# Patient Record
Sex: FEMALE | Race: WHITE | Employment: UNEMPLOYED | URBAN - METROPOLITAN AREA
[De-identification: names, ages, dates, MRNs, and addresses within clinical notes are randomized per-mention and may not be internally consistent; named-entity substitution may affect disease eponyms.]

---

## 2017-01-03 ENCOUNTER — TELEPHONE (OUTPATIENT)
Dept: PAIN MANAGEMENT | Age: 57
End: 2017-01-03

## 2017-01-04 DIAGNOSIS — M96.1 POSTLAMINECTOMY SYNDROME, LUMBAR REGION: Primary | ICD-10-CM

## 2017-01-04 DIAGNOSIS — M54.17 LUMBOSACRAL RADICULOPATHY: ICD-10-CM

## 2017-01-09 ENCOUNTER — TELEPHONE (OUTPATIENT)
Dept: PAIN MANAGEMENT | Age: 57
End: 2017-01-09

## 2017-01-09 NOTE — TELEPHONE ENCOUNTER
Pt called Friday asking for status on MRI of lumbar spine. Have LM with Henry Lockwood to check status as it will be scheduled with Nica Daniels. Have called pt to relay and advised that with the weather, it will be a few more days for call to schedule. She expressed understanding.

## 2017-01-24 ENCOUNTER — HOSPITAL ENCOUNTER (OUTPATIENT)
Dept: MRI IMAGING | Age: 57
Discharge: HOME | End: 2017-01-24
Attending: PSYCHIATRY & NEUROLOGY
Payer: MEDICAID

## 2017-01-24 DIAGNOSIS — M96.1 POSTLAMINECTOMY SYNDROME, LUMBAR REGION: ICD-10-CM

## 2017-01-24 DIAGNOSIS — M54.17 LUMBOSACRAL RADICULOPATHY: ICD-10-CM

## 2017-01-24 PROCEDURE — A9585 GADOBUTROL INJECTION: HCPCS | Performed by: PSYCHIATRY & NEUROLOGY

## 2017-01-24 PROCEDURE — 72158 MRI LUMBAR SPINE W/O & W/DYE: CPT

## 2017-01-24 PROCEDURE — 74011250636 HC RX REV CODE- 250/636: Performed by: PSYCHIATRY & NEUROLOGY

## 2017-01-24 RX ADMIN — GADOBUTROL 5 ML: 604.72 INJECTION INTRAVENOUS at 16:55

## 2017-01-25 ENCOUNTER — OFFICE VISIT (OUTPATIENT)
Dept: PAIN MANAGEMENT | Age: 57
End: 2017-01-25

## 2017-01-25 VITALS
HEART RATE: 83 BPM | DIASTOLIC BLOOD PRESSURE: 60 MMHG | BODY MASS INDEX: 23.78 KG/M2 | SYSTOLIC BLOOD PRESSURE: 114 MMHG | WEIGHT: 130 LBS

## 2017-01-25 DIAGNOSIS — G89.4 CHRONIC PAIN SYNDROME: ICD-10-CM

## 2017-01-25 DIAGNOSIS — M96.1 POSTLAMINECTOMY SYNDROME, LUMBAR REGION: ICD-10-CM

## 2017-01-25 DIAGNOSIS — M79.7 FIBROMYALGIA: ICD-10-CM

## 2017-01-25 DIAGNOSIS — M62.838 SPASM OF MUSCLE: ICD-10-CM

## 2017-01-25 DIAGNOSIS — M54.2 CERVICALGIA: ICD-10-CM

## 2017-01-25 DIAGNOSIS — Z79.899 ENCOUNTER FOR LONG-TERM (CURRENT) USE OF HIGH-RISK MEDICATION: ICD-10-CM

## 2017-01-25 DIAGNOSIS — M47.817 LUMBOSACRAL SPONDYLOSIS WITHOUT MYELOPATHY: ICD-10-CM

## 2017-01-25 DIAGNOSIS — M54.12 BRACHIAL NEURITIS OR RADICULITIS: ICD-10-CM

## 2017-01-25 DIAGNOSIS — I01.1 RHEUMATOID AORTITIS: Primary | ICD-10-CM

## 2017-01-25 DIAGNOSIS — M54.6 PAIN IN THORACIC SPINE: ICD-10-CM

## 2017-01-25 DIAGNOSIS — G47.00 PERSISTENT DISORDER OF INITIATING OR MAINTAINING SLEEP: ICD-10-CM

## 2017-01-25 DIAGNOSIS — M25.50 PAIN IN JOINT, MULTIPLE SITES: ICD-10-CM

## 2017-01-25 RX ORDER — OXYCODONE HYDROCHLORIDE 30 MG/1
30 TABLET ORAL
Qty: 150 TAB | Refills: 0 | Status: SHIPPED | OUTPATIENT
Start: 2017-03-24 | End: 2017-07-03 | Stop reason: SDUPTHER

## 2017-01-25 RX ORDER — CARISOPRODOL 350 MG/1
350 TABLET ORAL 4 TIMES DAILY
COMMUNITY
End: 2019-07-29

## 2017-01-25 RX ORDER — OXYCODONE HYDROCHLORIDE 30 MG/1
30 TABLET ORAL
Qty: 150 TAB | Refills: 0 | Status: SHIPPED | OUTPATIENT
Start: 2017-02-23 | End: 2017-04-19 | Stop reason: SDUPTHER

## 2017-01-25 NOTE — MR AVS SNAPSHOT
Visit Information Date & Time Provider Department Dept. Phone Encounter #  
 1/25/2017  3:00 PM Sarina Acharya MD [facility] [phone] [id number] Follow-up Instructions Return in about 3 months (around 4/25/2017). Your Appointments 2/8/2017  3:20 PM  
Follow Up with Sarina Acharya MD  
[facility] [address] [facility]) Appt Note: 2  
 [facility] [place] [id number]  
[phone]  [id number] Upcoming Health Maintenance Date Due Hepatitis C Screening 1960 Pneumococcal 19-64 Medium Risk (1 of 1 - PPSV23) 7/11/1978 DTaP/Tdap/Td series (1 - Tdap) 7/11/1980 PAP AKA CERVICAL CYTOLOGY 7/11/1980 BREAST CANCER SCRN MAMMOGRAM 7/11/2009 FOBT Q 1 YEAR AGE 50-75 7/11/2009 INFLUENZA AGE 9 TO ADULT 8/1/2016 Allergies as of 1/25/2017  Review Complete On: 1/25/2017 By: Simon Carrera RN Severity Noted Reaction Type Reactions Aspirin    Other (comments)  
 tinnitus Codeine    Other (comments) GI  
  
Current Immunizations  Never Reviewed No immunizations on file. Not reviewed this visit You Were Diagnosed With   
  
 Codes Comments Chronic pain syndrome     ICD-10-CM: G89.4 ICD-9-CM: 338. 4 Vitals BP Pulse Weight(growth percentile) BMI OB Status Smoking Status 114/60 83 130 lb (59 kg) 23.78 kg/m2 Hysterectomy Current Some Day Smoker BMI and BSA Data Body Mass Index Body Surface Area 23.78 kg/m 2 1.61 m 2 Preferred Pharmacy Pharmacy Name Phone [address] [place] [address] Court Your Updated Medication List  
  
   
This list is accurate as of: 1/25/17  4:29 PM.  Always use your most recent med list.  
  
  
  
  
 benzonatate 200 mg capsule Commonly known as:  TESSALON  
 Take 200 mg by mouth three (3) times daily as needed for Cough. CRANBERRY PO Take 3,600 mg by mouth daily. dexamethasone 4 mg tablet Commonly known as:  DECADRON Taper daily as follows: 5-4-3-2-1  
  
 dicyclomine 20 mg tablet Commonly known as:  BENTYL Take 20 mg by mouth every six (6) hours. DOCUSOFT S 100 mg capsule Generic drug:  docusate sodium Take 100 mg by mouth two (2) times a day.  
  
 gabapentin 600 mg tablet Commonly known as:  NEURONTIN  
1 po bid x 14 days, then 1 po tid  Indications: NEUROPATHIC PAIN  
  
 haloperidol 0.5 mg tablet Commonly known as:  HALDOL Take 0.5 mg by mouth. hydroCHLOROthiazide 12.5 mg tablet Commonly known as:  HYDRODIURIL Take 1 Tab by mouth daily. KlonoPIN 1 mg tablet Generic drug:  clonazePAM  
Take 1 mg by mouth three (3) times daily. linaclotide 145 mcg Cap capsule Commonly known as:  Davina Genta Take  by mouth Daily (before breakfast). mirtazapine 45 mg tablet Commonly known as:  Ardath Elizabeth Take 45 mg by mouth nightly. OTHER Take 1 Tab by mouth daily. I cool tabs * oxyCODONE IR 30 mg immediate release tablet Commonly known as:  OXY-IR Take 1 Tab by mouth five (5) times daily for 30 days. for chronic, severe, refractory pain  Indications: NEUROPATHIC PAIN, PAIN Start taking on:  2/23/2017 * oxyCODONE IR 30 mg immediate release tablet Commonly known as:  OXY-IR Take 1 Tab by mouth five (5) times daily for 30 days. for chronic, severe, refractory pain  . Indications: NEUROPATHIC PAIN, PAIN Start taking on:  3/24/2017 SOMA 350 mg tablet Generic drug:  carisoprodol Take 350 mg by mouth four (4) times daily. STRESSTABS PO Take  by mouth daily. TOPAMAX 50 mg tablet Generic drug:  topiramate Take  by mouth two (2) times a day. TOPROL XL 50 mg XL tablet Generic drug:  metoprolol succinate Take  by mouth daily. ZOLOFT 25 mg tablet Generic drug:  sertraline Take 100 mg by mouth two (2) times a day. * Notice: This list has 2 medication(s) that are the same as other medications prescribed for you. Read the directions carefully, and ask your doctor or other care provider to review them with you. Prescriptions Printed Refills  
 oxyCODONE IR (OXY-IR) 30 mg immediate release tablet 0 Starting on: 3/24/2017 Sig: Take 1 Tab by mouth five (5) times daily for 30 days. for chronic, severe, refractory pain Brigitte Po Indications: NEUROPATHIC PAIN, PAIN Class: Print Route: Oral  
 oxyCODONE IR (OXY-IR) 30 mg immediate release tablet 0 Starting on: 2/23/2017 Sig: Take 1 Tab by mouth five (5) times daily for 30 days. for chronic, severe, refractory pain  Indications: NEUROPATHIC PAIN, PAIN Class: Print Route: Oral  
  
Follow-up Instructions Return in about 3 months (around 4/25/2017). Introducing [facility] HE[facility]! Dear Jean-Pierre Loya: 
Thank you for requesting a [employer] account. Our records indicate that you already have an active [employer] account. You can access your account anytime at [email or web address] Did you know that you can access your hospital and ER discharge instructions at any time in [employer]? You can also review all of your test results from your hospital stay or ER visit. Additional Information If you have questions, please visit the Frequently Asked Questions section of the [employer] website at [email or web address] Remember, [employer] is NOT to be used for urgent needs. For medical emergencies, dial 911. Now available from your iPhone and Android! Please provide this summary of care documentation to your next provider. Your primary care clinician is listed as Allyne Oppenheim. If you have any questions after today's visit, please call [phone].

## 2017-01-25 NOTE — PROGRESS NOTES
HISTORY OF PRESENT ILLNESS  Kathie Roberto is a 61 y.o. female. HPI she returns for follow-up of chronic, severe pain involving the lumbar spine with radiation down the posterior left greater than right lower extremities to the mid calf associated with numbness and tingling. She suffers from possible rheumatoid arthritis as well as a post lumbar laminectomy pain syndrome and cervical and lumbar degenerative disc disease. Since last seen, she underwent an MRI of the lumbar spine which was reviewed with results as follows:    Findings:     Images compromised by poor signal-to-noise and motion.     MRI images suggest broad and pedicle screw fixation L3 through to S1 with  associated decompression. Disc spacer at L3-L4. Severe loss of disc height at  L5-S1. The overall appearance is stable.     Sagittal images reveal otherwise normal vertebral body morphology. No fractures  noted. No osseous lesions or abnormal signal intensity present. Alignments are anatomic, no evidence for subluxation.      Conus medullaris ends at the L1-L2 vertebral body level.      No abnormal vertebral body, epidural, or intradural enhancement.        Correlation of axial and sagittal images reveals the following:     At L1-L2: No significant disc pathology or proliferative changes. No central  canal or foraminal stenosis.      At L2-L3: No significant disc pathology or proliferative changes. No central  canal or foraminal stenosis.     At L3-L4: First level of fusion with disc spacer. Central canal is widely  patent. Only mild foraminal stenosis.     At L4-L5: No significant disc pathology or proliferative changes. No central  canal or foraminal stenosis.     At L5-S1: Severe loss of disc height. Central canal is widely patent. Moderate  bilateral foraminal stenosis.     Visualized portions of the sacroiliac joints are unremarkable.  Incidentally  imaged retroperitoneal structures are unremarkable as well.     The significance of the MRI findings was discussed with the patient. Interventional procedures were reviewed and it was elected to try a transforaminal epidural steroid injection at L5-S1 bilaterally. Based upon the results of the epidural injection consideration could be given towards surgical foraminotomy. Pain level today 8 out of 10, outcome 15/28,(The lower the upper number, the better the outcome)  Physical activity and mobility as well as sleep are fair, mood is poor. No reported side effects. Review of Systems   Constitutional: Negative. Gastrointestinal: Positive for constipation (at times). Has started taking Fiber Choice and eating dried prunes   Musculoskeletal: Positive for back pain, joint pain, myalgias and neck pain. Neurological: Positive for tingling. Psychiatric/Behavioral: Positive for depression. Negative for suicidal ideas. The patient is nervous/anxious and has insomnia. All other systems reviewed and are negative. Physical Exam   Constitutional: She is oriented to person, place, and time. She appears well-developed and well-nourished. She appears distressed. thin   HENT:   Head: Normocephalic. Eyes: Conjunctivae and EOM are normal. Pupils are equal, round, and reactive to light.   glasses   Neck: Normal range of motion. No thyromegaly present. Painful ROM   Pulmonary/Chest: Effort normal. No respiratory distress. Musculoskeletal: She exhibits tenderness (neck/lumbar). She exhibits no edema. Right knee: She exhibits decreased range of motion. Tenderness found. Left knee: Tenderness found. Cervical back: She exhibits decreased range of motion, tenderness, pain and spasm. Thoracic back: She exhibits tenderness, pain and spasm. Lumbar back: She exhibits decreased range of motion, tenderness, pain and spasm. Back:    Neurological: She is alert and oriented to person, place, and time. No cranial nerve deficit (grossly intact).  Gait (antalgic) abnormal.   Psychiatric: She has a normal mood and affect. Her behavior is normal. Judgment and thought content normal.   Nursing note and vitals reviewed. ASSESSMENT and PLAN  Encounter Diagnoses   Name Primary?  Rheumatoid aortitis (HCC) Yes    Chronic pain syndrome     Brachial neuritis or radiculitis     Persistent disorder of initiating or maintaining sleep     Lumbosacral spondylosis without myelopathy     Pain in thoracic spine     Encounter for long-term (current) use of high-risk medication     Postlaminectomy syndrome, lumbar region     Cervicalgia     Fibromyalgia     Spasm of muscle     Pain in joint, multiple sites        She will continue on her current analgesic regimen as this is providing excellent pain control with improve functionality and minimal side effects. Further recommendations will be based upon her response to the epidural injection. No concerns are raised for misuse, abuse, or diversion. 1. Pain medications are prescribed with the objective of pain relief and improved physical and psychosocial function in this patient. 2. Counseled patient on proper use of prescribed medications and reviewed opioid contract. 3. Counseled patient about chronic medical conditions and their relationship to anxiety and depression and recommended mental health support as needed. 4. Reviewed with patient self-help tools, home exercise, and lifestyle changes to assist the patient in self-management of symptoms. 5. Advised patient to have a primary care provider to continue care for health maintenance and general medical conditions and support for referral to specialty care as needed. 6. Reviewed with patient the treatment plan, goals of treatment plan, and limitations of treatment plan, to include the potential for side effects from medications and procedures.  If side effects occur, it is the responsibility of the patient to inform the clinic so that a change in the treatment plan can be made in a safe manner. The patient is advised that stopping prescribed medication may cause an increase in symptoms and possible medication withdrawal symptoms. The patient is informed an emergency room evaluation may be necessary if this occurs. DISPOSITION: The patients condition and plan were discussed at length and all questions were answered. The patient agrees with the plan.     Counseling occupied > 50% of visit:  Total time: 40 minutes

## 2017-01-25 NOTE — PROGRESS NOTES
Nursing Notes    Patient presents to the office today in follow-up. Patient rates her pain at 8/10 on the numerical pain scale. Reviewed medications with counts as follows:    Rx Date filled Qty Dispensed Pill Count Last Dose Short   domonique  30 12/23/16 150 5 1/25/17 no         Comments:     POC UDS was not performed in office today    Any new labs or imaging since last appointment? YES,MRI we ordered    Have you been to an emergency room (ER) or urgent care clinic since your last visit? NO            Have you been hospitalized since your last visit? NO     If yes, where, when, and reason for visit? Have you seen or consulted any other health care providers outside of the [address] [place] [address]  since your last visit? NO     If yes, where, when, and reason for visit? Ms. Guzman Hence has a reminder for a \"due or due soon\" health maintenance. I have asked that she contact her primary care provider for follow-up on this health maintenance.

## 2017-01-30 RX ORDER — MIDAZOLAM HYDROCHLORIDE 1 MG/ML
.5-6 INJECTION, SOLUTION INTRAMUSCULAR; INTRAVENOUS
Status: CANCELLED | OUTPATIENT
Start: 2017-01-31

## 2017-01-30 RX ORDER — SODIUM CHLORIDE 0.9 % (FLUSH) 0.9 %
5-10 SYRINGE (ML) INJECTION AS NEEDED
Status: CANCELLED | OUTPATIENT
Start: 2017-01-31

## 2017-01-31 ENCOUNTER — HOSPITAL ENCOUNTER (OUTPATIENT)
Age: 57
Setting detail: OUTPATIENT SURGERY
Discharge: HOME | End: 2017-01-31
Attending: PHYSICAL MEDICINE & REHABILITATION | Admitting: PHYSICAL MEDICINE & REHABILITATION
Payer: MEDICAID

## 2017-01-31 ENCOUNTER — APPOINTMENT (OUTPATIENT)
Dept: GENERAL RADIOLOGY | Age: 57
End: 2017-01-31
Attending: PHYSICAL MEDICINE & REHABILITATION
Payer: MEDICAID

## 2017-01-31 VITALS
TEMPERATURE: 98.6 F | HEART RATE: 76 BPM | BODY MASS INDEX: 23.92 KG/M2 | HEIGHT: 62 IN | WEIGHT: 130 LBS | DIASTOLIC BLOOD PRESSURE: 70 MMHG | SYSTOLIC BLOOD PRESSURE: 115 MMHG | OXYGEN SATURATION: 96 % | RESPIRATION RATE: 15 BRPM

## 2017-01-31 PROCEDURE — 77030003666 HC NDL SPINAL BD -A: Performed by: PHYSICAL MEDICINE & REHABILITATION

## 2017-01-31 PROCEDURE — 99144 HC MOD CS >5 YRS 1ST 30 MINS: CPT | Performed by: PHYSICAL MEDICINE & REHABILITATION

## 2017-01-31 PROCEDURE — 74011250636 HC RX REV CODE- 250/636

## 2017-01-31 PROCEDURE — 99152 MOD SED SAME PHYS/QHP 5/>YRS: CPT | Performed by: PHYSICAL MEDICINE & REHABILITATION

## 2017-01-31 PROCEDURE — 76010000009 HC PAIN MGT 0 TO 30 MIN PROC: Performed by: PHYSICAL MEDICINE & REHABILITATION

## 2017-01-31 RX ORDER — SODIUM CHLORIDE 0.9 % (FLUSH) 0.9 %
5-10 SYRINGE (ML) INJECTION AS NEEDED
Status: DISCONTINUED | OUTPATIENT
Start: 2017-01-31 | End: 2017-01-31 | Stop reason: HOSPADM

## 2017-01-31 RX ORDER — LIDOCAINE HYDROCHLORIDE 10 MG/ML
INJECTION, SOLUTION EPIDURAL; INFILTRATION; INTRACAUDAL; PERINEURAL AS NEEDED
Status: DISCONTINUED | OUTPATIENT
Start: 2017-01-31 | End: 2017-01-31 | Stop reason: HOSPADM

## 2017-01-31 RX ORDER — FENTANYL CITRATE 50 UG/ML
INJECTION, SOLUTION INTRAMUSCULAR; INTRAVENOUS AS NEEDED
Status: DISCONTINUED | OUTPATIENT
Start: 2017-01-31 | End: 2017-01-31 | Stop reason: HOSPADM

## 2017-01-31 RX ORDER — BETAMETHASONE SODIUM PHOSPHATE AND BETAMETHASONE ACETATE 3; 3 MG/ML; MG/ML
INJECTION, SUSPENSION INTRA-ARTICULAR; INTRALESIONAL; INTRAMUSCULAR; SOFT TISSUE AS NEEDED
Status: DISCONTINUED | OUTPATIENT
Start: 2017-01-31 | End: 2017-01-31 | Stop reason: HOSPADM

## 2017-01-31 RX ORDER — MIDAZOLAM HYDROCHLORIDE 1 MG/ML
.5-6 INJECTION, SOLUTION INTRAMUSCULAR; INTRAVENOUS
Status: DISCONTINUED | OUTPATIENT
Start: 2017-01-31 | End: 2017-01-31 | Stop reason: HOSPADM

## 2017-01-31 NOTE — DISCHARGE INSTRUCTIONS
[facility]      Post Procedures Instructions    *Resume Diet and Activity as tolerated. Rest for the remainder of the day. *You may fell worse before you feel better as the numbing medications wear off before the steroids take effect if used for your procedures. *Do not use affected extremity until numbness or loss of sensation has completely resolved without assistance. *DO NOT DRIVE, operate machinery/heavey equipment for 24 hours. *DO NOT DRINK ALCOHOL for 24 hours as it may interact with the sedation if you received it and also thins your blood and may cause you to bleed. *WAIT 24 hours before starting back ANY Blood thinning medications:   (Heparin, Coumadin, Warfarin, Lovenox, Plavix, Aggrenox)    *Resume Pre-Procedure Medications as prescribed except Blood Thinners unless directed by your Physician or Cardiologist.     *Avoid Hot tubs and Heating pad for 24 hours to prevent dissipation of medications, you may shower to remove bandages and remaining prep residue on the skin. * If you develop a Headache, drink plenty of fluids including beverages with caffeine (Coffee, Mt. Dew etc.) and rest.  If the headache persists longer than 24 hoursor intensifies - Please call [facility] ([facility]) [phone]      * If you are DIABETIC, check your blood sugar three times a day for the next three days, the steroids will increase your blood sugar. If your blood sugar is greater than 400 have someone drive you to the nearest [address]. * If you experience any of the following problems, call the [facility] [phone] between 8:00 am - 4:30pm or After Hours [phone].     Shortness of breath    Fever of 101 F or higher    Nausea / Vomiting (not normal to you)    Increasing stiffness in the neck    Weakness or numbness in the arms or legs that is not resolving    Prolonged and increasing pain > than 4 days    ANYTHING OUT of the ORDINARY TO YOU    If YOU are experiencing a severe reaction / complication that you have never had before post procedure, call 911 or go to the nearest emergency room! All patients must have a  for transportation [place] regardless if you do or do not receive sedation. DISCHARGE SUMMARY from Nurse      PATIENT INSTRUCTIONS:    After Oral  or intravenous sedation, for 24 hours or while taking prescription Narcotics:  · Limit your activities  · Do not drive and operate hazardous machinery  · Do not make important personal or business decisions  · Do  not drink alcoholic beverages  · If you have not urinated within 8 hours after discharge, please contact your surgeon on call. Report the following to your surgeon:  · Excessive pain, swelling, redness or odor of or around the surgical area  · Temperature over 101  · Nausea and vomiting lasting longer than 4 hours or if unable to take medications  · Any signs of decreased circulation or nerve impairment to extremity: change in color, persistent  numbness, tingling, coldness or increase pain  · Any questions        What to do at Home:  Recommended activity: Activity as tolerated, NO DRIVING FOR 24 Hours post injection          *  Please give a list of your current medications to your Primary Care Provider. *  Please update this list whenever your medications are discontinued, doses are      changed, or new medications (including over-the-counter products) are added. *  Please carry medication information at all times in case of emergency situations. These are general instructions for a healthy lifestyle:    No smoking/ No tobacco products/ Avoid exposure to second hand smoke    Surgeon General's Warning:  Quitting smoking now greatly reduces serious risk to your health.     Obesity, smoking, and sedentary lifestyle greatly increases your risk for illness    A healthy diet, regular physical exercise & weight monitoring are important for maintaining a healthy lifestyle    You may be retaining fluid if you have a history of heart failure or if you experience any of the following symptoms:  Weight gain of 3 pounds or more overnight or 5 pounds in a week, increased swelling in our hands or feet or shortness of breath while lying flat in bed. Please call your doctor as soon as you notice any of these symptoms; do not wait until your next office visit. Recognize signs and symptoms of STROKE:    F-face looks uneven    A-arms unable to move or move unevenly    S-speech slurred or non-existent    T-time-call 911 as soon as signs and symptoms begin-DO NOT go       Back to bed or wait to see if you get better-TIME IS BRAIN.

## 2017-01-31 NOTE — PROCEDURES
[facility]    LUMBAR TRANSFORAMINAL EPIDURAL STEROID INJECTION  PROCEDURE REPORT      PATIENT:  Kathie Helton  YOB: 1960  DATE OF SERVICE:  1/31/2017  SITE:  [facility]    PRE-PROCEDURE DIAGNOSIS:  See Above    POST-PROCEDURE DIAGNOSIS:  See Above                PROCEDURE:    1. Bilateral lumbar transforaminal epidural steroid injection, L5-S1, (32358, 50,   )  2. Fluoroscopic needle guidance (spinal) (51190)  3. Supervision of moderate sedation (28013)    ANESTHESIA:  Local with moderate IV sedation. See Medication Administration Record for specific medications and dosage. COMPLICATIONS: None. PHYSICIAN:  Marce Liu MD    PRE-PROCEDURE NOTE:  Pre-procedural assessment of the patient was performed including a limited history and physical examination. The details of the procedure were discussed with the patient, including the risks, benefits and alternative options and an informed consent was obtained. The patients NPO status, if necessary for the specific procedure and/or administration of moderate intravenous sedation, if utilized, and availability of a responsible adult to escort the patient following the procedure were confirmed. A peripheral intravenous cannula was placed without difficulty and lactated Ringers solution administered. See nursing notes for details. PROCEDURE NOTE:  The patient was brought to the procedure suite and positioned on the fluoroscopy table in the prone position. Physiologic monitors were applied and supplemental oxygen was administered via nasal cannula. The skin was prepped in the standard surgical fashion and sterile drapes were applied over the procedure site.  Please refer to the Flowsheet for documentation of the patients vital signs and the Medication Administration Record for any oral and/or intravenous sedation administered prior to or during the procedure. 1% Lidocaine was utilized for local anesthesia. Under direct, ipsilateral, oblique fluoroscopic guidance, a 22 gauge, 5 inch short bevel spinal needle was advanced to the superior aspect of each above mentioned neural foramen from the posterolateral approach. Correct needle placement was confirmed in AP, lateral and oblique fluoroscopic views. In AP fluoroscopic view, after negative aspiration of blood or CSF, 0.5 mL of nonionic, water-soluble, radiographic contrast medium (Isovue-M 200) was injected at each level revealing an epineurogram and partial epidurogram without demonstration of vascular uptake. Finally, after negative aspiration of blood or CSF, a 2 mL aliquot of a solution comprised of 1cc lidocaine 1% and Celestone 6mg/cc 1ml  was injected into each neural foramen. After restiletting, each needle was removed intact. The area was thoroughly cleaned and sterile bandages applied as necessary. The patient tolerated the procedure well without complication and the vital signs remained stable throughout the procedure. POST-PROCEDURE COURSE:   The patient was escorted from the procedure suite in satisfactory condition and recovered per facility protocol based on the type of procedure performed and/or the sedation utilized. The patient did not experience any adverse events and remained hemodynamically stable during the post-procedure period. DISCHARGE NOTE:  Upon discharge, the patient was able to tolerate fluids and was in no acute distress. The patient was oriented to person, place and time and vital signs were stable. Appropriate post-procedure instructions were provided and explained to the patient in detail and all questions were answered.         Josiah Claire MD 1/31/2017 12:45 PM

## 2017-01-31 NOTE — INTERVAL H&P NOTE
H&P Update:  Kathie Helton was seen and examined. History and physical has been reviewed. The patient has been examined.  There have been no significant clinical changes since the completion of the originally dated History and Physical.    Signed By: Elenita Feliz MD     January 31, 2017 10:11 AM

## 2017-01-31 NOTE — H&P (VIEW-ONLY)
Nursing Notes    Patient presents to the office today in follow-up. Patient rates her pain at 8/10 on the numerical pain scale. Reviewed medications with counts as follows:    Rx Date filled Qty Dispensed Pill Count Last Dose Short   domonique  30 12/23/16 150 5 1/25/17 no         Comments:     POC UDS was not performed in office today    Any new labs or imaging since last appointment? YES,MRI we ordered    Have you been to an emergency room (ER) or urgent care clinic since your last visit? NO            Have you been hospitalized since your last visit? NO     If yes, where, when, and reason for visit? Have you seen or consulted any other health care providers outside of the [address] [place] [address]  since your last visit? NO     If yes, where, when, and reason for visit? Ms. Bruno Coreas has a reminder for a \"due or due soon\" health maintenance. I have asked that she contact her primary care provider for follow-up on this health maintenance.

## 2017-02-08 ENCOUNTER — OFFICE VISIT (OUTPATIENT)
Dept: PAIN MANAGEMENT | Age: 57
End: 2017-02-08

## 2017-02-08 VITALS
WEIGHT: 130 LBS | SYSTOLIC BLOOD PRESSURE: 117 MMHG | DIASTOLIC BLOOD PRESSURE: 70 MMHG | HEART RATE: 81 BPM | BODY MASS INDEX: 23.78 KG/M2

## 2017-02-08 DIAGNOSIS — G89.4 CHRONIC PAIN SYNDROME: Primary | ICD-10-CM

## 2017-02-08 DIAGNOSIS — M96.1 POSTLAMINECTOMY SYNDROME, LUMBAR REGION: ICD-10-CM

## 2017-02-08 DIAGNOSIS — M47.817 LUMBOSACRAL SPONDYLOSIS WITHOUT MYELOPATHY: ICD-10-CM

## 2017-02-08 DIAGNOSIS — I01.1 RHEUMATOID AORTITIS: ICD-10-CM

## 2017-02-09 NOTE — PROGRESS NOTES
[address]  Interventional Pain Management Consultation History & Physical    PATIENT NAME:  Kathie Helton     YOB: 1960    DATE OF SERVICE:   2/8/2017      CHIEF COMPLAINT:  No chief complaint on file. HISTORY OF PRESENT ILLNESS:   Kathie Helton presents to the pain clinic today for follow on evaluation and to consider interventional pain management options as indicated for the type and location of the pain the patient is presenting with. Kathie Helton patient presents for follow-up evaluation after her bilateral lumbar epidural steroid injections, transforaminal bilateral L5-S1 injections were done January 31, 2016. She states she obtained and no benefit from these procedures. She wishes to be evaluated for other procedures as indicated. She currently endorses ongoing severe low back pain      Which she endorses as aching throbbing and burning pain. Pain occasionally radiates down both legs. Pain is increased with standing and sitting as well as walking. She has had this pain for very long time. She further has neuropathic pain of her feet. She has had 2 previous back surgeries. We discussed options. I will be glad to discuss other procedures that may help the patient. However I have actually never even seen the patient in clinic before. I discussed with the patient that I will need to see her as an initial clinic evaluation in order to best determine possible interventional course of action. I need to review patient's records, performed physical exam, review any previous imaging and complete my data gathering. I will then be able to help the patient with any interventional pain procedures I may be able to offer her. She understands and will proceed with an initial clinic evaluation in the near future.       MRI: Reviewed imaging briefly    PROCEDURES: To be determined at next visit    MRI Results (most recent):    Results from Hospital Encounter encounter on 01/24/17   MRI LUMB SPINE W WO CONT   Narrative Procedure: MRI of the lumbar spine with and without contrast.    CPT code: 28569    Indications: Back pain radiating down both legs. 2 prior back surgeries, most  recent in 2009    Comparisons: None. Technique: T1 weighted, T2 FSE with fat saturation, FSE inversion recovery, T1  weighted post contrast with fat saturation sagittal images are supplemented by  T2 weighted with fat saturation and T1 weighted pre- and post contrast axial  images. 5 cc gadavist administered via the intravenous route. Findings:    Images compromised by poor signal-to-noise and motion. MRI images suggest broad and pedicle screw fixation L3 through to S1 with  associated decompression. Disc spacer at L3-L4. Severe loss of disc height at  L5-S1. The overall appearance is stable. Sagittal images reveal otherwise normal vertebral body morphology. No fractures  noted. No osseous lesions or abnormal signal intensity present. Alignments are anatomic, no evidence for subluxation. Conus medullaris ends at the L1-L2 vertebral body level. No abnormal vertebral body, epidural, or intradural enhancement. Correlation of axial and sagittal images reveals the following:    At L1-L2: No significant disc pathology or proliferative changes. No central  canal or foraminal stenosis. At L2-L3: No significant disc pathology or proliferative changes. No central  canal or foraminal stenosis. At L3-L4: First level of fusion with disc spacer. Central canal is widely  patent. Only mild foraminal stenosis. At L4-L5: No significant disc pathology or proliferative changes. No central  canal or foraminal stenosis. At L5-S1: Severe loss of disc height. Central canal is widely patent. Moderate  bilateral foraminal stenosis. Visualized portions of the sacroiliac joints are unremarkable.   Incidentally  imaged retroperitoneal structures are unremarkable as well. Impression Impression:    Postoperative changes as above. No critical stenosis noted. PAST MEDICAL HISTORY:   The patient  has a past medical history of Arrhythmia; Depression; Headache(784.0); and UTI (urinary tract infection). PAST SURGICAL HISTORY:   The patient  has a past surgical history that includes appendectomy; tonsillectomy; orthopaedic (2007); and gyn (1989). CURRENT MEDICATIONS:   The patient has a current medication list which includes the following prescription(s): carisoprodol, oxycodone ir, oxycodone ir, dexamethasone, hydrochlorothiazide, linaclotide, benzonatate, topiramate, haloperidol, dicyclomine, mirtazapine, docusate sodium, sertraline, gabapentin, cranberry fruit extract, clonazepam, OTHER, metoprolol succinate, and multivitamin,stress formula. ALLERGIES:     Allergies   Allergen Reactions    Dilaudid [Hydromorphone (Bulk)] Palpitations     tachycardia    Augmentin [Amoxicillin-Pot Clavulanate] Nausea and Vomiting    Aspirin Other (comments)     tinnitus    Ceclor [Cefaclor] Other (comments)     Patient not sure of reaction    Codeine Other (comments)     GI       FAMILY HISTORY:   The patient family history includes Cancer in an other family member; Coronary Artery Disease in an other family member; Diabetes in her father and mother; Hypertension in her father and mother. SOCIAL HISTORY:   The patient  reports that she has been smoking Cigarettes. She has been smoking about 0.50 packs per day. She has never used smokeless tobacco. The patient  reports that she does not drink alcohol. She also  reports that she does not use illicit drugs.     REVIEW OF SYSTEMS:   The patient denies fever, chills, weight loss (Constitutional), rash, itching (Skin), tinnitus, congestion (HENT), blurred vision, photophobia (Eyes), palpitations, orthopnea (Cardiovascular), hemoptysis, wheezing (Respiratory), nausea, vomiting, diarrhea (Gastrointestinal), dysuria, hematuria, urgency (Genitourinary), easy bruising, bleeding abnormalities (Hematologic), bowel or bladder incontinence, loss of consciousness (Neurologic), suicidal or homicidal ideation or hallucinations (Psychiatric). PHYSICAL EXAM:  VS: There were no vitals taken for this visit. General: Well-developed and well-nourished. Body habitus consistent with recorded height and weight and the calculated BMI. Apparent distress due to ongoing low back pain. Head: Normocephalic, atraumatic. Skin: Inspection of the skin reveals no rashes, lesions or infection. CV: Regular rate. No murmurs or rubs noted. No peripheral edema noted. Pulm: Respirations are even and unlabored. Extr: No clubbing, cyanosis, or edema noted. Musculoskeletal:  1. Cervical spine decreased ROM. No paraspinous tenderness at any level. There is no scoliosis, asymmetry, or musculoskeletal defect. 2. Thoracic spine  Full ROM. No paraspinous tenderness at any level. There is no scoliosis, asymmetry, or musculoskeletal defect. 3. Lumbar spine markedly decreased range of motion all axes . Paraspinous tenderness at bilateral lumbar levels . SI joints are nontender bilaterally. There is no scoliosis, asymmetry, or musculoskeletal defect. 4. Right upper extremity  Full ROM. 5/5 muscle strength in all muscle groups. No pain or tenderness in shoulder, elbow, wrist, or hand. 5. Left upper extremity  Full ROM. 5/5 muscle strength in all muscle groups. No pain or tenderness in shoulder, elbow, wrist, or hand. 6. Right lower extremity  Full ROM. 5/5 muscle strength in all muscle groups. No pain, tenderness, or swelling in the hip, knee, ankle or foot. 7. Left lower extremity  Full ROM. 5/5 muscle strength in all muscle groups. No pain, tenderness, or swelling in the hip, knee, ankle or foot. Neurological:  1. Mental Status - Alert, awake and oriented. Speech is clear and appropriate.   2. Cranial Nerves - Extraocular muscles intact bilaterally. Cranial nerves II-XII grossly intact bilaterally. 3. Gait - antalgic   4. Reflexes - 2+ and symmetric throughout. 5. Sensation - Intact to light touch and pin prick. 6. Provocative Tests - Spurlings negative bilaterally. Straight leg raise negative bilaterally. Psychological:  1. Mood and affect  Appropriate. 2. Speech  Appropriate. 3. Though content  Appropriate. 4. Judgment  Appropriate. ASSESSMENT:      ICD-10-CM ICD-9-CM    1. Chronic pain syndrome G89.4 338.4    2. Postlaminectomy syndrome, lumbar region M96.1 722.83    3. Lumbosacral spondylosis without myelopathy M47.817 721.3    4. 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 Diagnoses/Plan: Chronic pain syndrome, lumbar postlaminectomy syndrome, lumbosacral spondylosis. 2.    I have thoroughly discussed the risks and benefits, side effects and complications, of any and all procedures that were mentioned at today's patient visit. I have used a skeleton model for added emphasis and patient education. I have answered all questions, and I have obtained verbal confirmation for all procedures planned with the patient. 3.    I have reviewed in great detail today the patient's MRI and other imaging studies with the patient. I have explained to the patient their condition using both actual recent and relevant images. I have used a skeleton model for added emphasis as well as patient education. 4.    I have advised patient to have a primary care provider to continue care for health maintenance and general medical conditions and support for referral to specialty care as needed. 5.    I have reviewed with patient the treatment plan, goals of treatment plan, and limitations of treatment plan, to include the potential for side effects from medications and procedures.   If side effects occur, it is the responsibility of the patient to inform the clinic so that a change in the treatment plan can be made in a safe manner. The patient is advised that stopping prescribed medication may cause an increase in symptoms and possible medication withdrawal symptoms. The patient is informed an emergency room evaluation may be necessary if this occurs. DISPOSITION:   The patients condition and plan were discussed at length and all questions were answered. The patient agrees with the plan. A total of 15 minutes was spent with the patient of which over half of the time was spent counseling the patient. Rosa Elena Argueta MD 2/8/2017 7:00 PM    Note: Although these clinic notes were documented by the provider at the time of the exam, they have not been proofed and are subject to transcription variance.

## 2017-03-01 ENCOUNTER — OFFICE VISIT (OUTPATIENT)
Dept: PAIN MANAGEMENT | Age: 57
End: 2017-03-01

## 2017-03-01 VITALS — SYSTOLIC BLOOD PRESSURE: 118 MMHG | HEART RATE: 77 BPM | DIASTOLIC BLOOD PRESSURE: 73 MMHG

## 2017-03-01 DIAGNOSIS — M96.1 POSTLAMINECTOMY SYNDROME, LUMBAR REGION: ICD-10-CM

## 2017-03-01 DIAGNOSIS — M54.16 LUMBAR RADICULOPATHY: ICD-10-CM

## 2017-03-01 DIAGNOSIS — M51.36 LUMBAR DEGENERATIVE DISC DISEASE: ICD-10-CM

## 2017-03-01 DIAGNOSIS — M47.816 SPONDYLOSIS OF LUMBAR REGION WITHOUT MYELOPATHY OR RADICULOPATHY: ICD-10-CM

## 2017-03-01 DIAGNOSIS — M47.817 LUMBOSACRAL SPONDYLOSIS WITHOUT MYELOPATHY: ICD-10-CM

## 2017-03-01 DIAGNOSIS — G89.4 CHRONIC PAIN SYNDROME: Primary | ICD-10-CM

## 2017-03-02 NOTE — PROGRESS NOTES
[address] [facility]  Interventional Pain Management Consultation History & Physical    PATIENT NAME:  Kathie Helton     YOB: 1960    DATE OF SERVICE:   3/1/2017      CHIEF COMPLAINT:  Back Pain; Leg Pain; and Knee Pain      HISTORY OF PRESENT ILLNESS:   Kathie Helton presents to the pain clinic today for initial evaluation and to consider interventional pain management options as indicated for the type and location of the pain the patient is presenting with. Kathie Helton patient presents for initial evaluation and consideration for interventional procedures as indicated. This is actually the first time I have seen this patient in clinic. I will evaluate her and consider interventional procedures as I see fit. At today's presentation, patient endorses chronic and severe low back pain. She has had this pain of her low back for many years. She is also begun to develop radiating leg pain. She currently endorses both low back pain as well as radiating leg pain, left more than right sided. She endorses aching sharp radiating pain from her low back down the left greater than right leg. She walks with a 4 point cane, she says this keeps her from falling. She is developed weakness of her legs due to severe low back pain. When she is shopping at [employer] she says she has to hold onto a grocery cart to keep from falling over. By the time she arrives home, she is in terrible pain. She has to lay down for several hours. Pain of her left leg travels down the left leg from the buttocks down into her foot. She is noted to have her first lumbar surgery 1993, she had her second lumbar surgery 2007. More recently we did lumbar transforaminal epidural steroid injections at L5-S1     . These did not help and in fact they actually exacerbated her pain. She is tried physical therapy as well as medications that only temporized her pain.      I have attached the notes from my previous evaluation of this patient February 8, 2017. This was actually a brief introduction to the patient, she presented for reevaluation after transforaminal epidural steroid injections. I had not met the patient before, and as stated this is my first full and thorough clinic evaluation of the patient. Kathie Helton patient presents for follow-up evaluation after her bilateral lumbar epidural steroid injections, transforaminal bilateral L5-S1 injections were done January 31, 2016. She states she obtained and no benefit from these procedures. She wishes to be evaluated for other procedures as indicated. She currently endorses ongoing severe low back pain  Which she endorses as aching throbbing and burning pain. Pain occasionally radiates down both legs. Pain is increased with standing and sitting as well as walking. She has had this pain for very long time. She further has neuropathic pain of her feet. She has had 2 previous back surgeries. We discussed options. I will be glad to discuss other procedures that may help the patient. However I have actually never even seen the patient in clinic before. I discussed with the patient that I will need to see her as an initial clinic evaluation in order to best determine possible interventional course of action. I need to review patient's records, performed physical exam, review any previous imaging and complete my data gathering. I will then be able to help the patient with any interventional pain procedures I may be able to offer her. She understands and will proceed with an initial clinic evaluation in the near future. We discussed options. Patient presents for full evaluation today, complaining of very long-standing and severe low back pain with radiating leg pain, left greater than right. She is tried a number of noninterventional approaches including physical therapy, stretching and exercises, and medications. These have not helped.   She is undergone L3-S1 decompression and fusion procedure. She is noted to have severe L5-S1 disc degeneration. Hardware appears stable. She more recently underwent transforaminal epidural steroid injections at L5-S1 with no benefit. I am precluded from doing lumbar radiofrequency neurotomy procedures where she is having most of her pain due to previous lumbar spinal fusion at these levels. As I discussed with patient, her best option would be consideration for spinal cord stimulator trial and placement. I given the patient the DVD on spinal cord stimulators. I have offered the patient to come to our informational meeting March 15 at [facility]. The pain clinic. Patient is applying for a care card. I have instructed the patient that after she receives her care card she can return to the clinic and we can discuss proceeding with spinal cord stimulator trial and placement. I will need to place a pain psychology consult with Dr. Janet Carey. I discussed risk and benefits, indications contraindications and side effects of spinal cord stimulator trial and placement with the patient. I used skeleton spine model as well as  spinal cord stimulator device to explain the procedure. She understands and has no further questions. She is amenable to proceeding with spinal cord stimulator placement. I will refer the patient to obtain her care cart and return for reevaluation. I will then place a pain psychology consult. Patient has no further questions. MRI: Imaging reviewed    PROCEDURES: Discussed spinal cord stimulator trial placement. MRI Results (most recent):    Results from [place] encounter on 17   MRI LUMB SPINE W WO CONT   Narrative Procedure: MRI of the lumbar spine with and without contrast.    CPT code: 57820    Indications: Back pain radiating down both legs. 2 prior back surgeries, most  recent in     Comparisons: None.     Technique: T1 weighted, T2 FSE with fat saturation, FSE inversion recovery, T1  weighted post contrast with fat saturation sagittal images are supplemented by  T2 weighted with fat saturation and T1 weighted pre- and post contrast axial  images. 5 cc gadavist administered via the intravenous route. Findings:    Images compromised by poor signal-to-noise and motion. MRI images suggest broad and pedicle screw fixation L3 through to S1 with  associated decompression. Disc spacer at L3-L4. Severe loss of disc height at  L5-S1. The overall appearance is stable. Sagittal images reveal otherwise normal vertebral body morphology. No fractures  noted. No osseous lesions or abnormal signal intensity present. Alignments are anatomic, no evidence for subluxation. Conus medullaris ends at the L1-L2 vertebral body level. No abnormal vertebral body, epidural, or intradural enhancement. Correlation of axial and sagittal images reveals the following:    At L1-L2: No significant disc pathology or proliferative changes. No central  canal or foraminal stenosis. At L2-L3: No significant disc pathology or proliferative changes. No central  canal or foraminal stenosis. At L3-L4: First level of fusion with disc spacer. Central canal is widely  patent. Only mild foraminal stenosis. At L4-L5: No significant disc pathology or proliferative changes. No central  canal or foraminal stenosis. At L5-S1: Severe loss of disc height. Central canal is widely patent. Moderate  bilateral foraminal stenosis. Visualized portions of the sacroiliac joints are unremarkable. Incidentally  imaged retroperitoneal structures are unremarkable as well. Impression Impression:    Postoperative changes as above. No critical stenosis noted. PAST MEDICAL HISTORY:   The patient  has a past medical history of Arrhythmia; Depression; Headache(784.0); and UTI (urinary tract infection).     PAST SURGICAL HISTORY:   The patient  has a past surgical history that includes appendectomy; tonsillectomy; orthopaedic (2007); and gyn (1989). CURRENT MEDICATIONS:   The patient has a current medication list which includes the following prescription(s): carisoprodol, oxycodone ir, oxycodone ir, dexamethasone, hydrochlorothiazide, linaclotide, benzonatate, topiramate, haloperidol, dicyclomine, mirtazapine, docusate sodium, sertraline, gabapentin, cranberry fruit extract, clonazepam, OTHER, metoprolol succinate, and multivitamin,stress formula. ALLERGIES:     Allergies   Allergen Reactions    Dilaudid [Hydromorphone (Bulk)] Palpitations     tachycardia    Augmentin [Amoxicillin-Pot Clavulanate] Nausea and Vomiting    Aspirin Other (comments)     tinnitus    Ceclor [Cefaclor] Other (comments)     Patient not sure of reaction    Codeine Other (comments)     GI       FAMILY HISTORY:   The patient family history includes Cancer in an other family member; Coronary Artery Disease in an other family member; Diabetes in her father and mother; Hypertension in her father and mother. SOCIAL HISTORY:   The patient  reports that she has been smoking Cigarettes. She has been smoking about 0.50 packs per day. She has never used smokeless tobacco. The patient  reports that she does not drink alcohol. She also  reports that she does not use illicit drugs. REVIEW OF SYSTEMS:   The patient denies fever, chills, weight loss (Constitutional), rash, itching (Skin), tinnitus, congestion (HENT), blurred vision, photophobia (Eyes), palpitations, orthopnea (Cardiovascular), hemoptysis, wheezing (Respiratory), nausea, vomiting, diarrhea (Gastrointestinal), dysuria, hematuria, urgency (Genitourinary), easy bruising, bleeding abnormalities (Hematologic), bowel or bladder incontinence, loss of consciousness (Neurologic), suicidal or homicidal ideation or hallucinations (Psychiatric).          PHYSICAL EXAM:  VS:   Visit Vitals    /73 (BP 1 Location: Right arm, BP Patient Position: Sitting)    Pulse 77     General: Well-developed and well-nourished. Body habitus consistent with recorded height and weight and the calculated BMI. Apparent distress due to low back and radiating leg pain. Head: Normocephalic, atraumatic. Skin: Inspection of the skin reveals no rashes, lesions or infection. CV: Regular rate. No murmurs or rubs noted. No peripheral edema noted. Pulm: Respirations are even and unlabored. Extr: No clubbing, cyanosis, or edema noted. Musculoskeletal:  1. Cervical spine - Full ROM. No paraspinous tenderness at any level. There is no scoliosis, asymmetry, or musculoskeletal defect. 2. Thoracic spine - Full ROM. No paraspinous tenderness at any level. There is no scoliosis, asymmetry, or musculoskeletal defect. 3. Lumbar spine -decreased range of motion all axes . Paraspinous tenderness throughout the lumbar spine. SI joints are nontender bilaterally. There is no scoliosis, asymmetry, or musculoskeletal defect. 4. Right upper extremity - Full ROM. 5/5 muscle strength in all muscle groups. No pain or tenderness in shoulder, elbow, wrist, or hand. 5. Left upper extremity - Full ROM. 5/5 muscle strength in all muscle groups. No pain or tenderness in shoulder, elbow, wrist, or hand. 6. Right lower extremity - Full ROM. 5/5 muscle strength in all muscle groups. No pain, tenderness, or swelling in the hip, knee, ankle or foot. 7. Left lower extremity - Full ROM. 5/5 muscle strength in all muscle groups. No pain, tenderness, or swelling in the hip, knee, ankle or foot. Neurological:  1. Mental Status - Alert, awake and oriented. Speech is clear and appropriate. 2. Cranial Nerves - Extraocular muscles intact bilaterally. Cranial nerves II-XII grossly intact bilaterally. 3. Gait - antalgic   4. Reflexes - 2+ and symmetric throughout. 5. Sensation - Intact to light touch and pin prick. 6. Provocative Tests - Spurlings negative bilaterally.  Straight leg raise negative bilaterally. Psychological:  1. Mood and affect - Appropriate. 2. Speech - Appropriate. 3. Though content - Appropriate. 4. Judgment - Appropriate. ASSESSMENT:      ICD-10-CM ICD-9-CM    1. Chronic pain syndrome G89.4 338.4    2. Postlaminectomy syndrome, lumbar region M96.1 722.83    3. Lumbosacral spondylosis without myelopathy M47.817 721.3    4. Spondylosis of lumbar region without myelopathy or radiculopathy M47.816 721.3    5. Lumbar degenerative disc disease M51.36 722.52    6. Lumbar radiculopathy M54.16 724.4            PLAN:    1. Diagnoses/Plan: Chronic pain syndrome, lumbar postlaminectomy syndrome, lumbosacral spondylosis, lumbar degenerative disc disease, lumbar radiculopathy. We discussed options. Patient presents for full evaluation today, complaining of very long-standing and severe low back pain with radiating leg pain, left greater than right. She is tried a number of noninterventional approaches including physical therapy, stretching and exercises, and medications. These have not helped. She is undergone L3-S1 decompression and fusion procedure. She is noted to have severe L5-S1 disc degeneration. Hardware appears stable. She more recently underwent transforaminal epidural steroid injections at L5-S1 with no benefit. I am precluded from doing lumbar radiofrequency neurotomy procedures where she is having most of her pain due to previous lumbar spinal fusion at these levels. As I discussed with patient, her best option would be consideration for spinal cord stimulator trial and placement. I given the patient the DVD on spinal cord stimulators. I have offered the patient to come to our informational meeting March 15 at [facility]. The pain clinic. Patient is applying for a care card. I have instructed the patient that after she receives her care card she can return to the clinic and we can discuss proceeding with spinal cord stimulator trial and placement.   I will need to place a pain psychology consult with Dr. Damaso Smith. I discussed risk and benefits, indications contraindications and side effects of spinal cord stimulator trial and placement with the patient. I used skeleton spine model as well as  spinal cord stimulator device to explain the procedure. She understands and has no further questions. She is amenable to proceeding with spinal cord stimulator placement. I will refer the patient to obtain her care cart and return for reevaluation. I will then place a pain psychology consult. Patient has no further questions. 2.    I have thoroughly discussed the risks and benefits, side effects and complications, of any and all procedures that were mentioned at today's patient visit. I have used a skeleton model for added emphasis and patient education. I have answered all questions, and I have obtained verbal confirmation for all procedures planned with the patient. 3.    I have reviewed in great detail today the patient's MRI and other imaging studies with the patient. I have explained to the patient their condition using both actual recent and relevant images. I have used a skeleton model for added emphasis as well as patient education. 4.    I have advised patient to have a primary care provider to continue care for health maintenance and general medical conditions and support for referral to specialty care as needed. 5.    I have reviewed with patient the treatment plan, goals of treatment plan, and limitations of treatment plan, to include the potential for side effects from medications and procedures. If side effects occur, it is the responsibility of the patient to inform the clinic so that a change in the treatment plan can be made in a safe manner. The patient is advised that stopping prescribed medication may cause an increase in symptoms and possible medication withdrawal symptoms.  The patient is informed an emergency room evaluation may be necessary if this occurs. DISPOSITION:   The patients condition and plan were discussed at length and all questions were answered. The patient agrees with the plan. A total of 40 minutes was spent with the patient of which over half of the time was spent counseling the patient. Estefanía Lacy MD 3/1/2017 7:01 PM    Note: Although these clinic notes were documented by the provider at the time of the exam, they have not been proofed and are subject to transcription variance.

## 2017-04-19 ENCOUNTER — OFFICE VISIT (OUTPATIENT)
Dept: PAIN MANAGEMENT | Age: 57
End: 2017-04-19

## 2017-04-19 VITALS — BODY MASS INDEX: 23.78 KG/M2 | WEIGHT: 130 LBS

## 2017-04-19 DIAGNOSIS — M47.817 LUMBOSACRAL SPONDYLOSIS WITHOUT MYELOPATHY: ICD-10-CM

## 2017-04-19 DIAGNOSIS — G47.00 PERSISTENT DISORDER OF INITIATING OR MAINTAINING SLEEP: ICD-10-CM

## 2017-04-19 DIAGNOSIS — G89.4 CHRONIC PAIN SYNDROME: ICD-10-CM

## 2017-04-19 DIAGNOSIS — G89.29 BILATERAL CHRONIC KNEE PAIN: ICD-10-CM

## 2017-04-19 DIAGNOSIS — M25.562 BILATERAL CHRONIC KNEE PAIN: ICD-10-CM

## 2017-04-19 DIAGNOSIS — M79.7 FIBROMYALGIA: Primary | ICD-10-CM

## 2017-04-19 DIAGNOSIS — I01.1 RHEUMATOID AORTITIS: ICD-10-CM

## 2017-04-19 DIAGNOSIS — M25.50 PAIN IN JOINT, MULTIPLE SITES: ICD-10-CM

## 2017-04-19 DIAGNOSIS — M54.12 BRACHIAL NEURITIS OR RADICULITIS: ICD-10-CM

## 2017-04-19 DIAGNOSIS — M96.1 POSTLAMINECTOMY SYNDROME, LUMBAR REGION: ICD-10-CM

## 2017-04-19 DIAGNOSIS — Z79.899 ENCOUNTER FOR LONG-TERM (CURRENT) USE OF OTHER MEDICATIONS: ICD-10-CM

## 2017-04-19 DIAGNOSIS — M25.561 BILATERAL CHRONIC KNEE PAIN: ICD-10-CM

## 2017-04-19 DIAGNOSIS — M54.16 LUMBAR RADICULOPATHY: ICD-10-CM

## 2017-04-19 RX ORDER — OXYCODONE HYDROCHLORIDE 30 MG/1
30 TABLET ORAL
Qty: 150 TAB | Refills: 0 | Status: SHIPPED | OUTPATIENT
Start: 2017-06-18 | End: 2018-04-02

## 2017-04-19 RX ORDER — MELOXICAM 15 MG/1
15 TABLET ORAL DAILY
Qty: 30 TAB | Refills: 5 | Status: SHIPPED | OUTPATIENT
Start: 2017-04-19 | End: 2017-05-19

## 2017-04-19 RX ORDER — OXYCODONE HYDROCHLORIDE 30 MG/1
30 TABLET ORAL
Qty: 150 TAB | Refills: 0 | Status: SHIPPED | OUTPATIENT
Start: 2017-05-20 | End: 2017-07-03 | Stop reason: SDUPTHER

## 2017-04-19 RX ORDER — OXYCODONE HYDROCHLORIDE 30 MG/1
30 TABLET ORAL
Qty: 150 TAB | Refills: 0 | Status: SHIPPED | OUTPATIENT
Start: 2017-04-22 | End: 2017-07-03 | Stop reason: SDUPTHER

## 2017-04-19 NOTE — PROGRESS NOTES
HISTORY OF PRESENT ILLNESS  Kathie Caicedo is a 61 y.o. female. HPI she returns for follow-up of chronic, severe pain involving the lumbar spine with radiation down the posterior left greater than right lower extremities to the mid calf associated with numbness and tingling. She suffers from possible rheumatoid arthritis as well as a post lumbar laminectomy pain syndrome and cervical and lumbar degenerative disc disease. She has been experiencing increasing mid and low back pain as well as polyarticular pain with particular involvement of both knees, feet, hands, and the cervical spine. A trial of Mobic, 15 mg, daily will be added to her regimen her arthritic symptoms. Pain level today 8 out of 10, outcome 17/28,(The lower the upper number, the better the outcome)  Physical activity and mobility, mood, and sleep continue to be poor. No reported side effects. A current review of the  does not identify any inconsistency. Review of Systems   Constitutional: Negative. Gastrointestinal: Positive for constipation (at times). Has started taking Fiber Choice and eating dried prunes   Musculoskeletal: Positive for back pain, joint pain, myalgias and neck pain. Neurological: Positive for tingling. Psychiatric/Behavioral: Positive for depression. Negative for suicidal ideas. The patient is nervous/anxious and has insomnia. All other systems reviewed and are negative. Physical Exam   Constitutional: She is oriented to person, place, and time. She appears well-developed and well-nourished. No distress. thin   HENT:   Head: Normocephalic. Eyes: Conjunctivae and EOM are normal. Pupils are equal, round, and reactive to light.   glasses   Neck: Normal range of motion. No thyromegaly present. Painful ROM   Pulmonary/Chest: Effort normal. No respiratory distress. Musculoskeletal: She exhibits tenderness (neck/lumbar). She exhibits no edema.         Right knee: She exhibits decreased range of motion. Tenderness found. Left knee: Tenderness found. Cervical back: She exhibits decreased range of motion, tenderness, pain and spasm. Thoracic back: She exhibits tenderness, pain and spasm. Lumbar back: She exhibits decreased range of motion, tenderness, pain and spasm. Back:    Neurological: She is alert and oriented to person, place, and time. No cranial nerve deficit (grossly intact). Gait (antalgic) abnormal.   Psychiatric: She has a normal mood and affect. Her behavior is normal. Judgment and thought content normal.   Nursing note and vitals reviewed. ASSESSMENT and PLAN  Encounter Diagnoses   Name Primary?  Fibromyalgia Yes    Chronic pain syndrome     Persistent disorder of initiating or maintaining sleep     Brachial neuritis or radiculitis     Lumbar radiculopathy     Rheumatoid aortitis (HCC)     Lumbosacral spondylosis without myelopathy     Encounter for long-term (current) use of other medications     Postlaminectomy syndrome, lumbar region     Pain in joint, multiple sites     Bilateral chronic knee pain      She will continue on her current analgesic regimen with adjustments to the treatment plan as noted above. Because the patient's current regimen places him/her at increased risk for possible overdose, a prescription for naloxone nasal spray is being provided. The patient understands that this medication is only to be used in the setting of a possible overdose and that inadvertent use of this medication could precipitate overt withdrawal.    3 month reassess her    No concerns are raised for misuse, abuse, or diversion. 1. Pain medications are prescribed with the objective of pain relief and improved physical and psychosocial function in this patient. 2. Counseled patient on proper use of prescribed medications and reviewed opioid contract.   3. Counseled patient about chronic medical conditions and their relationship to anxiety and depression and recommended mental health support as needed. 4. Reviewed with patient self-help tools, home exercise, and lifestyle changes to assist the patient in self-management of symptoms. 5. Advised patient to have a primary care provider to continue care for health maintenance and general medical conditions and support for referral to specialty care as needed. 6. Reviewed with patient the treatment plan, goals of treatment plan, and limitations of treatment plan, to include the potential for side effects from medications and procedures. If side effects occur, it is the responsibility of the patient to inform the clinic so that a change in the treatment plan can be made in a safe manner. The patient is advised that stopping prescribed medication may cause an increase in symptoms and possible medication withdrawal symptoms. The patient is informed an emergency room evaluation may be necessary if this occurs. DISPOSITION: The patients condition and plan were discussed at length and all questions were answered. The patient agrees with the plan.     Counseling occupied > 50% of visit:  Total time: 40 minutes

## 2017-04-19 NOTE — MR AVS SNAPSHOT
Visit Information Date & Time Provider Department Dept. Phone Encounter #  
 4/19/2017  3:00 PM Alicia Cruz MD [facility] [address] for Pain Management [phone] Follow-up Instructions Return in about 3 months (around 7/19/2017). Your Appointments 7/3/2017  2:00 PM  
Follow Up with Alicia Cruz MD  
[address] for Pain Management [facility]) [address] [place] [address]  
[phone] [facility] [phone] Upcoming Health Maintenance Date Due Hepatitis C Screening 1960 Pneumococcal 19-64 Medium Risk (1 of 1 - PPSV23) 7/11/1978 DTaP/Tdap/Td series (1 - Tdap) 7/11/1980 PAP AKA CERVICAL CYTOLOGY 7/11/1980 BREAST CANCER SCRN MAMMOGRAM 7/11/2009 FOBT Q 1 YEAR AGE 50-75 7/11/2009 INFLUENZA AGE 9 TO ADULT 8/1/2016 Allergies as of 4/19/2017  Review Complete On: 4/19/2017 By: Johnny Velásquez RN Severity Noted Reaction Type Reactions Dilaudid [Hydromorphone (Bulk)] High 01/31/2017    Palpitations  
 tachycardia Augmentin [Amoxicillin-pot Clavulanate] Medium 01/31/2017    Nausea and Vomiting Aspirin    Other (comments)  
 tinnitus Ceclor [Cefaclor]  01/31/2017    Other (comments) Patient not sure of reaction Codeine    Other (comments) GI  
  
Current Immunizations  Never Reviewed No immunizations on file. Not reviewed this visit You Were Diagnosed With   
  
 Codes Comments Chronic pain syndrome     ICD-10-CM: G89.4 ICD-9-CM: 338. 4 Vitals Weight(growth percentile) BMI OB Status Smoking Status 130 lb (59 kg) 23.78 kg/m2 Hysterectomy Current Every Day Smoker BMI and BSA Data Body Mass Index Body Surface Area 23.78 kg/m 2 1.61 m 2 Preferred Pharmacy Pharmacy Name Phone  [address], [address] [place] [address],[address] Your Updated Medication List  
  
   
This list is accurate as of: 4/19/17  3:46 PM.  Always use your most recent med list.  
  
  
  
  
 benzonatate 200 mg capsule Commonly known as:  TESSALON Take 200 mg by mouth three (3) times daily as needed for Cough. CRANBERRY PO Take 3,600 mg by mouth daily. dexamethasone 4 mg tablet Commonly known as:  DECADRON Taper daily as follows: 5-4-3-2-1  
  
 dicyclomine 20 mg tablet Commonly known as:  BENTYL Take 20 mg by mouth every six (6) hours. DOCUSOFT S 100 mg capsule Generic drug:  docusate sodium Take 100 mg by mouth two (2) times a day.  
  
 gabapentin 600 mg tablet Commonly known as:  NEURONTIN  
1 po bid x 14 days, then 1 po tid  Indications: NEUROPATHIC PAIN  
  
 haloperidol 0.5 mg tablet Commonly known as:  HALDOL Take 0.5 mg by mouth. hydroCHLOROthiazide 12.5 mg tablet Commonly known as:  HYDRODIURIL Take 1 Tab by mouth daily. KlonoPIN 1 mg tablet Generic drug:  clonazePAM  
Take 1 mg by mouth three (3) times daily. linaclotide 145 mcg Cap capsule Commonly known as:  Charmayne Guile Take  by mouth Daily (before breakfast). meloxicam 15 mg tablet Commonly known as:  MOBIC Take 1 Tab by mouth daily for 30 days. mirtazapine 45 mg tablet Commonly known as:  Pearletha Pain Take 45 mg by mouth nightly. OTHER Take 1 Tab by mouth daily. I cool tabs * oxyCODONE IR 30 mg immediate release tablet Commonly known as:  OXY-IR Take 1 Tab by mouth five (5) times daily for 30 days. for chronic, severe, refractory pain  . Indications: NEUROPATHIC PAIN, PAIN  
  
 * oxyCODONE IR 30 mg immediate release tablet Commonly known as:  OXY-IR Take 1 Tab by mouth five (5) times daily for 30 days. for chronic, severe, refractory pain  Indications: NEUROPATHIC PAIN, Pain Start taking on:  4/22/2017 * oxyCODONE IR 30 mg immediate release tablet Commonly known as:  OXY-IR Take 1 Tab by mouth five (5) times daily for 30 days. for chronic, severe, refractory pain  . Indications: NEUROPATHIC PAIN, Pain Start taking on:  5/20/2017 * oxyCODONE IR 30 mg immediate release tablet Commonly known as:  OXY-IR Take 1 Tab by mouth five (5) times daily for 30 days. for chronic, severe, refractory pain  Indications: NEUROPATHIC PAIN, Pain Start taking on:  6/18/2017 SOMA 350 mg tablet Generic drug:  carisoprodol Take 350 mg by mouth four (4) times daily. STRESSTABS PO Take  by mouth daily. TOPAMAX 50 mg tablet Generic drug:  topiramate Take 50 mg by mouth Every Mon, Wed & Sun.  
  
 TOPROL XL 50 mg XL tablet Generic drug:  metoprolol succinate Take  by mouth daily. ZOLOFT 25 mg tablet Generic drug:  sertraline Take 100 mg by mouth two (2) times a day. * Notice: This list has 4 medication(s) that are the same as other medications prescribed for you. Read the directions carefully, and ask your doctor or other care provider to review them with you. Prescriptions Printed Refills  
 oxyCODONE IR (OXY-IR) 30 mg immediate release tablet 0 Starting on: 6/18/2017 Sig: Take 1 Tab by mouth five (5) times daily for 30 days. for chronic, severe, refractory pain  Indications: NEUROPATHIC PAIN, Pain Class: Print Route: Oral  
 oxyCODONE IR (OXY-IR) 30 mg immediate release tablet 0 Starting on: 5/20/2017 Sig: Take 1 Tab by mouth five (5) times daily for 30 days. for chronic, severe, refractory pain Rena Becker Indications: NEUROPATHIC PAIN, Pain Class: Print Route: Oral  
 oxyCODONE IR (OXY-IR) 30 mg immediate release tablet 0 Starting on: 4/22/2017 Sig: Take 1 Tab by mouth five (5) times daily for 30 days. for chronic, severe, refractory pain  Indications: NEUROPATHIC PAIN, Pain Class: Print Route: Oral  
  
Prescriptions Sent to Pharmacy Refills meloxicam (MOBIC) 15 mg tablet 5 Sig: Take 1 Tab by mouth daily for 30 days. Class: Normal  
 Pharmacy: [facility] [address] [place] [address] Ester Chan [address] [place] [address] #: [phone] Route: Oral  
  
Follow-up Instructions Return in about 3 months (around 7/19/2017). Patient Instructions Current health maintenance issues were reviewed and the patient was advised to followup with his/her PCP for completion of these items. Introducing [facility] & [facility] SERVICES! Dear Soni Mckeon: 
Thank you for requesting a [employer] account. Our records indicate that you already have an active [employer] account. You can access your account anytime at [email or web address] Did you know that you can access your hospital and ER discharge instructions at any time in [employer]? You can also review all of your test results from your hospital stay or ER visit. Additional Information If you have questions, please visit the Frequently Asked Questions section of the [employer] website at [email or web address] Remember, [employer] is NOT to be used for urgent needs. For medical emergencies, dial 911. Now available from your iPhone and Android! Please provide this summary of care documentation to your next provider. Your primary care clinician is listed as Mayer Sicard. If you have any questions after today's visit, please call [phone].

## 2017-04-19 NOTE — PROGRESS NOTES
Nursing Notes    Patient presents to the office today in follow-up. Patient rates her pain at 8/10 on the numerical pain scale. Reviewed medications with counts as follows:    Rx Date filled Qty Dispensed Pill Count Last Dose Short   domonique 30 3/24/17 150 20 4/19/17 no       Comments:     POC UDS was not performed in office today    Any new labs or imaging since last appointment? YES, MRI of knee at [address] [place] [address] Drive you been to an emergency room (ER) or urgent care clinic since your last visit? NO            Have you been hospitalized since your last visit? NO     If yes, where, when, and reason for visit? Have you seen or consulted any other health care providers outside of the [employer] [facility]  since your last visit? YES     If yes, where, when, and reason for visit? [facility] for MRI, /ortho    Ms. Helton has a reminder for a \"due or due soon\" health maintenance. I have asked that she contact her primary care provider for follow-up on this health maintenance.

## 2017-04-20 RX ORDER — NALOXONE HYDROCHLORIDE 4 MG/.1ML
1 SPRAY NASAL ONCE
Qty: 1 BOX | Refills: 1 | Status: SHIPPED | OUTPATIENT
Start: 2017-04-20 | End: 2017-04-20

## 2017-07-03 ENCOUNTER — OFFICE VISIT (OUTPATIENT)
Dept: PAIN MANAGEMENT | Age: 57
End: 2017-07-03

## 2017-07-03 VITALS
BODY MASS INDEX: 23.78 KG/M2 | SYSTOLIC BLOOD PRESSURE: 111 MMHG | HEART RATE: 73 BPM | DIASTOLIC BLOOD PRESSURE: 68 MMHG | WEIGHT: 130 LBS

## 2017-07-03 DIAGNOSIS — M54.12 BRACHIAL NEURITIS OR RADICULITIS: ICD-10-CM

## 2017-07-03 DIAGNOSIS — M47.817 LUMBOSACRAL SPONDYLOSIS WITHOUT MYELOPATHY: ICD-10-CM

## 2017-07-03 DIAGNOSIS — N39.41 URGENCY INCONTINENCE: ICD-10-CM

## 2017-07-03 DIAGNOSIS — M79.7 FIBROMYALGIA: ICD-10-CM

## 2017-07-03 DIAGNOSIS — M54.16 LUMBAR RADICULOPATHY: ICD-10-CM

## 2017-07-03 DIAGNOSIS — Z79.899 ENCOUNTER FOR LONG-TERM (CURRENT) USE OF OTHER MEDICATIONS: ICD-10-CM

## 2017-07-03 DIAGNOSIS — M96.1 POSTLAMINECTOMY SYNDROME, LUMBAR REGION: Primary | ICD-10-CM

## 2017-07-03 DIAGNOSIS — G47.00 PERSISTENT DISORDER OF INITIATING OR MAINTAINING SLEEP: ICD-10-CM

## 2017-07-03 DIAGNOSIS — Z79.899 HIGH RISK MEDICATION USE: ICD-10-CM

## 2017-07-03 DIAGNOSIS — M25.50 PAIN IN JOINT, MULTIPLE SITES: ICD-10-CM

## 2017-07-03 DIAGNOSIS — G89.4 CHRONIC PAIN SYNDROME: ICD-10-CM

## 2017-07-03 DIAGNOSIS — M62.838 SPASM OF MUSCLE: ICD-10-CM

## 2017-07-03 LAB
ALCOHOL UR POC: NORMAL
AMPHETAMINES UR POC: NEGATIVE
BARBITURATES UR POC: NEGATIVE
BENZODIAZEPINES UR POC: NEGATIVE
BUPRENORPHINE UR POC: NORMAL
CANNABINOIDS UR POC: NEGATIVE
CARISOPRODOL UR POC: NORMAL
COCAINE UR POC: NEGATIVE
FENTANYL UR POC: NORMAL
MDMA/ECSTASY UR POC: NEGATIVE
METHADONE UR POC: NEGATIVE
METHAMPHETAMINE UR POC: NEGATIVE
METHYLPHENIDATE UR POC: NEGATIVE
OPIATES UR POC: NORMAL
OXYCODONE UR POC: NORMAL
PHENCYCLIDINE UR POC: NEGATIVE
PROPOXYPHENE UR POC: NEGATIVE
TRAMADOL UR POC: NEGATIVE
TRICYCLICS UR POC: NEGATIVE

## 2017-07-03 RX ORDER — SOLIFENACIN SUCCINATE 10 MG/1
10 TABLET, FILM COATED ORAL DAILY
COMMUNITY
End: 2019-07-29

## 2017-07-03 RX ORDER — POTASSIUM CHLORIDE 750 MG/1
10 CAPSULE, EXTENDED RELEASE ORAL 2 TIMES DAILY
COMMUNITY
End: 2019-07-29

## 2017-07-03 RX ORDER — OXYCODONE HYDROCHLORIDE 30 MG/1
30 TABLET ORAL
Qty: 150 TAB | Refills: 0 | Status: SHIPPED | OUTPATIENT
Start: 2017-07-20 | End: 2018-04-02

## 2017-07-03 RX ORDER — OXYCODONE HYDROCHLORIDE 30 MG/1
30 TABLET ORAL
Qty: 150 TAB | Refills: 0 | Status: SHIPPED | OUTPATIENT
Start: 2017-09-18 | End: 2017-10-05

## 2017-07-03 RX ORDER — OXYCODONE HYDROCHLORIDE 30 MG/1
30 TABLET ORAL
Qty: 150 TAB | Refills: 0 | Status: SHIPPED | OUTPATIENT
Start: 2017-08-18 | End: 2018-04-02

## 2017-07-03 NOTE — PROGRESS NOTES
Nursing Notes    Patient presents to the office today in follow-up. Patient rates her pain at 7/10 on the numerical pain scale. Reviewed medications with counts as follows:    Rx Date filled Qty Dispensed Pill Count Last Dose Short   Oxycodone 30 mg 06/22/17 150 90 today Yes 15         Comments: Patient states she has 15  pills at home in her pill case  She has 4 pills with her now in a case   Patient has low potassium she is taking a potassium script     POC UDS was performed in office today    Any new labs or imaging since last appointment? YES PCP did labs wbc was high was put on abx by PCP   Have you been to an emergency room (ER) or urgent care clinic since your last visit? NO            Have you been hospitalized since your last visit? NO     If yes, where, when, and reason for visit? Have you seen or consulted any other health care providers outside of the [address] [place] [address]  since your last visit? NO     If yes, where, when, and reason for visit? Ms. Tay Galloway has a reminder for a \"due or due soon\" health maintenance.  I have asked that she contact her primary care provider for follow-up on this health maintenance

## 2017-07-03 NOTE — MR AVS SNAPSHOT
Visit Information Date & Time Provider Department Dept. Phone Encounter #  
 7/3/2017  2:00 PM Wyatt Walker MD [address] for Pain Management [id number] Follow-up Instructions Return in about 3 months (around 10/3/2017). Your Appointments 9/27/2017  2:20 PM  
Follow Up with Wyatt Walker MD  
[address] for Pain Management 3651 [facility]) Appt Note: Return in about 3 months (around 10/3/2017). 30 [facility] [address]  
[phone] [facility] [phone] Upcoming Health Maintenance Date Due Hepatitis C Screening 1960 Pneumococcal 19-64 Medium Risk (1 of 1 - PPSV23) 7/11/1978 DTaP/Tdap/Td series (1 - Tdap) 7/11/1980 PAP AKA CERVICAL CYTOLOGY 7/11/1980 BREAST CANCER SCRN MAMMOGRAM 7/11/2009 FOBT Q 1 YEAR AGE 50-75 7/11/2009 INFLUENZA AGE 9 TO ADULT 8/1/2017 Allergies as of 7/3/2017  Review Complete On: 7/3/2017 By: Wyatt Walker MD  
  
 Severity Noted Reaction Type Reactions Dilaudid [Hydromorphone (Bulk)] High 01/31/2017    Palpitations  
 tachycardia Augmentin [Amoxicillin-pot Clavulanate] Medium 01/31/2017    Nausea and Vomiting Aspirin    Other (comments)  
 tinnitus Ceclor [Cefaclor]  01/31/2017    Other (comments) Patient not sure of reaction Codeine    Other (comments) GI  
  
Current Immunizations  Never Reviewed No immunizations on file. Not reviewed this visit You Were Diagnosed With   
  
 Codes Comments Chronic pain syndrome     ICD-10-CM: G89.4 ICD-9-CM: 338. 4 Vitals BP Pulse Weight(growth percentile) BMI OB Status Smoking Status 111/68 73 130 lb (59 kg) 23.78 kg/m2 Hysterectomy Current Every Day Smoker BMI and BSA Data Body Mass Index Body Surface Area 23.78 kg/m 2 1.61 m 2 Preferred Pharmacy Pharmacy Name Phone [facility] Ester Chan 16 214 [facility] [phone] Your Updated Medication List  
  
   
This list is accurate as of: 7/3/17  3:08 PM.  Always use your most recent med list.  
  
  
  
  
 benzonatate 200 mg capsule Commonly known as:  TESSALON Take 200 mg by mouth three (3) times daily as needed for Cough. CRANBERRY PO Take 3,600 mg by mouth daily. dexamethasone 4 mg tablet Commonly known as:  DECADRON Taper daily as follows: 5-4-3-2-1  
  
 dicyclomine 20 mg tablet Commonly known as:  BENTYL Take 20 mg by mouth every six (6) hours. DOCUSOFT S 100 mg capsule Generic drug:  docusate sodium Take 100 mg by mouth two (2) times a day.  
  
 gabapentin 600 mg tablet Commonly known as:  NEURONTIN  
1 po bid x 14 days, then 1 po tid  Indications: NEUROPATHIC PAIN  
  
 haloperidol 0.5 mg tablet Commonly known as:  HALDOL Take 0.5 mg by mouth. hydroCHLOROthiazide 12.5 mg tablet Commonly known as:  HYDRODIURIL Take 1 Tab by mouth daily. KlonoPIN 1 mg tablet Generic drug:  clonazePAM  
Take 1 mg by mouth three (3) times daily. linaclotide 145 mcg Cap capsule Commonly known as:  Carter Joelleen Take  by mouth Daily (before breakfast). mirtazapine 45 mg tablet Commonly known as:  Benerony Harringtonand Take 45 mg by mouth nightly. OTHER Take 1 Tab by mouth daily. I cool tabs * oxyCODONE IR 30 mg immediate release tablet Commonly known as:  OXY-IR Take 1 Tab by mouth five (5) times daily for 30 days. for chronic, severe, refractory pain  Indications: NEUROPATHIC PAIN, Pain * oxyCODONE IR 30 mg immediate release tablet Commonly known as:  OXY-IR Take 1 Tab by mouth five (5) times daily for 30 days. for chronic, severe, refractory pain  . Indications: NEUROPATHIC PAIN, Pain Start taking on:  7/20/2017 * oxyCODONE IR 30 mg immediate release tablet Commonly known as:  OXY-IR  
 Take 1 Tab by mouth five (5) times daily for 30 days. for chronic, severe, refractory pain  Indications: NEUROPATHIC PAIN, Pain Start taking on:  8/18/2017 * oxyCODONE IR 30 mg immediate release tablet Commonly known as:  OXY-IR Take 1 Tab by mouth five (5) times daily for 30 days. for chronic, severe, refractory pain  . Indications: NEUROPATHIC PAIN, Pain Start taking on:  9/18/2017 potassium chloride SA 10 mEq capsule Commonly known as:  Salvadore Honey Take 10 mEq by mouth two (2) times a day. solifenacin 10 mg tablet Commonly known as:  Berta Beagle Take 10 mg by mouth daily. SOMA 350 mg tablet Generic drug:  carisoprodol Take 350 mg by mouth four (4) times daily. STRESSTABS PO Take  by mouth daily. TOPAMAX 50 mg tablet Generic drug:  topiramate Take 50 mg by mouth Every Mon, Wed & Sun.  
  
 TOPROL XL 50 mg XL tablet Generic drug:  metoprolol succinate Take  by mouth daily. ZOLOFT 25 mg tablet Generic drug:  sertraline Take 100 mg by mouth two (2) times a day. * Notice: This list has 4 medication(s) that are the same as other medications prescribed for you. Read the directions carefully, and ask your doctor or other care provider to review them with you. Prescriptions Printed Refills  
 oxyCODONE IR (OXY-IR) 30 mg immediate release tablet 0 Starting on: 9/18/2017 Sig: Take 1 Tab by mouth five (5) times daily for 30 days. for chronic, severe, refractory pain Audrey Dailey Indications: NEUROPATHIC PAIN, Pain Class: Print Route: Oral  
 oxyCODONE IR (OXY-IR) 30 mg immediate release tablet 0 Starting on: 8/18/2017 Sig: Take 1 Tab by mouth five (5) times daily for 30 days. for chronic, severe, refractory pain  Indications: NEUROPATHIC PAIN, Pain Class: Print Route: Oral  
 oxyCODONE IR (OXY-IR) 30 mg immediate release tablet 0 Starting on: 7/20/2017 Sig: Take 1 Tab by mouth five (5) times daily for 30 days. for chronic, severe, refractory pain Goldie Teixeira Indications: NEUROPATHIC PAIN, Pain Class: Print Route: Oral  
  
Follow-up Instructions Return in about 3 months (around 10/3/2017). Patient Instructions Current health maintenance issues were reviewed and the patient was advised to followup with his/her PCP for completion of these items. Introducing [facility] & HEALTH SERVICES! Dear Pamela Diallo: 
Thank you for requesting a [employer] account. Our records indicate that you already have an active [employer] account. You can access your account anytime at [email or web address] Did you know that you can access your hospital and ER discharge instructions at any time in [employer]? You can also review all of your test results from your hospital stay or ER visit. Additional Information If you have questions, please visit the Frequently Asked Questions section of the [employer] website at [email or web address] Remember, [employer] is NOT to be used for urgent needs. For medical emergencies, dial 911. Now available from your iPhone and Android! Please provide this summary of care documentation to your next provider. Your primary care clinician is listed as Cami Ruiz. If you have any questions after today's visit, please call [phone].

## 2017-07-03 NOTE — PROGRESS NOTES
HISTORY OF PRESENT ILLNESS  Kathie Aranda is a 61 y.o. female. HPI she returns for follow-up of chronic, severe pain involving the lumbar spine with radiation down the posterior left greater than right lower extremities to the mid calf associated with numbness and tingling. She suffers from possible rheumatoid arthritis as well as a post lumbar laminectomy pain syndrome and cervical and lumbar degenerative disc disease. She is also complaining today of several months of urgency incontinence of urine. This only happens at night. She will awaken with a sensation that she has to go urinate but loses control of her urine before she gets to the bathroom. She does report 1 or 2 episodes of actual incontinence while sleeping. She was recently given a prescription by her PCP for Vesicare but this seems to have only made things worse. I have discussed the diagnostic possibilities with patient and have suggested that a urologic evaluation with urodynamic studies should be considered. She has an appointment with her PCP for later in the week and will discuss this further. Pain remains under reasonable control, averaging 7 out of 10 with 40% overall relief. Pain level today 7 out of 10, outcome 14/28,(The lower the upper number, the better the outcome)  Physical activity and mobility as well as sleep are fair, mood is poor. No reported side effects. A current review of the  does not identify any inconsistency. UDS obtained and reviewed; formal confirmation from laboratory is pending. Review of Systems   Constitutional: Negative. Gastrointestinal: Positive for constipation (at times). Has started taking Fiber Choice and eating dried prunes   Musculoskeletal: Positive for back pain, joint pain, myalgias and neck pain. Neurological: Positive for tingling. Psychiatric/Behavioral: Positive for depression. Negative for suicidal ideas. The patient is nervous/anxious and has insomnia.     All other systems reviewed and are negative. Physical Exam   Constitutional: She is oriented to person, place, and time. She appears well-developed and well-nourished. She appears distressed. thin   HENT:   Head: Normocephalic. Eyes: Conjunctivae and EOM are normal. Pupils are equal, round, and reactive to light.   glasses   Neck: Normal range of motion. No thyromegaly present. Painful ROM   Pulmonary/Chest: Effort normal. No respiratory distress. Musculoskeletal: She exhibits tenderness (neck/lumbar). She exhibits no edema. Right knee: She exhibits decreased range of motion. Tenderness found. Left knee: Tenderness found. Cervical back: She exhibits decreased range of motion, tenderness, pain and spasm. Thoracic back: She exhibits tenderness, pain and spasm. Lumbar back: She exhibits decreased range of motion, tenderness, pain and spasm. Back:    Neurological: She is alert and oriented to person, place, and time. No cranial nerve deficit (grossly intact). Gait (antalgic) abnormal.   Psychiatric: She has a normal mood and affect. Her behavior is normal. Judgment and thought content normal.   Nursing note and vitals reviewed. ASSESSMENT and PLAN  Encounter Diagnoses   Name Primary?  Postlaminectomy syndrome, lumbar region Yes    Chronic pain syndrome     Persistent disorder of initiating or maintaining sleep     Brachial neuritis or radiculitis     Lumbar radiculopathy     Lumbosacral spondylosis without myelopathy     Encounter for long-term (current) use of other medications     Fibromyalgia     Spasm of muscle     Pain in joint, multiple sites      Treatment plan as noted above. She will continue on her current analgesic regimen as this is providing adequate pain control with improved functionality and minimal side effects. 3 month reassess her    No concerns are raised for misuse, abuse, or diversion.     1. Pain medications are prescribed with the objective of pain relief and improved physical and psychosocial function in this patient. 2. Counseled patient on proper use of prescribed medications and reviewed opioid contract. 3. Counseled patient about chronic medical conditions and their relationship to anxiety and depression and recommended mental health support as needed. 4. Reviewed with patient self-help tools, home exercise, and lifestyle changes to assist the patient in self-management of symptoms. 5. Advised patient to have a primary care provider to continue care for health maintenance and general medical conditions and support for referral to specialty care as needed. 6. Reviewed with patient the treatment plan, goals of treatment plan, and limitations of treatment plan, to include the potential for side effects from medications and procedures. If side effects occur, it is the responsibility of the patient to inform the clinic so that a change in the treatment plan can be made in a safe manner. The patient is advised that stopping prescribed medication may cause an increase in symptoms and possible medication withdrawal symptoms. The patient is informed an emergency room evaluation may be necessary if this occurs. DISPOSITION: The patients condition and plan were discussed at length and all questions were answered. The patient agrees with the plan.     Counseling occupied > 50% of visit:  Total time: 40 minutes

## 2017-07-24 ENCOUNTER — TELEPHONE (OUTPATIENT)
Dept: PAIN MANAGEMENT | Age: 57
End: 2017-07-24

## 2017-07-24 NOTE — TELEPHONE ENCOUNTER
07/24/2017 at 02:24 pm I called back Ms. Helton. She was inquiring about the SCS Trial and how to do in order to proceed. The patient explained that recently she got her care card. I explained to her that before the SCS Trial she will need to have a Psych Eval that is not covered by the Care Card. I told her that I believe that it will cost a visit to Dr. Tatyana Blackburn of approx. $250.00. The patient stated that she will not be able to pay $200.00 and on her next visit w/Cain she will discuss it.

## 2017-09-26 ENCOUNTER — TELEPHONE (OUTPATIENT)
Dept: PAIN MANAGEMENT | Age: 57
End: 2017-09-26

## 2017-09-26 NOTE — TELEPHONE ENCOUNTER
The pt was called to be made aware that she will need to reschedule her appt for tomorrow since the provider will not be in the office. She was not able to be reached. A message was left for the pt and the number to the nurse triage line was provided. The pt was asked to call the office back at her earliest convenience. A  was not able to be obtained because there is no provider access at this time.

## 2017-10-05 ENCOUNTER — OFFICE VISIT (OUTPATIENT)
Dept: PAIN MANAGEMENT | Age: 57
End: 2017-10-05

## 2017-10-05 VITALS
BODY MASS INDEX: 23.78 KG/M2 | WEIGHT: 130 LBS | TEMPERATURE: 98.7 F | HEART RATE: 75 BPM | SYSTOLIC BLOOD PRESSURE: 109 MMHG | RESPIRATION RATE: 20 BRPM | DIASTOLIC BLOOD PRESSURE: 54 MMHG

## 2017-10-05 DIAGNOSIS — G89.29 BILATERAL CHRONIC KNEE PAIN: ICD-10-CM

## 2017-10-05 DIAGNOSIS — M54.2 CERVICALGIA: ICD-10-CM

## 2017-10-05 DIAGNOSIS — M54.16 LUMBAR RADICULOPATHY: ICD-10-CM

## 2017-10-05 DIAGNOSIS — M25.50 PAIN IN JOINT, MULTIPLE SITES: ICD-10-CM

## 2017-10-05 DIAGNOSIS — Z86.59 HISTORY OF DEPRESSION: ICD-10-CM

## 2017-10-05 DIAGNOSIS — M47.816 SPONDYLOSIS OF LUMBAR REGION WITHOUT MYELOPATHY OR RADICULOPATHY: ICD-10-CM

## 2017-10-05 DIAGNOSIS — M25.562 BILATERAL CHRONIC KNEE PAIN: ICD-10-CM

## 2017-10-05 DIAGNOSIS — M96.1 POSTLAMINECTOMY SYNDROME, LUMBAR REGION: ICD-10-CM

## 2017-10-05 DIAGNOSIS — M51.36 LUMBAR DEGENERATIVE DISC DISEASE: ICD-10-CM

## 2017-10-05 DIAGNOSIS — M47.817 LUMBOSACRAL SPONDYLOSIS WITHOUT MYELOPATHY: ICD-10-CM

## 2017-10-05 DIAGNOSIS — M54.6 PAIN IN THORACIC SPINE: ICD-10-CM

## 2017-10-05 DIAGNOSIS — M22.40 CHONDROMALACIA OF PATELLA, UNSPECIFIED LATERALITY: ICD-10-CM

## 2017-10-05 DIAGNOSIS — G89.29 CHRONIC MIDLINE LOW BACK PAIN WITH SCIATICA, SCIATICA LATERALITY UNSPECIFIED: Primary | ICD-10-CM

## 2017-10-05 DIAGNOSIS — Z86.59 HISTORY OF ANXIETY: ICD-10-CM

## 2017-10-05 DIAGNOSIS — M25.561 BILATERAL CHRONIC KNEE PAIN: ICD-10-CM

## 2017-10-05 DIAGNOSIS — G89.4 CHRONIC PAIN SYNDROME: ICD-10-CM

## 2017-10-05 DIAGNOSIS — M54.40 CHRONIC MIDLINE LOW BACK PAIN WITH SCIATICA, SCIATICA LATERALITY UNSPECIFIED: Primary | ICD-10-CM

## 2017-10-05 RX ORDER — OXYCODONE HYDROCHLORIDE 30 MG/1
30 TABLET ORAL
Qty: 120 TAB | Refills: 0 | Status: SHIPPED | OUTPATIENT
Start: 2017-10-05 | End: 2017-10-05 | Stop reason: SDUPTHER

## 2017-10-05 RX ORDER — OXYCODONE HYDROCHLORIDE 30 MG/1
30 TABLET ORAL
Qty: 120 TAB | Refills: 0 | Status: SHIPPED | OUTPATIENT
Start: 2017-12-03 | End: 2018-01-02 | Stop reason: SDUPTHER

## 2017-10-05 RX ORDER — OXYCODONE HYDROCHLORIDE 30 MG/1
30 TABLET ORAL
Qty: 120 TAB | Refills: 0 | Status: SHIPPED | OUTPATIENT
Start: 2017-11-04 | End: 2017-10-05 | Stop reason: SDUPTHER

## 2017-10-05 NOTE — PROGRESS NOTES
Nursing Notes    Patient presents to the office today in follow-up. Patient rates her pain at 7/10 on the numerical pain scale. Reviewed medications with counts as follows:    Rx Date filled Qty Dispensed Pill Count Last Dose Short     Oxycodone 30mg 09/19/17 150 80 This am  No                                             Comments: Patient given educational material on opioids and chronic pain. POC UDS was not performed in office today    Any new labs or imaging since last appointment? NO    Have you been to an emergency room (ER) or urgent care clinic since your last visit? NO            Have you been hospitalized since your last visit? NO     If yes, where, when, and reason for visit? Have you seen or consulted any other health care providers outside of the [address] [place] [address]  since your last visit? NO     If yes, where, when, and reason for visit? pcp , ob/gyn , gastroenterology , psychiatrist       HM deferred to pcp.

## 2017-10-05 NOTE — PROGRESS NOTES
HISTORY OF PRESENT ILLNESS  Kathie Nicole is a 61 y.o. female. HPI Comments: Visit survey reviewed  Chief complaint low back pain, mid back pain, neck pain, pain to different joints  This is my first visit with the patient. She spoke to nursing, and administration today. I also had a lengthy discussion with the patient today. She is aware that her usual physician and physician assistant are no longer with this practice. I did review progress notes and discussed her history, discussed specifics of her pain, diagnoses and medications. She has tried non-opioid treatments in the past.  This clinic has been prescribing Roxicodone 30 mg, up to 5 a day as needed. She reports that about 15 days out of each month, she will only use 4 tablets. I have encouraged the patient to consider using less opioid pain medicine and she agrees. I am recommending this for many different reasons. I also discussed with the patient today that there are changes ongoing with pain management across the country and certainly within our own clinic. She has received additional educational material today on opioids and chronic pain. Prescription today-oxycodone 30 mg, 4 times a day as needed, #120. She has indicated, hopefully at her next visit, we can decrease the 30 mg tablets down to 20 mg. She is using Klonopin for anxiety and depression from psychiatry. Soma from her primary care physician. Topamax from neurology for tremors. Haldol and Zoloft from her psychiatrist.  She should of course keep close follow-up with psychiatry clinic and should discuss with them, seeing a therapist or psychologist to assist with chronic pain management from the mental health standpoint. Review of Systems   Constitutional: Negative for chills and fever. HENT: Negative for ear discharge and ear pain. Eyes: Negative for pain and discharge. Respiratory: Negative for hemoptysis and wheezing.     Cardiovascular: Negative for claudication and PND.   Gastrointestinal: Positive for constipation (at times). Negative for blood in stool. Has started taking Fiber Choice and eating dried prunes   Genitourinary: Negative for dysuria and hematuria. Musculoskeletal: Positive for back pain, joint pain, myalgias and neck pain. Skin: Negative for itching and rash. Neurological: Positive for tingling. Negative for seizures and loss of consciousness. Psychiatric/Behavioral: Positive for depression. Negative for suicidal ideas. The patient is nervous/anxious and has insomnia. All other systems reviewed and are negative. Physical Exam   Constitutional: She appears well-developed and well-nourished. She is cooperative. She does not have a sickly appearance. HENT:   Head: Normocephalic and atraumatic. Right Ear: External ear normal. No drainage. Left Ear: External ear normal. No drainage. Nose: Nose normal.   Eyes: Lids are normal. Right eye exhibits no discharge. Left eye exhibits no discharge. Right conjunctiva has no hemorrhage. Left conjunctiva has no hemorrhage. Neck: Neck supple. No tracheal deviation present. No thyroid mass present. Pulmonary/Chest: Effort normal. No respiratory distress. Neurological: She is alert. No cranial nerve deficit. Extremely slow gait. No indication of acute pain while sitting standing or walking   Skin: Skin is intact. No rash noted. Psychiatric: Her speech is normal. Her affect is not angry. She does not express inappropriate judgment. Nursing note and vitals reviewed. ASSESSMENT and PLAN  Encounter Diagnoses   Name Primary?     Chronic midline low back pain with sciatica, sciatica laterality unspecified Yes    Lumbosacral spondylosis without myelopathy     Pain in thoracic spine     Spondylosis of lumbar region without myelopathy or radiculopathy     Lumbar degenerative disc disease     Lumbar radiculopathy     Bilateral chronic knee pain     Chondromalacia of patella, unspecified laterality     Chronic pain syndrome     Pain in joint, multiple sites     Postlaminectomy syndrome, lumbar region     Cervicalgia    The majority of today's visit was spent counseling and coordinating care, total visit time 43 minutes  The patient refers to continue with follow-ups every 3 months  Medications, plans as reviewed above  We attempted to review prescription monitoring program [place], [place]. Not available online today. Pain is a complex sensory and emotional experience intimately related to the concept of suffering and the life experiences and psychological makeup of the individual. There is a host of psychosocial issues,including depression,anxiety,fear,altered social roles,and loss of activities which have a strong scientific basis for contributing to the chronic pain state. The effective treatment of chronic pain involves understanding the individual with pain. Issues of fear avoidance,catastrophizing,belief systems about exercise and injury,sleep disturbance,family dynamics,and other factors may play a role in the patient's experience of pain.

## 2017-10-05 NOTE — MR AVS SNAPSHOT
Visit Information Date & Time Provider Department Dept. Phone Encounter #  
 10/5/2017  1:40 PM Indiana Yanez MD [facility] [phone] [id number] Follow-up Instructions Return in about 3 months (around 1/5/2018). Your Appointments 1/2/2018  3:00 PM  
Office Visit with MD [facility] Appt Note: return in 3 months [address] [address]  
[phone] Jane Ortiz [phone] Upcoming Health Maintenance Date Due Hepatitis C Screening 1960 Pneumococcal 19-64 Medium Risk (1 of 1 - PPSV23) 7/11/1978 DTaP/Tdap/Td series (1 - Tdap) 7/11/1980 PAP AKA CERVICAL CYTOLOGY 7/11/1980 BREAST CANCER SCRN MAMMOGRAM 7/11/2009 FOBT Q 1 YEAR AGE 50-75 7/11/2009 INFLUENZA AGE 9 TO ADULT 8/1/2017 Allergies as of 10/5/2017  Review Complete On: 10/5/2017 By: Indiana Yanez MD  
  
 Severity Noted Reaction Type Reactions Dilaudid [Hydromorphone (Bulk)] High 01/31/2017    Palpitations  
 tachycardia Augmentin [Amoxicillin-pot Clavulanate] Medium 01/31/2017    Nausea and Vomiting Aspirin    Other (comments)  
 tinnitus Ceclor [Cefaclor]  01/31/2017    Other (comments) Patient not sure of reaction Codeine    Other (comments) GI  
  
Current Immunizations  Never Reviewed No immunizations on file. Not reviewed this visit You Were Diagnosed With   
  
 Codes Comments Chronic midline low back pain with sciatica, sciatica laterality unspecified    -  Primary ICD-10-CM: M54.40, G89.29 ICD-9-CM: 724.2, 724.3, 338.29 Lumbosacral spondylosis without myelopathy     ICD-10-CM: K00.232 ICD-9-CM: 721.3 Pain in thoracic spine     ICD-10-CM: M54.6 ICD-9-CM: 724.1 Spondylosis of lumbar region without myelopathy or radiculopathy     ICD-10-CM: M47.816 ICD-9-CM: 721.3 Lumbar degenerative disc disease     ICD-10-CM: M51.36 
ICD-9-CM: 722.52 Lumbar radiculopathy     ICD-10-CM: M54.16 
ICD-9-CM: 724.4 Bilateral chronic knee pain     ICD-10-CM: M25.561, M25.562, G89.29 ICD-9-CM: 719.46, 338.29 Chondromalacia of patella, unspecified laterality     ICD-10-CM: M22.40 ICD-9-CM: 717.7 Chronic pain syndrome     ICD-10-CM: G89.4 ICD-9-CM: 338.4 Pain in joint, multiple sites     ICD-10-CM: M25.50 ICD-9-CM: 719.49 Postlaminectomy syndrome, lumbar region     ICD-10-CM: M96.1 ICD-9-CM: 722.83 Cervicalgia     ICD-10-CM: M54.2 ICD-9-CM: 723.1 Vitals BP Pulse Temp Resp Weight(growth percentile) BMI  
 109/54 75 98.7 °F (37.1 °C) 20 130 lb (59 kg) 23.78 kg/m2 OB Status Smoking Status Hysterectomy Current Every Day Smoker Vitals History BMI and BSA Data Body Mass Index Body Surface Area 23.78 kg/m 2 1.61 m 2 Preferred Pharmacy Pharmacy Name Phone [facility] [address] [place] [phone] Your Updated Medication List  
  
   
This list is accurate as of: 10/5/17  2:24 PM.  Always use your most recent med list.  
  
  
  
  
 benzonatate 200 mg capsule Commonly known as:  TESSALON Take 200 mg by mouth three (3) times daily as needed for Cough. CRANBERRY PO Take 3,600 mg by mouth daily. dexamethasone 4 mg tablet Commonly known as:  DECADRON Taper daily as follows: 5-4-3-2-1  
  
 dicyclomine 20 mg tablet Commonly known as:  BENTYL Take 20 mg by mouth every six (6) hours. DOCUSOFT S 100 mg capsule Generic drug:  docusate sodium Take 100 mg by mouth two (2) times a day.  
  
 gabapentin 600 mg tablet Commonly known as:  NEURONTIN  
1 po bid x 14 days, then 1 po tid  Indications: NEUROPATHIC PAIN  
  
 haloperidol 0.5 mg tablet Commonly known as:  HALDOL Take 0.5 mg by mouth. hydroCHLOROthiazide 12.5 mg tablet Commonly known as:  HYDRODIURIL Take 1 Tab by mouth daily. KlonoPIN 1 mg tablet Generic drug:  clonazePAM  
Take 1 mg by mouth three (3) times daily. linaclotide 145 mcg Cap capsule Commonly known as:  Narciso Mable Take  by mouth Daily (before breakfast). mirtazapine 45 mg tablet Commonly known as:  Mina Zhou Take 45 mg by mouth nightly. OTHER Take 1 Tab by mouth daily. I cool tabs  
  
 oxyCODONE IR 30 mg immediate release tablet Commonly known as:  Melissa Delgado Take 1 Tab by mouth four (4) times daily as needed for Pain. Max Daily Amount: 120 mg.  
Start taking on:  12/3/2017 potassium chloride SA 10 mEq capsule Commonly known as:  Gregorio Trevizo Take 10 mEq by mouth two (2) times a day. solifenacin 10 mg tablet Commonly known as:  Grzegorz You Take 10 mg by mouth daily. SOMA 350 mg tablet Generic drug:  carisoprodol Take 350 mg by mouth four (4) times daily. STRESSTABS PO Take  by mouth daily. TOPAMAX 50 mg tablet Generic drug:  topiramate Take 50 mg by mouth Every Mon, Wed & Sun.  
  
 TOPROL XL 50 mg XL tablet Generic drug:  metoprolol succinate Take  by mouth daily. ZOLOFT 25 mg tablet Generic drug:  sertraline Take 100 mg by mouth two (2) times a day. Prescriptions Printed Refills  
 oxyCODONE IR (ROXICODONE) 30 mg immediate release tablet 0 Starting on: 12/3/2017 Sig: Take 1 Tab by mouth four (4) times daily as needed for Pain. Max Daily Amount: 120 mg.  
 Class: Print Route: Oral  
  
Follow-up Instructions Return in about 3 months (around 1/5/2018). Introducing \[facility]\"" & HEALTH SERVICES! Dear Keyonna Delcid: 
Thank you for requesting a [employer] account. Our records indicate that you already have an active [employer] account. You can access your account anytime at [email or web address] Did you know that you can access your hospital and ER discharge instructions at any time in First[employer]? You can also review all of your test results from your hospital stay or ER visit. Additional Information If you have questions, please visit the Frequently Asked Questions section of the First[employer] website at [email or web address] Remember, First30Days is NOT to be used for urgent needs. For medical emergencies, dial 911. Now available from your iPhone and Android! Please provide this summary of care documentation to your next provider. Your primary care clinician is listed as Michael Quinn. If you have any questions after today's visit, please call [phone].

## 2018-01-02 ENCOUNTER — OFFICE VISIT (OUTPATIENT)
Dept: PAIN MANAGEMENT | Age: 58
End: 2018-01-02

## 2018-01-02 VITALS
DIASTOLIC BLOOD PRESSURE: 66 MMHG | SYSTOLIC BLOOD PRESSURE: 130 MMHG | HEIGHT: 62 IN | RESPIRATION RATE: 12 BRPM | HEART RATE: 98 BPM | WEIGHT: 130 LBS | BODY MASS INDEX: 23.92 KG/M2 | TEMPERATURE: 99.7 F

## 2018-01-02 DIAGNOSIS — M51.36 LUMBAR DEGENERATIVE DISC DISEASE: ICD-10-CM

## 2018-01-02 DIAGNOSIS — Z79.899 ENCOUNTER FOR LONG-TERM (CURRENT) USE OF HIGH-RISK MEDICATION: ICD-10-CM

## 2018-01-02 DIAGNOSIS — Z86.59 HISTORY OF ANXIETY: ICD-10-CM

## 2018-01-02 DIAGNOSIS — Z86.59 HISTORY OF DEPRESSION: ICD-10-CM

## 2018-01-02 DIAGNOSIS — G89.4 CHRONIC PAIN SYNDROME: ICD-10-CM

## 2018-01-02 DIAGNOSIS — M25.50 PAIN IN JOINT, MULTIPLE SITES: ICD-10-CM

## 2018-01-02 DIAGNOSIS — M47.816 SPONDYLOSIS OF LUMBAR REGION WITHOUT MYELOPATHY OR RADICULOPATHY: ICD-10-CM

## 2018-01-02 DIAGNOSIS — M54.16 LUMBAR RADICULOPATHY: Primary | ICD-10-CM

## 2018-01-02 DIAGNOSIS — M96.1 POSTLAMINECTOMY SYNDROME, LUMBAR REGION: ICD-10-CM

## 2018-01-02 DIAGNOSIS — M54.2 CERVICALGIA: ICD-10-CM

## 2018-01-02 LAB
ALCOHOL UR POC: NORMAL
AMPHETAMINES UR POC: NORMAL
BARBITURATES UR POC: NORMAL
BENZODIAZEPINES UR POC: NORMAL
BUPRENORPHINE UR POC: NORMAL
CANNABINOIDS UR POC: NORMAL
CARISOPRODOL UR POC: NORMAL
COCAINE UR POC: NORMAL
FENTANYL UR POC: NORMAL
MDMA/ECSTASY UR POC: NORMAL
METHADONE UR POC: NORMAL
METHAMPHETAMINE UR POC: NORMAL
METHYLPHENIDATE UR POC: NORMAL
OPIATES UR POC: NORMAL
OXYCODONE UR POC: NORMAL
PHENCYCLIDINE UR POC: NORMAL
PROPOXYPHENE UR POC: NORMAL
TRAMADOL UR POC: NORMAL
TRICYCLICS UR POC: NORMAL

## 2018-01-02 RX ORDER — OXYCODONE HYDROCHLORIDE 30 MG/1
30 TABLET ORAL
Qty: 120 TAB | Refills: 0 | Status: SHIPPED | OUTPATIENT
Start: 2018-01-02 | End: 2018-04-02 | Stop reason: SDUPTHER

## 2018-01-02 RX ORDER — OXYCODONE HYDROCHLORIDE 30 MG/1
30 TABLET ORAL
Qty: 120 TAB | Refills: 0 | Status: SHIPPED | OUTPATIENT
Start: 2018-03-01 | End: 2018-04-02 | Stop reason: SDUPTHER

## 2018-01-02 RX ORDER — FLUOXETINE 20 MG/1
20 TABLET ORAL DAILY
COMMUNITY
End: 2019-07-29

## 2018-01-02 RX ORDER — OXYCODONE HYDROCHLORIDE 30 MG/1
30 TABLET ORAL
Qty: 120 TAB | Refills: 0 | Status: SHIPPED | OUTPATIENT
Start: 2018-02-01 | End: 2018-04-02 | Stop reason: SDUPTHER

## 2018-01-02 NOTE — MR AVS SNAPSHOT
Visit Information Date & Time Provider Department Dept. Phone Encounter #  
 1/2/2018  3:00 PM Erna Rdz MD [address] [place] [address] Pain Management [facility] [id number] Follow-up Instructions Return in about 3 months (around 4/2/2018). Upcoming Health Maintenance Date Due Hepatitis C Screening 1960 Pneumococcal 19-64 Medium Risk (1 of 1 - PPSV23) 7/11/1978 DTaP/Tdap/Td series (1 - Tdap) 7/11/1980 PAP AKA CERVICAL CYTOLOGY 7/11/1980 BREAST CANCER SCRN MAMMOGRAM 7/11/2009 FOBT Q 1 YEAR AGE 50-75 7/11/2009 Influenza Age 5 to Adult 8/1/2017 Allergies as of 1/2/2018  Review Complete On: 1/2/2018 By: Erna Rdz MD  
  
 Severity Noted Reaction Type Reactions Dilaudid [Hydromorphone (Bulk)] High 01/31/2017    Palpitations  
 tachycardia Augmentin [Amoxicillin-pot Clavulanate] Medium 01/31/2017    Nausea and Vomiting Aspirin    Other (comments)  
 tinnitus Ceclor [Cefaclor]  01/31/2017    Other (comments) Patient not sure of reaction Codeine    Other (comments) GI  
  
Current Immunizations  Never Reviewed No immunizations on file. Not reviewed this visit You Were Diagnosed With   
  
 Codes Comments Lumbar radiculopathy    -  Primary ICD-10-CM: M54.16 
ICD-9-CM: 724.4 History of depression     ICD-10-CM: Z86.59 
ICD-9-CM: V11.8 History of anxiety     ICD-10-CM: Z86.59 
ICD-9-CM: V11.8 Spondylosis of lumbar region without myelopathy or radiculopathy     ICD-10-CM: M47.816 ICD-9-CM: 721.3 Lumbar degenerative disc disease     ICD-10-CM: M51.36 
ICD-9-CM: 722.52 Chronic pain syndrome     ICD-10-CM: G89.4 ICD-9-CM: 338.4 Pain in joint, multiple sites     ICD-10-CM: M25.50 ICD-9-CM: 719.49 Postlaminectomy syndrome, lumbar region     ICD-10-CM: M96.1 ICD-9-CM: 722.83 Cervicalgia     ICD-10-CM: M54.2 ICD-9-CM: 723.1 Vitals BP Pulse Temp Resp Height(growth percentile) 130/66 (BP 1 Location: Right arm, BP Patient Position: Sitting) 98 99.7 °F (37.6 °C) (Axillary) 12 5' 2\" (1.575 m) Weight(growth percentile) BMI OB Status Smoking Status 130 lb (59 kg) 23.78 kg/m2 Hysterectomy Current Every Day Smoker Vitals History BMI and BSA Data Body Mass Index Body Surface Area 23.78 kg/m 2 1.61 m 2 Preferred Pharmacy Pharmacy Name Phone [facility] Ester Chan [address] [place] [address] [phone] Your Updated Medication List  
  
   
This list is accurate as of: 1/2/18  4:08 PM.  Always use your most recent med list.  
  
  
  
  
 benzonatate 200 mg capsule Commonly known as:  TESSALON Take 200 mg by mouth three (3) times daily as needed for Cough. CRANBERRY PO Take 3,600 mg by mouth daily. dicyclomine 20 mg tablet Commonly known as:  BENTYL Take 20 mg by mouth every six (6) hours. DOCUSOFT S 100 mg capsule Generic drug:  docusate sodium Take 100 mg by mouth two (2) times a day. FLUoxetine 20 mg tablet Commonly known as:  PROzac Take 20 mg by mouth daily. gabapentin 600 mg tablet Commonly known as:  NEURONTIN  
1 po bid x 14 days, then 1 po tid  Indications: NEUROPATHIC PAIN  
  
 haloperidol 0.5 mg tablet Commonly known as:  HALDOL Take 0.5 mg by mouth. hydroCHLOROthiazide 12.5 mg tablet Commonly known as:  HYDRODIURIL Take 1 Tab by mouth daily. KlonoPIN 1 mg tablet Generic drug:  clonazePAM  
Take 1 mg by mouth three (3) times daily. linaclotide 145 mcg Cap capsule Commonly known as:  Ardelia Roch Take  by mouth Daily (before breakfast). mirtazapine 45 mg tablet Commonly known as:  Doc Mane Take 45 mg by mouth nightly. OTHER Take 1 Tab by mouth daily. I cool tabs * oxyCODONE IR 30 mg immediate release tablet Commonly known as:  Bernard Montes  
 Take 1 Tab by mouth four (4) times daily as needed for Pain. Max Daily Amount: 120 mg.  
  
 * oxyCODONE IR 30 mg immediate release tablet Commonly known as:  Beth [place] Take 1 Tab by mouth four (4) times daily as needed for Pain. Max Daily Amount: 120 mg.  
Start taking on:  2/1/2018 * oxyCODONE IR 30 mg immediate release tablet Commonly known as:  Beth [place] Take 1 Tab by mouth four (4) times daily as needed for Pain. Max Daily Amount: 120 mg.  
Start taking on:  3/1/2018 potassium chloride SA 10 mEq capsule Commonly known as:  Louanna Sample Take 10 mEq by mouth two (2) times a day. solifenacin 10 mg tablet Commonly known as:  Kinnie Riding Take 10 mg by mouth daily. SOMA 350 mg tablet Generic drug:  carisoprodol Take 350 mg by mouth four (4) times daily. STRESSTABS PO Take  by mouth daily. TOPAMAX 50 mg tablet Generic drug:  topiramate Take 50 mg by mouth Every Mon, Wed & Sun.  
  
 TOPROL XL 50 mg XL tablet Generic drug:  metoprolol succinate Take  by mouth daily. ZOLOFT 25 mg tablet Generic drug:  sertraline Take 100 mg by mouth two (2) times a day. * Notice: This list has 3 medication(s) that are the same as other medications prescribed for you. Read the directions carefully, and ask your doctor or other care provider to review them with you. Prescriptions Printed Refills  
 oxyCODONE IR (ROXICODONE) 30 mg immediate release tablet 0 Sig: Take 1 Tab by mouth four (4) times daily as needed for Pain. Max Daily Amount: 120 mg.  
 Class: Print Route: Oral  
 oxyCODONE IR (ROXICODONE) 30 mg immediate release tablet 0 Starting on: 3/1/2018 Sig: Take 1 Tab by mouth four (4) times daily as needed for Pain. Max Daily Amount: 120 mg.  
 Class: Print Route: Oral  
 oxyCODONE IR (ROXICODONE) 30 mg immediate release tablet 0 Starting on: 2/1/2018 Sig: Take 1 Tab by mouth four (4) times daily as needed for Pain.  Max Daily Amount: 120 mg.  
 Class: Print Route: Oral  
  
Follow-up Instructions Return in about 3 months (around 4/2/2018). Patient Instructions Learning About Benefits From Quitting Smoking How does quitting smoking make you healthier? If you're thinking about quitting smoking, you may have a few reasons to be smoke-free. Your health may be one of them. · When you quit smoking, you lower your risks for cancer, lung disease, heart attack, stroke, blood vessel disease, and blindness from macular degeneration. · When you're smoke-free, you get sick less often, and you heal faster. You are less likely to get colds, flu, bronchitis, and pneumonia. · As a nonsmoker, you may find that your mood is better and you are less stressed. When and how will you feel healthier? Quitting has real health benefits that start from day 1 of being smoke-free. And the longer you stay smoke-free, the healthier you get and the better you feel. The first hours · After just 20 minutes, your blood pressure and heart rate go down. That means there's less stress on your heart and blood vessels. · Within 12 hours, the level of carbon monoxide in your blood drops back to normal. That makes room for more oxygen. With more oxygen in your body, you may notice that you have more energy than when you smoked. After 2 weeks · Your lungs start to work better. · Your risk of heart attack starts to drop. After 1 month · When your lungs are clear, you cough less and breathe deeper, so it's easier to be active. · Your sense of taste and smell return. That means you can enjoy food more than you have since you started smoking. Over the years · After 1 year, your risk of heart disease is half what it would be if you kept smoking. · After 5 years, your risk of stroke starts to shrink. Within a few years after that, it's about the same as if you'd never smoked. · After 10 years, your risk of dying from lung cancer is cut by about half. And your risk for many other types of cancer is lower too. How would quitting help others in your life? When you quit smoking, you improve the health of everyone who now breathes in your smoke. · Their heart, lung, and cancer risks drop, much like yours. · They are sick less. For babies and small children, living smoke-free means they're less likely to have ear infections, pneumonia, and bronchitis. · If you're a woman who is or will be pregnant someday, quitting smoking means a healthier . · Children who are close to you are less likely to become adult smokers. Where can you learn more? Go to http://saray-agustín.info/. Enter [id number] in the search box to learn more about \"Learning About Benefits From Quitting Smoking. \" Current as of: 2017 Content Version: 11.4 © 5327-0160 Tip or Skip. Care instructions adapted under license by [employer] (which disclaims liability or warranty for this information). If you have questions about a medical condition or this instruction, always ask your healthcare professional. [place] [address] any warranty or liability for your use of this information. Preventing Falls: Care Instructions Your Care Instructions Getting around your home safely can be a challenge if you have injuries or health problems that make it easy for you to fall. Loose rugs and furniture in walkways are among the dangers for many older people who have problems walking or who have poor eyesight. People who have conditions such as arthritis, osteoporosis, or dementia also have to be careful not to fall. You can make your home safer with a few simple measures. Follow-up care is a key part of your treatment and safety.  Be sure to make and go to all appointments, and call your doctor if you are having problems. It's also a good idea to know your test results and keep a list of the medicines you take. How can you care for yourself at home? Taking care of yourself · You may get dizzy if you do not drink enough water. To prevent dehydration, drink plenty of fluids, enough so that your urine is light yellow or clear like water. Choose water and other caffeine-free clear liquids. If you have kidney, heart, or liver disease and have to limit fluids, talk with your doctor before you increase the amount of fluids you drink. · Exercise regularly to improve your strength, muscle tone, and balance. Walk if you can. Swimming may be a good choice if you cannot walk easily. · Have your vision and hearing checked each year or any time you notice a change. If you have trouble seeing and hearing, you might not be able to avoid objects and could lose your balance. · Know the side effects of the medicines you take. Ask your doctor or pharmacist whether the medicines you take can affect your balance. Sleeping pills or sedatives can affect your balance. · Limit the amount of alcohol you drink. Alcohol can impair your balance and other senses. · Ask your doctor whether calluses or corns on your feet need to be removed. If you wear loose-fitting shoes because of calluses or corns, you can lose your balance and fall. · Talk to your doctor if you have numbness in your feet. Preventing falls at home · Remove raised doorway thresholds, throw rugs, and clutter. Repair loose carpet or raised areas in the floor. · Move furniture and electrical cords to keep them out of walking paths. · Use nonskid floor wax, and wipe up spills right away, especially on ceramic tile floors. · If you use a walker or cane, put rubber tips on it. If you use crutches, clean the bottoms of them regularly with an abrasive pad, such as steel wool.  
· Keep your house well lit, especially Jannine Gutter, and outside walkways. Use night-lights in areas such as hallways and bathrooms. Add extra light switches or use remote switches (such as switches that go on or off when you clap your hands) to make it easier to turn lights on if you have to get up during the night. · Install sturdy handrails on stairways. · Move items in your cabinets so that the things you use a lot are on the lower shelves (about waist level). · Keep a cordless phone and a flashlight with new batteries by your bed. If possible, put a phone in each of the main rooms of your house, or carry a cell phone in case you fall and cannot reach a phone. Or, you can wear a device around your neck or wrist. You push a button that sends a signal for help. · Wear low-heeled shoes that fit well and give your feet good support. Use footwear with nonskid soles. Check the heels and soles of your shoes for wear. Repair or replace worn heels or soles. · Do not wear socks without shoes on wood floors. · Walk on the grass when the sidewalks are slippery. If you live in an area that gets snow and ice in the winter, sprinkle salt on slippery steps and sidewalks. Preventing falls in the bath · Install grab bars and nonskid mats inside and outside your shower or tub and near the toilet and sinks. · Use shower chairs and bath benches. · Use a hand-held shower head that will allow you to sit while showering. · Get into a tub or shower by putting the weaker leg in first. Get out of a tub or shower with your strong side first. 
· Repair loose toilet seats and consider installing a raised toilet seat to make getting on and off the toilet easier. · Keep your bathroom door unlocked while you are in the shower. Where can you learn more? Go to http://saray-agustín.info/. Enter [id number] [id number] in the search box to learn more about \"Preventing Falls: Care Instructions. \" Current as of: May 12, 2017 Content Version: 11.4 © 4095-5529 Healthwise, Incorporated. Care instructions adapted under license by [employer] (which disclaims liability or warranty for this information). If you have questions about a medical condition or this instruction, always ask your healthcare professional. [address] any warranty or liability for your use of this information. Introducing [facility]! Dear Uday Canas: 
Thank you for requesting a EnOcean account. Our records indicate that you already have an active EnOcean account. You can access your account anytime at [email or web address] Did you know that you can access your hospital and ER discharge instructions at any time in EnOcean? You can also review all of your test results from your hospital stay or ER visit. Additional Information If you have questions, please visit the Frequently Asked Questions section of the EnOcean website at [email or web address] Remember, EnOcean is NOT to be used for urgent needs. For medical emergencies, dial 911. Now available from your iPhone and Android! Please provide this summary of care documentation to your next provider. Your primary care clinician is listed as Amy Abad. If you have any questions after today's visit, please call [phone].

## 2018-01-02 NOTE — PATIENT INSTRUCTIONS
Learning About Benefits From Quitting Smoking  How does quitting smoking make you healthier? If you're thinking about quitting smoking, you may have a few reasons to be smoke-free. Your health may be one of them. · When you quit smoking, you lower your risks for cancer, lung disease, heart attack, stroke, blood vessel disease, and blindness from macular degeneration. · When you're smoke-free, you get sick less often, and you heal faster. You are less likely to get colds, flu, bronchitis, and pneumonia. · As a nonsmoker, you may find that your mood is better and you are less stressed. When and how will you feel healthier? Quitting has real health benefits that start from day 1 of being smoke-free. And the longer you stay smoke-free, the healthier you get and the better you feel. The first hours  · After just 20 minutes, your blood pressure and heart rate go down. That means there's less stress on your heart and blood vessels. · Within 12 hours, the level of carbon monoxide in your blood drops back to normal. That makes room for more oxygen. With more oxygen in your body, you may notice that you have more energy than when you smoked. After 2 weeks  · Your lungs start to work better. · Your risk of heart attack starts to drop. After 1 month  · When your lungs are clear, you cough less and breathe deeper, so it's easier to be active. · Your sense of taste and smell return. That means you can enjoy food more than you have since you started smoking. Over the years  · After 1 year, your risk of heart disease is half what it would be if you kept smoking. · After 5 years, your risk of stroke starts to shrink. Within a few years after that, it's about the same as if you'd never smoked. · After 10 years, your risk of dying from lung cancer is cut by about half. And your risk for many other types of cancer is lower too. How would quitting help others in your life?   When you quit smoking, you improve the health of everyone who now breathes in your smoke. · Their heart, lung, and cancer risks drop, much like yours. · They are sick less. For babies and small children, living smoke-free means they're less likely to have ear infections, pneumonia, and bronchitis. · If you're a woman who is or will be pregnant someday, quitting smoking means a healthier . · Children who are close to you are less likely to become adult smokers. Where can you learn more? Go to http://saray-agustín.info/. Enter [id number] in the search box to learn more about \"Learning About Benefits From Quitting Smoking. \"  Current as of: 2017  Content Version: 11.4  © 3303-0902 [employer]. Care instructions adapted under license by [employer] (which disclaims liability or warranty for this information). If you have questions about a medical condition or this instruction, always ask your healthcare professional. [place] [address] any warranty or liability for your use of this information. Preventing Falls: Care Instructions  Your Care Instructions    Getting around your home safely can be a challenge if you have injuries or health problems that make it easy for you to fall. Loose rugs and furniture in walkways are among the dangers for many older people who have problems walking or who have poor eyesight. People who have conditions such as arthritis, osteoporosis, or dementia also have to be careful not to fall. You can make your home safer with a few simple measures. Follow-up care is a key part of your treatment and safety. Be sure to make and go to all appointments, and call your doctor if you are having problems. It's also a good idea to know your test results and keep a list of the medicines you take. How can you care for yourself at home? Taking care of yourself  · You may get dizzy if you do not drink enough water.  To prevent dehydration, drink plenty of fluids, enough so that your urine is light yellow or clear like water. Choose water and other caffeine-free clear liquids. If you have kidney, heart, or liver disease and have to limit fluids, talk with your doctor before you increase the amount of fluids you drink. · Exercise regularly to improve your strength, muscle tone, and balance. Walk if you can. Swimming may be a good choice if you cannot walk easily. · Have your vision and hearing checked each year or any time you notice a change. If you have trouble seeing and hearing, you might not be able to avoid objects and could lose your balance. · Know the side effects of the medicines you take. Ask your doctor or pharmacist whether the medicines you take can affect your balance. Sleeping pills or sedatives can affect your balance. · Limit the amount of alcohol you drink. Alcohol can impair your balance and other senses. · Ask your doctor whether calluses or corns on your feet need to be removed. If you wear loose-fitting shoes because of calluses or corns, you can lose your balance and fall. · Talk to your doctor if you have numbness in your feet. Preventing falls at home  · Remove raised doorway thresholds, throw rugs, and clutter. Repair loose carpet or raised areas in the floor. · Move furniture and electrical cords to keep them out of walking paths. · Use nonskid floor wax, and wipe up spills right away, especially on ceramic tile floors. · If you use a walker or cane, put rubber tips on it. If you use crutches, clean the bottoms of them regularly with an abrasive pad, such as steel wool. · Keep your house well lit, especially Ky Diones, and outside walkways. Use night-lights in areas such as hallways and bathrooms. Add extra light switches or use remote switches (such as switches that go on or off when you clap your hands) to make it easier to turn lights on if you have to get up during the night. · Install sturdy handrails on stairways.   · Move items in your cabinets so that the things you use a lot are on the lower shelves (about waist level). · Keep a cordless phone and a flashlight with new batteries by your bed. If possible, put a phone in each of the main rooms of your house, or carry a cell phone in case you fall and cannot reach a phone. Or, you can wear a device around your neck or wrist. You push a button that sends a signal for help. · Wear low-heeled shoes that fit well and give your feet good support. Use footwear with nonskid soles. Check the heels and soles of your shoes for wear. Repair or replace worn heels or soles. · Do not wear socks without shoes on wood floors. · Walk on the grass when the sidewalks are slippery. If you live in an area that gets snow and ice in the winter, sprinkle salt on slippery steps and sidewalks. Preventing falls in the bath  · Install grab bars and nonskid mats inside and outside your shower or tub and near the toilet and sinks. · Use shower chairs and bath benches. · Use a hand-held shower head that will allow you to sit while showering. · Get into a tub or shower by putting the weaker leg in first. Get out of a tub or shower with your strong side first.  · Repair loose toilet seats and consider installing a raised toilet seat to make getting on and off the toilet easier. · Keep your bathroom door unlocked while you are in the shower. Where can you learn more? Go to http://saray-agustín.info/. Enter [id number] [id number] in the search box to learn more about \"Preventing Falls: Care Instructions. \"  Current as of: May 12, 2017  Content Version: 11.4  © 0841-4812 Healthwise, Incorporated. Care instructions adapted under license by [employer] (which disclaims liability or warranty for this information).  If you have questions about a medical condition or this instruction, always ask your healthcare professional. [place] [address] any warranty or liability for your use of this information.

## 2018-01-02 NOTE — PROGRESS NOTES
HISTORY OF PRESENT ILLNESS  Kathie Villarreal is a 61 y.o. female. HPI Comments: Visit survey reviewed  Chief complaint chronic pain syndrome low back pain with symptoms in the left leg. Pain in multiple joints  The patient will continue with Roxicodone 30 mg 4 times a day as needed  Soma, clonazepam, gabapentin prescribed by other clinics  -The patient indicates, the pain medication continues to be helpful/beneficial.  The patient will continue medications. No new significant side effects reported  Medication continues to help improve quality of life. Medications reviewed including risk and benefits. Recent average level of pain8    Measurement of clinical outcome for chronic pain patient/ SPAASMS Score Card-            Information reviewed and will be scanned. Total score today-5    Back Pain    Associated symptoms include tingling. Pertinent negatives include no fever and no dysuria. Neck Pain     Knee Pain     Hand Pain    Associated symptoms include tingling, back pain and neck pain. Pertinent negatives include no itching. Foot Pain         Review of Systems   Constitutional: Negative for chills and fever. HENT: Negative for ear discharge and ear pain. Eyes: Negative for pain and discharge. Respiratory: Negative for hemoptysis and wheezing. Cardiovascular: Negative for claudication and PND. Gastrointestinal: Positive for constipation (at times). Negative for blood in stool. Has started taking Fiber Choice and eating dried prunes   Genitourinary: Negative for dysuria and hematuria. Musculoskeletal: Positive for back pain, joint pain, myalgias and neck pain. Skin: Negative for itching and rash. Neurological: Positive for tingling. Negative for seizures and loss of consciousness. Psychiatric/Behavioral: Positive for depression. Negative for suicidal ideas. The patient is nervous/anxious and has insomnia. All other systems reviewed and are negative.       Physical Exam   Constitutional: She appears well-developed and well-nourished. She is cooperative. She does not have a sickly appearance. HENT:   Head: Normocephalic and atraumatic. Right Ear: External ear normal. No drainage. Left Ear: External ear normal. No drainage. Nose: Nose normal.   Eyes: Lids are normal. Right eye exhibits no discharge. Left eye exhibits no discharge. Right conjunctiva has no hemorrhage. Left conjunctiva has no hemorrhage. Neck: Neck supple. No tracheal deviation present. No thyroid mass present. Pulmonary/Chest: Effort normal. No respiratory distress. Neurological: She is alert. No cranial nerve deficit. Extremely slow gait. Skin: Skin is intact. No rash noted. Psychiatric: Her speech is normal. Her affect is not angry. She does not express inappropriate judgment. Nursing note and vitals reviewed. ASSESSMENT and PLAN  Encounter Diagnoses   Name Primary?  Lumbar radiculopathy Yes    History of depression     History of anxiety     Spondylosis of lumbar region without myelopathy or radiculopathy     Lumbar degenerative disc disease     Chronic pain syndrome     Pain in joint, multiple sites     Postlaminectomy syndrome, lumbar region     Cervicalgia    No indicators for recent medication misuse.  reviewed. Pain Meds and Quality Of Life have been reviewed. Nonpharmacologic therapy and non-opioid pharmacologic therapy were considered. If opioid therapy is prescribed, this is only if the expected benefits are anticipated to outweigh risks. Possible changes to treatment plan considered. Support/education given as needed. Today-medications are as listed. No significant changes to medications. Follow up -- 3 months.  --Urine test or oral swab today. Also, the prescription monitoring program was reviewed today. The majority of today's visit was spent counseling and coordinating care. Total visit time-40 minutes.   -Dragon medical dictation software was used for portions of this report. Unintended errors may occur.

## 2018-01-02 NOTE — PROGRESS NOTES
Nursing Notes    Patient presents to the office today in follow-up. Patient rates her pain at 7/10 on the numerical pain scale. Reviewed medications with counts as follows:    Rx Date filled Qty Dispensed Pill Count Last Dose Short   Oxycodone 30 mg 12/04/17 120 7 today no                                     POC UDS was performed in office today    Any new labs or imaging since last appointment? NO    Have you been to an emergency room (ER) or urgent care clinic since your last visit? NO            Have you been hospitalized since your last visit? NO     If yes, where, when, and reason for visit? Have you seen or consulted any other health care providers outside of the [address] [place] [address]  since your last visit? YES     If yes, where, when, and reason for visit? pcp    HM deferred to pcp.

## 2018-04-02 ENCOUNTER — OFFICE VISIT (OUTPATIENT)
Dept: PAIN MANAGEMENT | Age: 58
End: 2018-04-02

## 2018-04-02 VITALS
HEART RATE: 76 BPM | RESPIRATION RATE: 14 BRPM | HEIGHT: 62 IN | TEMPERATURE: 97.9 F | DIASTOLIC BLOOD PRESSURE: 64 MMHG | WEIGHT: 130 LBS | BODY MASS INDEX: 23.92 KG/M2 | SYSTOLIC BLOOD PRESSURE: 95 MMHG

## 2018-04-02 DIAGNOSIS — M79.7 FIBROMYALGIA: ICD-10-CM

## 2018-04-02 DIAGNOSIS — M96.1 POSTLAMINECTOMY SYNDROME, LUMBAR REGION: ICD-10-CM

## 2018-04-02 DIAGNOSIS — M54.2 CERVICALGIA: ICD-10-CM

## 2018-04-02 DIAGNOSIS — G89.29 BILATERAL CHRONIC KNEE PAIN: ICD-10-CM

## 2018-04-02 DIAGNOSIS — M54.12 BRACHIAL NEURITIS OR RADICULITIS: ICD-10-CM

## 2018-04-02 DIAGNOSIS — M54.16 LUMBAR RADICULOPATHY: ICD-10-CM

## 2018-04-02 DIAGNOSIS — M25.561 BILATERAL CHRONIC KNEE PAIN: ICD-10-CM

## 2018-04-02 DIAGNOSIS — M47.816 SPONDYLOSIS OF LUMBAR REGION WITHOUT MYELOPATHY OR RADICULOPATHY: ICD-10-CM

## 2018-04-02 DIAGNOSIS — M51.36 LUMBAR DEGENERATIVE DISC DISEASE: ICD-10-CM

## 2018-04-02 DIAGNOSIS — M25.562 BILATERAL CHRONIC KNEE PAIN: ICD-10-CM

## 2018-04-02 RX ORDER — OXYCODONE HYDROCHLORIDE 20 MG/1
20 TABLET ORAL
Qty: 120 TAB | Refills: 0 | Status: SHIPPED | OUTPATIENT
Start: 2018-05-30 | End: 2018-07-02 | Stop reason: SDUPTHER

## 2018-04-02 RX ORDER — OXYCODONE HYDROCHLORIDE 20 MG/1
20 TABLET ORAL
Qty: 120 TAB | Refills: 0 | Status: SHIPPED | OUTPATIENT
Start: 2018-05-01 | End: 2018-07-02 | Stop reason: SDUPTHER

## 2018-04-02 RX ORDER — OXYCODONE HYDROCHLORIDE 20 MG/1
20 TABLET ORAL
Qty: 120 TAB | Refills: 0 | Status: SHIPPED | OUTPATIENT
Start: 2018-04-02 | End: 2018-07-02 | Stop reason: SDUPTHER

## 2018-04-02 NOTE — PROGRESS NOTES
HISTORY OF PRESENT ILLNESS  Kathie Ho is a 61 y.o. female    HPI: Ms. Shamir Ho  returns today for f/u of  chronic, severe pain involving the lumbar spine with radiation down the posterior left greater than right lower extremities to the mid calf associated with numbness and tingling. She suffers from possible rheumatoid arthritis as well as a post lumbar laminectomy pain syndrome and cervical and lumbar degenerative disc disease. Today is my first visit with Ms. Helton. She continues with pain unchanged since last visit. We discussed her current condition and medications in detail today. She has been doing well with her current treatment plan but has not been offering much pain control. Unfortunately her normal providers, Dr. Monster Barclay and Jaquelin Juarez PA-C are no longer with our practice. At this time she would like to remain a patient with our practice. She is currently using oxycodone 30 mg 4 times daily. We had a lengthy conversation regarding long-acting versus short acting medication. I have recommended small changes to her regimen over long period of time. I would like to taper her oxycodone 30 mg down to 20 mg on an as-needed basis only. She would then work on reducing this to half to 1 tablet up to 4 times daily. She is interested in adding long-acting medication soon and this will be discussed again next visit. I will have her follow-up in 3 months or sooner if needed. Current medication management includes oxycodone 30 mg 4 times daily as needed. Klonopin and Soma from an outside provider. She also receives Topamax from her neurologist.  Haldol and Zoloft from her psychiatrist.  Medications are helping with pain control and quality of life. Her pain is 7-8/10 with medication and 9/10 without. Pt describes pain as sharp. Aggravating factors include standing, bending, and walking. Relieved with rest, medication, and avoiding painful activities.  Current treatment is helping to improve general activity, mood, sleep, and enjoyment of life    Ms. Helton is tolerating medications well, with no side effects noted. She is able to stay more active with less discomfort with these current doses. The patient reports an average of 20% pain relief with current treatment/medications. She is informed of side effects, risks, and benefits of this regimen, and emphasizes that she derives a significant improvement in functionality and quality of life, and notes that non-opioid medications and therapies in the past have not offered significant benefit. She  is otherwise doing well with no other complaints today. She denies any adverse events including nausea, vomiting, dizziness, increased constipation, hallucinations, or seizures. Because the patient's current regimen places him/her at increased risk for possible overdose, a prescription for naloxone nasal spray has been provided. The patient understands that this medication is only to be used in the setting of a possible overdose and that inadvertent use of this medication could precipitate overt withdrawal.         Allergies   Allergen Reactions    Dilaudid [Hydromorphone (Bulk)] Palpitations     tachycardia    Augmentin [Amoxicillin-Pot Clavulanate] Nausea and Vomiting    Aspirin Other (comments)     tinnitus    Ceclor [Cefaclor] Other (comments)     Patient not sure of reaction    Codeine Other (comments)     GI       Past Surgical History:   Procedure Laterality Date    HX APPENDECTOMY      HX GYN  1989    partial hysterectomy     HX ORTHOPAEDIC  2007    lumbar fusion     HX TONSILLECTOMY           Review of Systems   Constitutional: Negative for chills, fever and weight loss. HENT: Negative for congestion and sore throat. Eyes: Negative for blurred vision and double vision. Respiratory: Negative for cough, shortness of breath and wheezing. Cardiovascular: Positive for palpitations. Negative for chest pain.    Gastrointestinal: Negative for abdominal pain, constipation, diarrhea, heartburn, nausea and vomiting. Genitourinary: Negative. Musculoskeletal: Positive for back pain and neck pain. Neurological: Negative for dizziness, seizures and loss of consciousness. Endo/Heme/Allergies: Does not bruise/bleed easily. Psychiatric/Behavioral: Positive for depression. The patient is nervous/anxious. The patient does not have insomnia. Physical Exam   Constitutional: She is oriented to person, place, and time and well-developed, well-nourished, and in no distress. No distress. HENT:   Head: Normocephalic and atraumatic. Eyes: EOM are normal.   Pulmonary/Chest: Effort normal.   Neurological: She is alert and oriented to person, place, and time. Skin: Skin is warm and dry. No rash noted. She is not diaphoretic. No erythema. Psychiatric: Mood, memory, affect and judgment normal.   Nursing note and vitals reviewed. ASSESSMENT:    1. Lumbar radiculopathy    2. Postlaminectomy syndrome, lumbar region    3. Cervicalgia    4. Fibromyalgia    5. Brachial neuritis or radiculitis    6. Bilateral chronic knee pain    7. Spondylosis of lumbar region without myelopathy or radiculopathy    8. Lumbar degenerative disc disease           [place] Prescription Monitoring Program was reviewed which does not demonstrate aberrancies and/or inconsistencies with regard to the historical prescribing of controlled medications to this patient by other providers. PLAN / Pt Instructions:  1. Continue current plan with no evidence of addiction or diversion. Stable on current medication without adverse events. 2. Refill and adjust oxycodone 30 mg down to 20 mg 4 times daily as needed. Please work on reducing this down to half to 1 tablet on an as-needed basis only. We will discuss long-acting medication further next visit. 3. Naloxone 4 mg nasal spray for opioid induced respiratory depression emergency only.     4. Discussed risks of addiction, dependency, and opioid induced hyperalgesia. 5. Return to clinic in 3 months      Medications Ordered Today   Medications    oxyCODONE IR (ROXICODONE) 20 mg immediate release tablet     Sig: Take 1 Tab by mouth four (4) times daily as needed for Pain for up to 30 days. Max Daily Amount: 80 mg. Dispense:  120 Tab     Refill:  0    oxyCODONE IR (ROXICODONE) 20 mg immediate release tablet     Sig: Take 1 Tab by mouth four (4) times daily as needed for Pain for up to 30 days. Max Daily Amount: 80 mg. Dispense:  120 Tab     Refill:  0    oxyCODONE IR (ROXICODONE) 20 mg immediate release tablet     Sig: Take 1 Tab by mouth four (4) times daily as needed for Pain for up to 30 days. Max Daily Amount: 80 mg. Dispense:  120 Tab     Refill:  0         DISPOSITION     Pain medications are prescribed with the objective of pain relief and improved physical and psychosocial function in this patient.  Pain Meds and Quality Of Life have been reviewed. Nonpharmacologic therapy and non-opioid pharmacologic therapy have been considered. Opioid therapy is only prescribed if the expected benefits are anticipated to outweigh risks.  Counseled patient on proper use of prescribed medications.  Reviewed with patient self-help tools, home exercise, and lifestyle changes to assist the patient in self-management of symptoms.  Reviewed with patient the treatment plan, goals of treatment plan, and limitations of treatment plan, to include the potential for side effects from medications and procedures. If side effects occur, it is the responsibility of the patient to inform the clinic so that a change in the treatment plan can be made in a safe manner. The patient is advised that stopping prescribed medication may cause an increase in symptoms and possible medication withdrawal symptoms. The patient is informed an emergency room evaluation may be necessary if this occurs.       Spent 25 minutes with patient today which more than 50% of that time was spent on counseling and coordination of care. Axel Nuñez 4/2/2018        Note: Please excuse any typographical errors. Voice recognition software was used for this note and may cause mistakes.

## 2018-04-02 NOTE — PROGRESS NOTES
Nursing Notes    Patient presents to the office today in follow-up. Patient rates her pain at 7/10 on the numerical pain scale. Reviewed medications with counts as follows:    Rx Date filled Qty Dispensed Pill Count Last Dose Short   Oxycodone 30 mg 03/02/18 120 7 today no         Comments: Patient is here today for a follow up appt today she states her pain level today is a 7  She states labs were taken at [facility] in [facility] for neuro MD    POC UDS was not performed in office today    Any new labs or imaging since last appointment? Yes labs taken at [facility]    Have you been to an emergency room (ER) or urgent care clinic since your last visit? NO            Have you been hospitalized since your last visit? NO     If yes, where, when, and reason for visit? Have you seen or consulted any other health care providers outside of the [address] [place] [address]  since your last visit? YES Neuro      If yes, where, when, and reason for visit? HM deferred to pcp.

## 2018-04-02 NOTE — PATIENT INSTRUCTIONS
1. Continue current plan with no evidence of addiction or diversion. Stable on current medication without adverse events. 2. Refill and adjust oxycodone 30 mg down to 20 mg 4 times daily as needed. Please work on reducing this down to half to 1 tablet on an as-needed basis only. We will discuss long-acting medication further next visit. 3. Naloxone 4 mg nasal spray for opioid induced respiratory depression emergency only. 4. Discussed risks of addiction, dependency, and opioid induced hyperalgesia.    5. Return to clinic in 3 months

## 2018-04-02 NOTE — MR AVS SNAPSHOT
[address] [address] 
[phone] Patient: Michell Delgadillo MRN: [id number] [facility]:[birth year] Visit Information Date & Time Provider Department Dept. Phone Encounter #  
 4/2/2018  2:20 PM KACIE Bañuelos [address] for Pain Management [phone] [id number] Follow-up Instructions Return in about 3 months (around 7/2/2018). Upcoming Health Maintenance Date Due Hepatitis C Screening 1960 Pneumococcal 19-64 Medium Risk (1 of 1 - PPSV23) 7/11/1978 DTaP/Tdap/Td series (1 - Tdap) 7/11/1980 PAP AKA CERVICAL CYTOLOGY 7/11/1980 BREAST CANCER SCRN MAMMOGRAM 7/11/2009 FOBT Q 1 YEAR AGE 50-75 7/11/2009 Influenza Age 5 to Adult 8/1/2017 Allergies as of 4/2/2018  Review Complete On: 4/2/2018 By: KACIE Bañuelos Severity Noted Reaction Type Reactions Dilaudid [Hydromorphone (Bulk)] High 01/31/2017    Palpitations  
 tachycardia Augmentin [Amoxicillin-pot Clavulanate] Medium 01/31/2017    Nausea and Vomiting Aspirin    Other (comments)  
 tinnitus Ceclor [Cefaclor]  01/31/2017    Other (comments) Patient not sure of reaction Codeine    Other (comments) GI  
  
Current Immunizations  Never Reviewed No immunizations on file. Not reviewed this visit You Were Diagnosed With   
  
 Codes Comments Lumbar radiculopathy     ICD-10-CM: M54.16 
ICD-9-CM: 724.4 Postlaminectomy syndrome, lumbar region     ICD-10-CM: M96.1 ICD-9-CM: 722.83 Cervicalgia     ICD-10-CM: M54.2 ICD-9-CM: 723.1 Fibromyalgia     ICD-10-CM: M79.7 ICD-9-CM: 729.1 Brachial neuritis or radiculitis     ICD-10-CM: M54.12 
ICD-9-CM: 723.4 Bilateral chronic knee pain     ICD-10-CM: M25.561, M25.562, G89.29 ICD-9-CM: 719.46, 338.29 Spondylosis of lumbar region without myelopathy or radiculopathy     ICD-10-CM: M47.816 ICD-9-CM: 721.3 Lumbar degenerative disc disease     ICD-10-CM: M51.36 
ICD-9-CM: 722.52 Vitals BP Pulse Temp Resp Height(growth percentile) Weight(growth percentile) 95/64 (BP 1 Location: Right arm, BP Patient Position: Sitting) 76 97.9 °F (36.6 °C) 14 5' 2\" (1.575 m) 130 lb (59 kg) BMI OB Status Smoking Status 23.78 kg/m2 Hysterectomy Current Every Day Smoker BMI and BSA Data Body Mass Index Body Surface Area 23.78 kg/m 2 1.61 m 2 Preferred Pharmacy Pharmacy Name Phone [facility] Ester Chan [address] [place], [place] [phone] Your Updated Medication List  
  
   
This list is accurate as of 4/2/18  2:52 PM.  Always use your most recent med list.  
  
  
  
  
 benzonatate 200 mg capsule Commonly known as:  TESSALON Take 200 mg by mouth three (3) times daily as needed for Cough. CRANBERRY PO Take 3,600 mg by mouth daily. dicyclomine 20 mg tablet Commonly known as:  BENTYL Take 20 mg by mouth every six (6) hours. DOCUSOFT S 100 mg capsule Generic drug:  docusate sodium Take 100 mg by mouth two (2) times a day. FLUoxetine 20 mg tablet Commonly known as:  PROzac Take 20 mg by mouth daily. gabapentin 600 mg tablet Commonly known as:  NEURONTIN  
1 po bid x 14 days, then 1 po tid  Indications: NEUROPATHIC PAIN  
  
 haloperidol 0.5 mg tablet Commonly known as:  HALDOL Take 0.5 mg by mouth. hydroCHLOROthiazide 12.5 mg tablet Commonly known as:  HYDRODIURIL Take 1 Tab by mouth daily. KlonoPIN 1 mg tablet Generic drug:  clonazePAM  
Take 1 mg by mouth three (3) times daily. linaclotide 145 mcg Cap capsule Commonly known as:  Kelley Shadi Take  by mouth Daily (before breakfast). mirtazapine 45 mg tablet Commonly known as:  Tommy Font Take 45 mg by mouth nightly. OTHER Take 1 Tab by mouth daily. I cool tabs * oxyCODONE IR 20 mg immediate release tablet Commonly known as:  Abdifatah Irizarry Take 1 Tab by mouth four (4) times daily as needed for Pain for up to 30 days. Max Daily Amount: 80 mg.  
  
 * oxyCODONE IR 20 mg immediate release tablet Commonly known as:  Abdifatahniru Irizarry Take 1 Tab by mouth four (4) times daily as needed for Pain for up to 30 days. Max Daily Amount: 80 mg. Start taking on:  5/1/2018 * oxyCODONE IR 20 mg immediate release tablet Commonly known as:  Abdifatah Irizarry Take 1 Tab by mouth four (4) times daily as needed for Pain for up to 30 days. Max Daily Amount: 80 mg. Start taking on:  5/30/2018 potassium chloride SA 10 mEq capsule Commonly known as:  Lavinia Fiddler Take 10 mEq by mouth two (2) times a day. solifenacin 10 mg tablet Commonly known as:  Roselee Osmani Take 10 mg by mouth daily. SOMA 350 mg tablet Generic drug:  carisoprodol Take 350 mg by mouth four (4) times daily. STRESSTABS PO Take  by mouth daily. TOPAMAX 50 mg tablet Generic drug:  topiramate Take 50 mg by mouth Every Mon, Wed & Sun.  
  
 TOPROL XL 50 mg XL tablet Generic drug:  metoprolol succinate Take  by mouth daily. ZOLOFT 25 mg tablet Generic drug:  sertraline Take 100 mg by mouth two (2) times a day. * Notice: This list has 3 medication(s) that are the same as other medications prescribed for you. Read the directions carefully, and ask your doctor or other care provider to review them with you. Prescriptions Printed Refills  
 oxyCODONE IR (ROXICODONE) 20 mg immediate release tablet 0 Sig: Take 1 Tab by mouth four (4) times daily as needed for Pain for up to 30 days. Max Daily Amount: 80 mg.  
 Class: Print Route: Oral  
 oxyCODONE IR (ROXICODONE) 20 mg immediate release tablet 0 Starting on: 5/1/2018 Sig: Take 1 Tab by mouth four (4) times daily as needed for Pain for up to 30 days. Max Daily Amount: 80 mg.  
 Class: Print Route: Oral  
 oxyCODONE IR (ROXICODONE) 20 mg immediate release tablet 0 Starting on: 5/30/2018 Sig: Take 1 Tab by mouth four (4) times daily as needed for Pain for up to 30 days. Max Daily Amount: 80 mg.  
 Class: Print Route: Oral  
  
Follow-up Instructions Return in about 3 months (around 7/2/2018). Patient Instructions 1. Continue current plan with no evidence of addiction or diversion. Stable on current medication without adverse events. 2. Refill and adjust oxycodone 30 mg down to 20 mg 4 times daily as needed. Please work on reducing this down to half to 1 tablet on an as-needed basis only. We will discuss long-acting medication further next visit. 3. Naloxone 4 mg nasal spray for opioid induced respiratory depression emergency only. 4. Discussed risks of addiction, dependency, and opioid induced hyperalgesia. 5. Return to clinic in 3 months Introducing [facility]! Dear Denise Gamez: 
Thank you for requesting a [employer] account. Our records indicate that you already have an active [employer] account. You can access your account anytime at [email or web address] Did you know that you can access your hospital and ER discharge instructions at any time in [employer]? You can also review all of your test results from your hospital stay or ER visit. Additional Information If you have questions, please visit the Frequently Asked Questions section of the [employer] website at [email or web address] Remember, [employer] is NOT to be used for urgent needs. For medical emergencies, dial 911. Now available from your iPhone and Android! Please provide this summary of care documentation to your next provider. Your primary care clinician is listed as Early Ian. If you have any questions after today's visit, please call [phone].

## 2018-07-02 ENCOUNTER — HOSPITAL ENCOUNTER (OUTPATIENT)
Dept: LAB | Age: 58
Discharge: HOME | End: 2018-07-02
Payer: MEDICAID

## 2018-07-02 ENCOUNTER — OFFICE VISIT (OUTPATIENT)
Dept: PAIN MANAGEMENT | Age: 58
End: 2018-07-02

## 2018-07-02 VITALS
BODY MASS INDEX: 23.92 KG/M2 | DIASTOLIC BLOOD PRESSURE: 63 MMHG | HEART RATE: 73 BPM | WEIGHT: 130 LBS | HEIGHT: 62 IN | SYSTOLIC BLOOD PRESSURE: 100 MMHG | TEMPERATURE: 97.3 F | RESPIRATION RATE: 14 BRPM

## 2018-07-02 DIAGNOSIS — M54.40 CHRONIC MIDLINE LOW BACK PAIN WITH SCIATICA, SCIATICA LATERALITY UNSPECIFIED: Primary | ICD-10-CM

## 2018-07-02 DIAGNOSIS — M54.16 LUMBAR RADICULOPATHY: ICD-10-CM

## 2018-07-02 DIAGNOSIS — M51.36 LUMBAR DEGENERATIVE DISC DISEASE: ICD-10-CM

## 2018-07-02 DIAGNOSIS — Z79.899 ENCOUNTER FOR LONG-TERM (CURRENT) USE OF MEDICATIONS: ICD-10-CM

## 2018-07-02 DIAGNOSIS — M54.12 BRACHIAL NEURITIS OR RADICULITIS: ICD-10-CM

## 2018-07-02 DIAGNOSIS — G89.29 CHRONIC MIDLINE LOW BACK PAIN WITH SCIATICA, SCIATICA LATERALITY UNSPECIFIED: Primary | ICD-10-CM

## 2018-07-02 DIAGNOSIS — M79.7 FIBROMYALGIA: ICD-10-CM

## 2018-07-02 DIAGNOSIS — M96.1 POSTLAMINECTOMY SYNDROME, LUMBAR REGION: ICD-10-CM

## 2018-07-02 DIAGNOSIS — Z79.899 ENCOUNTER FOR LONG-TERM (CURRENT) USE OF HIGH-RISK MEDICATION: ICD-10-CM

## 2018-07-02 DIAGNOSIS — M47.816 SPONDYLOSIS OF LUMBAR REGION WITHOUT MYELOPATHY OR RADICULOPATHY: ICD-10-CM

## 2018-07-02 DIAGNOSIS — M47.817 LUMBOSACRAL SPONDYLOSIS WITHOUT MYELOPATHY: ICD-10-CM

## 2018-07-02 PROCEDURE — 36415 COLL VENOUS BLD VENIPUNCTURE: CPT | Performed by: PHYSICIAN ASSISTANT

## 2018-07-02 PROCEDURE — 80307 DRUG TEST PRSMV CHEM ANLYZR: CPT | Performed by: PHYSICIAN ASSISTANT

## 2018-07-02 RX ORDER — OXYCODONE HYDROCHLORIDE 15 MG/1
15 TABLET ORAL
Qty: 100 TAB | Refills: 0 | Status: SHIPPED | OUTPATIENT
Start: 2018-09-01 | End: 2018-09-27 | Stop reason: SDUPTHER

## 2018-07-02 RX ORDER — OXYCODONE HYDROCHLORIDE 15 MG/1
15 TABLET ORAL
Qty: 120 TAB | Refills: 0 | Status: SHIPPED | OUTPATIENT
Start: 2018-07-03 | End: 2018-09-27 | Stop reason: SDUPTHER

## 2018-07-02 RX ORDER — OXYCODONE HYDROCHLORIDE 15 MG/1
15 TABLET ORAL
Qty: 110 TAB | Refills: 0 | Status: SHIPPED | OUTPATIENT
Start: 2018-08-02 | End: 2018-09-27 | Stop reason: SDUPTHER

## 2018-07-02 NOTE — PROGRESS NOTES
Nursing Notes    Patient presents to the office today in follow-up. Patient rates her pain at 7/10 on the numerical pain scale. Reviewed medications with counts as follows:    Rx Date filled Qty Dispensed Pill Count Last Dose Short   Oxycodone 20 mg 05/31/18 120 8 today no                                      PHQ over the last two weeks 7/2/2018   PHQ Not Done Active Diagnosis of Depression or Bipolar Disorder   Little interest or pleasure in doing things -   Feeling down, depressed or hopeless -   Total Score PHQ 2 -     Provider aware of depression screening. Comments:     POC UDS was performed in office today    Any new labs or imaging since last appointment? NO    Have you been to an emergency room (ER) or urgent care clinic since your last visit? NO            Have you been hospitalized since your last visit? NO     If yes, where, when, and reason for visit? Have you seen or consulted any other health care providers outside of the [address] [place] [address]  since your last visit? NO     If yes, where, when, and reason for visit? Ms. Dixie Monsivais has a reminder for a \"due or due soon\" health maintenance. I have asked that she contact her primary care provider for follow-up on this health maintenance.

## 2018-07-02 NOTE — PROGRESS NOTES
HISTORY OF PRESENT ILLNESS  Kathie Robles is a 61 y.o. female    HPI: Ms. Deb Robles  returns today for f/u of  chronic, severe pain involving the lumbar spine with radiation down the posterior left greater than right lower extremities to the mid calf associated with numbness and tingling. She suffers from possible rheumatoid arthritis as well as a post lumbar laminectomy pain syndrome and cervical and lumbar degenerative disc disease. Ms. Deb Robles continues unchanged since last visit. She continues to do well with her current treatment plan but continues to complain of minimal to moderate improvement. We spent most of today's visit discussing changes to our practice. Explained her that moving forward we will be focusing on more conservative and non-opioid plan of care. This will result in changes to her current treatment plan. We began tapering her oxycodone last visit. We will continue tapering oxycodone. Please see tapering plan below. I will have her follow-up in 3 months for further evaluation and recommendation. She was asked to call and cancel her appointment and pain management agreement she does decide to transfer care. We also discussed the risks associated with opioids and benzodiazepines. I have asked her to discuss alternative treatments regarding Klonopin is with her psychiatrist as soon as possible as we were no longer be able to prescribe opioid medications while she is concurrently taking benzodiazepines. Current medication management includes oxycodone 20 mg 4 times daily as needed. Klonopin and Soma from an outside provider. She also receives Topamax from her neurologist.  Haldol and Zoloft from her psychiatrist.  Medications are helping with pain control and quality of life. Her pain is 7-8/10 with medication and 9/10 without. Pt describes pain as sharp. Aggravating factors include standing, bending, and walking. Relieved with rest, medication, and avoiding painful activities. Current treatment is helping to improve general activity, mood, sleep, and enjoyment of life    Ms. Helton is tolerating medications well, with no side effects noted. She is able to stay more active with less discomfort with these current doses. The patient reports an average of 20% pain relief with current treatment/medications. She is informed of side effects, risks, and benefits of this regimen, and emphasizes that she derives a significant improvement in functionality and quality of life, and notes that non-opioid medications and therapies in the past have not offered significant benefit. She  is otherwise doing well with no other complaints today. She denies any adverse events including nausea, vomiting, dizziness, increased constipation, hallucinations, or seizures. Because the patient's current regimen places him/her at increased risk for possible overdose, a prescription for naloxone nasal spray has been provided. The patient understands that this medication is only to be used in the setting of a possible overdose and that inadvertent use of this medication could precipitate overt withdrawal.    MME: 120    POC UDS today. Confirmation pending. Allergies   Allergen Reactions    Dilaudid [Hydromorphone (Bulk)] Palpitations     tachycardia    Augmentin [Amoxicillin-Pot Clavulanate] Nausea and Vomiting    Aspirin Other (comments)     tinnitus    Ceclor [Cefaclor] Other (comments)     Patient not sure of reaction    Codeine Other (comments)     GI       Past Surgical History:   Procedure Laterality Date    HX APPENDECTOMY      HX GYN  1989    partial hysterectomy     HX ORTHOPAEDIC  2007    lumbar fusion     HX TONSILLECTOMY           Review of Systems   Constitutional: Negative for chills, fever and weight loss. HENT: Negative for congestion and sore throat. Eyes: Negative for blurred vision and double vision. Respiratory: Negative for cough, shortness of breath and wheezing. Cardiovascular: Positive for palpitations. Negative for chest pain. Gastrointestinal: Negative for abdominal pain, constipation, diarrhea, heartburn, nausea and vomiting. Genitourinary: Negative. Musculoskeletal: Positive for back pain and neck pain. Neurological: Negative for dizziness, seizures and loss of consciousness. Endo/Heme/Allergies: Does not bruise/bleed easily. Psychiatric/Behavioral: Positive for depression. The patient is nervous/anxious. The patient does not have insomnia. Physical Exam   Constitutional: She is oriented to person, place, and time and well-developed, well-nourished, and in no distress. No distress. HENT:   Head: Normocephalic and atraumatic. Eyes: EOM are normal.   Pulmonary/Chest: Effort normal.   Neurological: She is alert and oriented to person, place, and time. Skin: Skin is warm and dry. No rash noted. She is not diaphoretic. No erythema. Psychiatric: Mood, memory, affect and judgment normal.   Nursing note and vitals reviewed. ASSESSMENT:    1. Chronic midline low back pain with sciatica, sciatica laterality unspecified    2. Encounter for long-term (current) use of high-risk medication    3. Lumbar radiculopathy    4. Lumbosacral spondylosis without myelopathy    5. Postlaminectomy syndrome, lumbar region    6. Fibromyalgia    7. Brachial neuritis or radiculitis    8. Spondylosis of lumbar region without myelopathy or radiculopathy    9. Lumbar degenerative disc disease    10. Encounter for long-term (current) use of medications         [facility] Prescription Monitoring Program was reviewed which does not demonstrate aberrancies and/or inconsistencies with regard to the historical prescribing of controlled medications to this patient by other providers. PLAN / Pt Instructions:  1. Continue current plan with no evidence of addiction or diversion. Stable on current medication without adverse events.     2. Refill and adjust oxycodone 20 mg down to 15 mg up to 4 times daily as needed ×1 month, then adjust down to 15 mg 1 tablet up to 34 times daily, no more than 110 per month ×1 month, then adjust down to 15 mg 1 tablet up to 3-4 times daily, no more than #100 per month until next visit. 3. Please discuss alternative treatments with regarding Klonopin with your psychiatrist as soon as possible as we were no longer be able to prescribe opioid medications while you are concurrently taking benzodiazepines. 4. Naloxone 4 mg nasal spray for opioid induced respiratory depression emergency only. 5. Discussed risks of addiction, dependency, and opioid induced hyperalgesia. 6. Please remember to call at least 4-5 business days prior to your medication refill. 7. Return to clinic in 3 months. Please call and cancel your appointment and pain management agreement if you do decide to transfer your care. Medications Ordered Today   Medications    oxyCODONE IR (OXY-IR) 15 mg immediate release tablet     Sig: Take 1 Tab by mouth four (4) times daily as needed for Pain for up to 30 days. Max Daily Amount: 60 mg. Dispense:  120 Tab     Refill:  0    oxyCODONE IR (OXY-IR) 15 mg immediate release tablet     Sig: Take 1 Tab by mouth four (4) times daily as needed for Pain for up to 30 days. Max Daily Amount: 60 mg. No more than #110 per month     Dispense:  110 Tab     Refill:  0    oxyCODONE IR (OXY-IR) 15 mg immediate release tablet     Sig: Take 1 Tab by mouth four (4) times daily as needed for Pain for up to 30 days. Max Daily Amount: 60 mg. No more than #100 per month     Dispense:  100 Tab     Refill:  0         DISPOSITION     Pain medications are prescribed with the objective of pain relief and improved physical and psychosocial function in this patient.  Pain Meds and Quality Of Life have been reviewed. Nonpharmacologic therapy and non-opioid pharmacologic therapy have been considered.  Opioid therapy is only prescribed if the expected benefits are anticipated to outweigh risks.  Counseled patient on proper use of prescribed medications.  Reviewed with patient self-help tools, home exercise, and lifestyle changes to assist the patient in self-management of symptoms.  Reviewed with patient the treatment plan, goals of treatment plan, and limitations of treatment plan, to include the potential for side effects from medications and procedures. If side effects occur, it is the responsibility of the patient to inform the clinic so that a change in the treatment plan can be made in a safe manner. The patient is advised that stopping prescribed medication may cause an increase in symptoms and possible medication withdrawal symptoms. The patient is informed an emergency room evaluation may be necessary if this occurs. Spent 25 minutes with patient today which more than 50% of that time was spent on counseling and coordination of care. Axel Hermosillo 7/2/2018        Note: Please excuse any typographical errors. Voice recognition software was used for this note and may cause mistakes.

## 2018-07-02 NOTE — MR AVS SNAPSHOT
[address] [address] 
[phone] Patient: Lucina Mcclain MRN: [id number]:[id number] Visit Information Date & Time Provider Department Dept. Phone Encounter #  
 7/2/2018  1:20 PM Bozena Silveira [address] [facility] [phone] [id number] Follow-up Instructions Return in about 3 months (around 10/2/2018). Upcoming Health Maintenance Date Due Hepatitis C Screening 1960 Pneumococcal 19-64 Medium Risk (1 of 1 - PPSV23) 7/11/1978 DTaP/Tdap/Td series (1 - Tdap) 7/11/1980 PAP AKA CERVICAL CYTOLOGY 7/11/1980 BREAST CANCER SCRN MAMMOGRAM 7/11/2009 FOBT Q 1 YEAR AGE 50-75 7/11/2009 Influenza Age 5 to Adult 8/1/2018 Allergies as of 7/2/2018  Review Complete On: 7/2/2018 By: KACIE Silveira Severity Noted Reaction Type Reactions Dilaudid [Hydromorphone (Bulk)] High 01/31/2017    Palpitations  
 tachycardia Augmentin [Amoxicillin-pot Clavulanate] Medium 01/31/2017    Nausea and Vomiting Aspirin    Other (comments)  
 tinnitus Ceclor [Cefaclor]  01/31/2017    Other (comments) Patient not sure of reaction Codeine    Other (comments) GI  
  
Current Immunizations  Never Reviewed No immunizations on file. Not reviewed this visit You Were Diagnosed With   
  
 Codes Comments Chronic midline low back pain with sciatica, sciatica laterality unspecified    -  Primary ICD-10-CM: M54.40, G89.29 ICD-9-CM: 724.2, 724.3, 338.29 Encounter for long-term (current) use of high-risk medication     ICD-10-CM: Z79.899 ICD-9-CM: V58.69 Lumbar radiculopathy     ICD-10-CM: M54.16 
ICD-9-CM: 724.4 Lumbosacral spondylosis without myelopathy     ICD-10-CM: N31.479 ICD-9-CM: 721.3 Postlaminectomy syndrome, lumbar region     ICD-10-CM: M96.1 ICD-9-CM: 722.83 Fibromyalgia     ICD-10-CM: M79.7 ICD-9-CM: 729.1 Brachial neuritis or radiculitis     ICD-10-CM: M54.12 
ICD-9-CM: 723.4 Spondylosis of lumbar region without myelopathy or radiculopathy     ICD-10-CM: M47.816 ICD-9-CM: 721.3 Lumbar degenerative disc disease     ICD-10-CM: M51.36 
ICD-9-CM: 722.52 Encounter for long-term (current) use of medications     ICD-10-CM: Z79.899 ICD-9-CM: V58.69 Vitals BP Pulse Temp Resp Height(growth percentile) Weight(growth percentile) 100/63 (BP 1 Location: Right arm, BP Patient Position: Sitting) 73 97.3 °F (36.3 °C) (Oral) 14 5' 2\" (1.575 m) 130 lb (59 kg) BMI OB Status Smoking Status 23.78 kg/m2 Hysterectomy Current Every Day Smoker Vitals History BMI and BSA Data Body Mass Index Body Surface Area 23.78 kg/m 2 1.61 m 2 Preferred Pharmacy Pharmacy Name Phone [facility] [address] [place] [phone] Your Updated Medication List  
  
   
This list is accurate as of 7/2/18  3:49 PM.  Always use your most recent med list.  
  
  
  
  
 benzonatate 200 mg capsule Commonly known as:  TESSALON Take 200 mg by mouth three (3) times daily as needed for Cough. CRANBERRY PO Take 3,600 mg by mouth daily. dicyclomine 20 mg tablet Commonly known as:  BENTYL Take 20 mg by mouth every six (6) hours. DOCUSOFT S 100 mg capsule Generic drug:  docusate sodium Take 100 mg by mouth two (2) times a day. FLUoxetine 20 mg tablet Commonly known as:  PROzac Take 20 mg by mouth daily. gabapentin 600 mg tablet Commonly known as:  NEURONTIN  
1 po bid x 14 days, then 1 po tid  Indications: NEUROPATHIC PAIN  
  
 haloperidol 0.5 mg tablet Commonly known as:  HALDOL Take 0.5 mg by mouth. hydroCHLOROthiazide 12.5 mg tablet Commonly known as:  HYDRODIURIL Take 1 Tab by mouth daily. KlonoPIN 1 mg tablet Generic drug:  clonazePAM  
 Take 1 mg by mouth three (3) times daily. linaclotide 145 mcg Cap capsule Commonly known as:  Davina Gross Take  by mouth Daily (before breakfast). mirtazapine 45 mg tablet Commonly known as:  Shane Johnson Take 45 mg by mouth nightly. OTHER Take 1 Tab by mouth daily. I cool tabs * oxyCODONE IR 15 mg immediate release tablet Commonly known as:  OXY-IR Take 1 Tab by mouth four (4) times daily as needed for Pain for up to 30 days. Max Daily Amount: 60 mg. Start taking on:  7/3/2018 * oxyCODONE IR 15 mg immediate release tablet Commonly known as:  OXY-IR Take 1 Tab by mouth four (4) times daily as needed for Pain for up to 30 days. Max Daily Amount: 60 mg. No more than #110 per month Start taking on:  8/2/2018 * oxyCODONE IR 15 mg immediate release tablet Commonly known as:  OXY-IR Take 1 Tab by mouth four (4) times daily as needed for Pain for up to 30 days. Max Daily Amount: 60 mg. No more than #100 per month Start taking on:  9/1/2018 potassium chloride SA 10 mEq capsule Commonly known as:  Georganna Males Take 10 mEq by mouth two (2) times a day. solifenacin 10 mg tablet Commonly known as:  Felix Player Take 10 mg by mouth daily. SOMA 350 mg tablet Generic drug:  carisoprodol Take 350 mg by mouth four (4) times daily. STRESSTABS PO Take  by mouth daily. TOPAMAX 50 mg tablet Generic drug:  topiramate Take 50 mg by mouth Every Mon, Wed & Sun.  
  
 TOPROL XL 50 mg XL tablet Generic drug:  metoprolol succinate Take  by mouth daily. ZOLOFT 25 mg tablet Generic drug:  sertraline Take 100 mg by mouth two (2) times a day. * Notice: This list has 3 medication(s) that are the same as other medications prescribed for you. Read the directions carefully, and ask your doctor or other care provider to review them with you. Prescriptions Printed Refills oxyCODONE IR (OXY-IR) 15 mg immediate release tablet 0 Starting on: 7/3/2018 Sig: Take 1 Tab by mouth four (4) times daily as needed for Pain for up to 30 days. Max Daily Amount: 60 mg.  
 Class: Print Route: Oral  
 oxyCODONE IR (OXY-IR) 15 mg immediate release tablet 0 Starting on: 8/2/2018 Sig: Take 1 Tab by mouth four (4) times daily as needed for Pain for up to 30 days. Max Daily Amount: 60 mg. No more than #110 per month Class: Print Route: Oral  
 oxyCODONE IR (OXY-IR) 15 mg immediate release tablet 0 Starting on: 9/1/2018 Sig: Take 1 Tab by mouth four (4) times daily as needed for Pain for up to 30 days. Max Daily Amount: 60 mg. No more than #100 per month Class: Print Route: Oral  
  
We Performed the Following AMB POC DRUG SCREEN () [ \[facility]\""] DRUG SCREEN [[id number] Custom] Follow-up Instructions Return in about 3 months (around 10/2/2018). To-Do List   
 07/02/2018 Lab:  10-DRUG SCREEN, WHOLE BLD Patient Instructions 1. Continue current plan with no evidence of addiction or diversion. Stable on current medication without adverse events. 2. Refill and adjust oxycodone 20 mg down to 15 mg up to 4 times daily as needed ×1 month, then adjust down to 15 mg 1 tablet up to 34 times daily, no more than 110 per month ×1 month, then adjust down to 15 mg 1 tablet up to 3-4 times daily, no more than #100 per month until next visit. 3. Please discuss alternative treatments with regarding Klonopin with your psychiatrist as soon as possible as we were no longer be able to prescribe opioid medications while you are concurrently taking benzodiazepines. 4. Naloxone 4 mg nasal spray for opioid induced respiratory depression emergency only. 5. Discussed risks of addiction, dependency, and opioid induced hyperalgesia. 6. Please remember to call at least 4-5 business days prior to your medication refill. 7. Return to clinic in 3 months. Please call and cancel your appointment and pain management agreement if you do decide to transfer your care. Introducing [facility] [place] [facility] SERVICES! Dear Fani Arndt: 
Thank you for requesting a [employer] account. Our records indicate that you already have an active [employer] account. You can access your account anytime at [email or web address]. [email or web address] Did you know that you can access your hospital and ER discharge instructions at any time in [employer]? You can also review all of your test results from your hospital stay or ER visit. Additional Information If you have questions, please visit the Frequently Asked Questions section of the [employer] website at [email or web address] Remember, [employer] is NOT to be used for urgent needs. For medical emergencies, dial 911. Now available from your iPhone and Android! Please provide this summary of care documentation to your next provider. Your primary care clinician is listed as Emily Rosario. If you have any questions after today's visit, please call [phone].

## 2018-07-02 NOTE — PATIENT INSTRUCTIONS
1. Continue current plan with no evidence of addiction or diversion. Stable on current medication without adverse events. 2. Refill and adjust oxycodone 20 mg down to 15 mg up to 4 times daily as needed ×1 month, then adjust down to 15 mg 1 tablet up to 34 times daily, no more than 110 per month ×1 month, then adjust down to 15 mg 1 tablet up to 3-4 times daily, no more than #100 per month until next visit. 3. Please discuss alternative treatments with regarding Klonopin with your psychiatrist as soon as possible as we were no longer be able to prescribe opioid medications while you are concurrently taking benzodiazepines. 4. Naloxone 4 mg nasal spray for opioid induced respiratory depression emergency only. 5. Discussed risks of addiction, dependency, and opioid induced hyperalgesia. 6. Please remember to call at least 4-5 business days prior to your medication refill. 7. Return to clinic in 3 months. Please call and cancel your appointment and pain management agreement if you do decide to transfer your care.

## 2018-07-10 LAB
6-ACETYLMORPHINE: NEGATIVE
CODEINE, 737848: NEGATIVE NG/ML
DIHYDROCODEINE, 764159: NEGATIVE NG/ML
HYDROCODONE, 737854: NEGATIVE NG/ML
HYDROMORPHONE, 737852: NEGATIVE NG/ML
MORPHINE, 737850: NEGATIVE NG/ML
OPIATE CONFIRMATION,OPIC: NEGATIVE
OXYCOCONE: 82.3 NG/ML
OXYCODONES CONFIRMATION, 738393: POSITIVE
OXYMORPHONE, 763902: NEGATIVE NG/ML

## 2018-07-13 LAB
7-AMINOCLONAZEPAM: 15 NG/ML
ALPRAZOLAM, 763914: NEGATIVE NG/ML
AMPHETAMINES SERPL QL SCN: NEGATIVE NG/ML
BARBITURATES SERPL QL SCN: NEGATIVE UG/ML
BENZODIAZ SERPL QL SCN: NORMAL NG/ML
BENZODIAZEPINES CONFIRM: POSITIVE
CANNABINOIDS SERPL QL SCN: NEGATIVE NG/ML
CHLORDIAZEPOXIDE, 716035: NEGATIVE NG/ML
CLONAZEPAM, 763916: 12 NG/ML
COCAINE+BZE SERPL QL SCN: NEGATIVE NG/ML
DESALKYLFLURAZEPAM, 767601: NEGATIVE NG/ML
DESMETHYLCHLORDIAZEPOXIDE, 810910: NEGATIVE NG/ML
DESMETHYLDIAZEPAM, RMBN16: NEGATIVE NG/ML
DIAZEPAM, 810905: NEGATIVE NG/ML
FLURAZEPAM, 790417: NEGATIVE NG/ML
LORAZEPAM, 763912: NEGATIVE NG/ML
METHADONE SERPL QL SCN: NEGATIVE NG/ML
MIDAZOLAM, 763922: NEGATIVE NG/ML
OPIATES SERPL QL SCN: NEGATIVE NG/ML
OXAZEPAM CONFIRM, 763908: NEGATIVE NG/ML
OXYCODONE, 790407: NORMAL NG/ML
PCP SERPL QL SCN: NEGATIVE NG/ML
PROPOXYPH SERPL QL SCN: NEGATIVE NG/ML
TEMAZEPAM, 763918: NEGATIVE NG/ML
TRIAZOLAM, 763920: NEGATIVE NG/ML

## 2018-08-03 ENCOUNTER — TELEPHONE (OUTPATIENT)
Dept: PAIN MANAGEMENT | Age: 58
End: 2018-08-03

## 2018-08-03 NOTE — TELEPHONE ENCOUNTER
Talon Ramos has called requesting a refill of their controlled medication, Oxycodone 15 mg, for the management of Chronic midline low back pain with sciatica, sciatica laterality unspecified. Last office visit date: 1960    Date last  was pulled and reviewed : 8/3/18 and compliant. Last filled 7/5/18    Was the patient compliant when the above report was pulled? yes    Analgesia: Patient report 80% of pain relief with current medication regimen    Aberrancies: No aberrancies in the last 30 days. ADL's: Patient report she is able to do basic ADL's at home. Adverse Reaction:Patient report no adverse reaction. Provider's last note and plan of care reviewed? yes  Request forwarded to provider for review. Provider was made aware.

## 2018-08-25 ENCOUNTER — HOSPITAL ENCOUNTER (EMERGENCY)
Age: 58
Discharge: HOME | End: 2018-08-25
Attending: EMERGENCY MEDICINE
Payer: MEDICAID

## 2018-08-25 VITALS
HEIGHT: 61 IN | SYSTOLIC BLOOD PRESSURE: 110 MMHG | WEIGHT: 117 LBS | TEMPERATURE: 98 F | RESPIRATION RATE: 20 BRPM | HEART RATE: 76 BPM | DIASTOLIC BLOOD PRESSURE: 69 MMHG | OXYGEN SATURATION: 96 % | BODY MASS INDEX: 22.09 KG/M2

## 2018-08-25 DIAGNOSIS — E87.6 HYPOKALEMIA: Primary | ICD-10-CM

## 2018-08-25 DIAGNOSIS — F13.930 BENZODIAZEPINE WITHDRAWAL WITHOUT COMPLICATION (HCC): ICD-10-CM

## 2018-08-25 LAB
ALBUMIN SERPL-MCNC: 4 G/DL (ref 3.4–5)
ALBUMIN/GLOB SERPL: 0.9 {RATIO} (ref 0.8–1.7)
ALP SERPL-CCNC: 96 U/L (ref 45–117)
ALT SERPL-CCNC: 28 U/L (ref 13–56)
ANION GAP SERPL CALC-SCNC: 6 MMOL/L (ref 3–18)
ANION GAP SERPL CALC-SCNC: 7 MMOL/L (ref 3–18)
AST SERPL-CCNC: 19 U/L (ref 15–37)
BASOPHILS # BLD: 0 K/UL (ref 0–0.1)
BASOPHILS NFR BLD: 0 % (ref 0–2)
BILIRUB SERPL-MCNC: 0.2 MG/DL (ref 0.2–1)
BUN SERPL-MCNC: 10 MG/DL (ref 7–18)
BUN SERPL-MCNC: 8 MG/DL (ref 7–18)
BUN/CREAT SERPL: 13 (ref 12–20)
BUN/CREAT SERPL: 13 (ref 12–20)
CALCIUM SERPL-MCNC: 8.3 MG/DL (ref 8.5–10.1)
CALCIUM SERPL-MCNC: 9.3 MG/DL (ref 8.5–10.1)
CHLORIDE SERPL-SCNC: 103 MMOL/L (ref 100–108)
CHLORIDE SERPL-SCNC: 109 MMOL/L (ref 100–108)
CO2 SERPL-SCNC: 28 MMOL/L (ref 21–32)
CO2 SERPL-SCNC: 30 MMOL/L (ref 21–32)
CREAT SERPL-MCNC: 0.62 MG/DL (ref 0.6–1.3)
CREAT SERPL-MCNC: 0.78 MG/DL (ref 0.6–1.3)
DIFFERENTIAL METHOD BLD: ABNORMAL
EOSINOPHIL # BLD: 0.2 K/UL (ref 0–0.4)
EOSINOPHIL NFR BLD: 1 % (ref 0–5)
ERYTHROCYTE [DISTWIDTH] IN BLOOD BY AUTOMATED COUNT: 13.9 % (ref 11.6–14.5)
GLOBULIN SER CALC-MCNC: 4.7 G/DL (ref 2–4)
GLUCOSE SERPL-MCNC: 106 MG/DL (ref 74–99)
GLUCOSE SERPL-MCNC: 110 MG/DL (ref 74–99)
HCT VFR BLD AUTO: 42.2 % (ref 35–45)
HGB BLD-MCNC: 14.9 G/DL (ref 12–16)
LYMPHOCYTES # BLD: 3 K/UL (ref 0.9–3.6)
LYMPHOCYTES NFR BLD: 22 % (ref 21–52)
MAGNESIUM SERPL-MCNC: 2.4 MG/DL (ref 1.6–2.6)
MCH RBC QN AUTO: 30.3 PG (ref 24–34)
MCHC RBC AUTO-ENTMCNC: 35.3 G/DL (ref 31–37)
MCV RBC AUTO: 85.8 FL (ref 74–97)
MONOCYTES # BLD: 1.2 K/UL (ref 0.05–1.2)
MONOCYTES NFR BLD: 9 % (ref 3–10)
NEUTS SEG # BLD: 9.2 K/UL (ref 1.8–8)
NEUTS SEG NFR BLD: 68 % (ref 40–73)
PLATELET # BLD AUTO: 457 K/UL (ref 135–420)
PMV BLD AUTO: 10.5 FL (ref 9.2–11.8)
POTASSIUM SERPL-SCNC: 2.5 MMOL/L (ref 3.5–5.5)
POTASSIUM SERPL-SCNC: 3.2 MMOL/L (ref 3.5–5.5)
PROT SERPL-MCNC: 8.7 G/DL (ref 6.4–8.2)
RBC # BLD AUTO: 4.92 M/UL (ref 4.2–5.3)
SODIUM SERPL-SCNC: 139 MMOL/L (ref 136–145)
SODIUM SERPL-SCNC: 144 MMOL/L (ref 136–145)
WBC # BLD AUTO: 13.5 K/UL (ref 4.6–13.2)

## 2018-08-25 PROCEDURE — 74011250636 HC RX REV CODE- 250/636: Performed by: EMERGENCY MEDICINE

## 2018-08-25 PROCEDURE — 80053 COMPREHEN METABOLIC PANEL: CPT | Performed by: EMERGENCY MEDICINE

## 2018-08-25 PROCEDURE — 74011250637 HC RX REV CODE- 250/637: Performed by: EMERGENCY MEDICINE

## 2018-08-25 PROCEDURE — 99285 EMERGENCY DEPT VISIT HI MDM: CPT

## 2018-08-25 PROCEDURE — 85025 COMPLETE CBC W/AUTO DIFF WBC: CPT | Performed by: EMERGENCY MEDICINE

## 2018-08-25 PROCEDURE — 74011000258 HC RX REV CODE- 258: Performed by: EMERGENCY MEDICINE

## 2018-08-25 PROCEDURE — 83735 ASSAY OF MAGNESIUM: CPT | Performed by: EMERGENCY MEDICINE

## 2018-08-25 PROCEDURE — 96361 HYDRATE IV INFUSION ADD-ON: CPT

## 2018-08-25 PROCEDURE — 96365 THER/PROPH/DIAG IV INF INIT: CPT

## 2018-08-25 RX ORDER — LOPERAMIDE HYDROCHLORIDE 2 MG/1
2 CAPSULE ORAL ONCE
Status: COMPLETED | OUTPATIENT
Start: 2018-08-25 | End: 2018-08-25

## 2018-08-25 RX ORDER — POTASSIUM CHLORIDE 20 MEQ/1
40 TABLET, EXTENDED RELEASE ORAL
Status: COMPLETED | OUTPATIENT
Start: 2018-08-25 | End: 2018-08-25

## 2018-08-25 RX ORDER — ACETAMINOPHEN 500 MG
500 TABLET ORAL
Status: COMPLETED | OUTPATIENT
Start: 2018-08-25 | End: 2018-08-25

## 2018-08-25 RX ADMIN — ACETAMINOPHEN 500 MG: 500 TABLET, FILM COATED ORAL at 17:54

## 2018-08-25 RX ADMIN — POTASSIUM CHLORIDE 40 MEQ: 20 TABLET, EXTENDED RELEASE ORAL at 16:13

## 2018-08-25 RX ADMIN — LOPERAMIDE HYDROCHLORIDE 2 MG: 2 CAPSULE ORAL at 15:11

## 2018-08-25 RX ADMIN — SODIUM CHLORIDE 1000 ML: 900 INJECTION, SOLUTION INTRAVENOUS at 15:11

## 2018-08-25 RX ADMIN — POTASSIUM CHLORIDE 40 MEQ: 20 TABLET, EXTENDED RELEASE ORAL at 19:36

## 2018-08-25 RX ADMIN — LIDOCAINE HYDROCHLORIDE: 10 INJECTION, SOLUTION EPIDURAL; INFILTRATION; INTRACAUDAL; PERINEURAL at 16:32

## 2018-08-25 NOTE — ED TRIAGE NOTES
Patient states her pain management physician took her off Clonopin because it was interferring with her oxycodone.  He told her he would not give her any oxycodone until she comes off the Clonopin

## 2018-08-25 NOTE — ED PROVIDER NOTES
EMERGENCY DEPARTMENT HISTORY AND PHYSICAL EXAM    3:10 PM      Date: 8/25/2018  Patient Name: Nayana Dumont    History of Presenting Illness     Chief Complaint   Patient presents with    Medication Management         History Provided By: Patient    Additional History (Context): Nayana Dumont is a 60 y.o. female with a past medical history of arrhythmia and headaches who presents with c/o Klonopin withdrawal symptoms onset 5 days ago. Symptoms include diarrhea, 17 lb weight loss, headache, neck pain, sleep difficulty, and decreased appetite. Patient states that she has been weaning off Klonopin with prescriptions that are given to her by her psychiatrist. She notes, however, that she has been having these withdrawal symptoms. Patient denies any other associated symptoms or complaints. PCP: Rosana Ingram MD    Chief Complaint: Withdrawal symptoms from medication  Duration: 5 Days  Timing:  Constant  Location: Generalized  Severity: 8 out of 10   Modifying Factors: Patient is weaning off Klonopin  Associated Symptoms: Diarrhea, 17 lb weight loss, headache, neck pain, sleep difficulty, and decreased appetite. Patient denies any other associated symptoms or complaints. Current Outpatient Prescriptions   Medication Sig Dispense Refill    oxyCODONE IR (OXY-IR) 15 mg immediate release tablet Take 1 Tab by mouth four (4) times daily as needed for Pain for up to 30 days. Max Daily Amount: 60 mg. No more than #110 per month 110 Tab 0    [START ON 9/1/2018] oxyCODONE IR (OXY-IR) 15 mg immediate release tablet Take 1 Tab by mouth four (4) times daily as needed for Pain for up to 30 days. Max Daily Amount: 60 mg. No more than #100 per month 100 Tab 0    FLUoxetine (PROZAC) 20 mg tablet Take 20 mg by mouth daily.  solifenacin (VESICARE) 10 mg tablet Take 10 mg by mouth daily.  potassium chloride SA (MICRO-K) 10 mEq capsule Take 10 mEq by mouth two (2) times a day.       carisoprodol (SOMA) 350 mg tablet Take 350 mg by mouth four (4) times daily.  hydrochlorothiazide (HYDRODIURIL) 12.5 mg tablet Take 1 Tab by mouth daily. 30 Tab 5    linaclotide (LINZESS) 145 mcg cap capsule Take  by mouth Daily (before breakfast).  benzonatate (TESSALON) 200 mg capsule Take 200 mg by mouth three (3) times daily as needed for Cough.  topiramate (TOPAMAX) 50 mg tablet Take 50 mg by mouth Every Mon, Wed & Sun.      haloperidol (HALDOL) 0.5 mg tablet Take 0.5 mg by mouth.  dicyclomine (BENTYL) 20 mg tablet Take 20 mg by mouth every six (6) hours.  mirtazapine (REMERON) 45 mg tablet Take 45 mg by mouth nightly.  docusate sodium (DOCUSOFT S) 100 mg capsule Take 100 mg by mouth two (2) times a day.  sertraline (ZOLOFT) 25 mg tablet Take 100 mg by mouth two (2) times a day.  gabapentin (NEURONTIN) 600 mg tablet 1 po bid x 14 days, then 1 po tid  Indications: NEUROPATHIC PAIN (Patient taking differently: 300 mg two (2) times a day. 1 po bid x 14 days, then 1 po tid  Indications: NEUROPATHIC PAIN) 90 Tab 5    CRANBERRY EXTRACT (CRANBERRY PO) Take 3,600 mg by mouth daily.  clonazePAM (KLONOPIN) 1 mg tablet Take 1 mg by mouth three (3) times daily.  OTHER Take 1 Tab by mouth daily. I cool tabs       metoprolol-XL (TOPROL XL) 50 mg XL tablet Take  by mouth daily.  MULTIVITS,STRESS FORMULA (STRESSTABS PO) Take  by mouth daily.          Past History     Past Medical History:  Past Medical History:   Diagnosis Date    Arrhythmia     palpitations  heart murmur    Depression     Headache(784.0)     UTI (urinary tract infection)        Past Surgical History:  Past Surgical History:   Procedure Laterality Date    HX APPENDECTOMY      HX GYN  1989    partial hysterectomy     HX ORTHOPAEDIC  2007    lumbar fusion     HX TONSILLECTOMY         Family History:  Family History   Problem Relation Age of Onset    Hypertension Mother     Diabetes Mother     Coronary Artery Disease Other grandmother    Cancer Other      breast    Hypertension Father     Diabetes Father        Social History:  Social History   Substance Use Topics    Smoking status: Current Every Day Smoker     Packs/day: 0.50     Types: Cigarettes    Smokeless tobacco: Never Used    Alcohol use No       Allergies: Allergies   Allergen Reactions    Dilaudid [Hydromorphone (Bulk)] Palpitations     tachycardia    Augmentin [Amoxicillin-Pot Clavulanate] Nausea and Vomiting    Aspirin Other (comments)     tinnitus    Ceclor [Cefaclor] Other (comments)     Patient not sure of reaction    Codeine Other (comments)     GI         Review of Systems     Review of Systems   Constitutional: Positive for appetite change and unexpected weight change. Gastrointestinal: Positive for diarrhea. Musculoskeletal: Positive for neck pain. Neurological: Positive for tremors and headaches. Psychiatric/Behavioral: Positive for sleep disturbance. All other systems reviewed and are negative. Physical Exam     Visit Vitals    /75 (BP 1 Location: Left arm, BP Patient Position: At rest;Sitting)    Pulse 80    Temp 98 °F (36.7 °C)    Resp 14    Ht 5' 1\" (1.549 m)    Wt 53.1 kg (117 lb)    SpO2 100%    BMI 22.11 kg/m2       Physical Exam   Constitutional: She is oriented to person, place, and time. She appears well-developed. HENT:   Head: Normocephalic and atraumatic. Eyes: EOM are normal. Pupils are equal, round, and reactive to light. Neck: Normal range of motion. Neck supple. Cardiovascular: Normal rate, regular rhythm and normal heart sounds. Exam reveals no friction rub. No murmur heard. Pulmonary/Chest: Effort normal and breath sounds normal. No respiratory distress. She has no wheezes. Abdominal: Soft. She exhibits no distension. There is no tenderness. There is no rebound and no guarding. Musculoskeletal: Normal range of motion.    Neurological: She is alert and oriented to person, place, and time.   Skin: Skin is warm and dry. Psychiatric: She has a normal mood and affect. Her behavior is normal. Thought content normal.         Diagnostic Study Results         Medical Decision Making     1. N/v/d secodary to benzo withrdrawal; taper done by psychiatrist.   2. HypoK; repleate and recheck; May need admission; turned over to dr. Naz Frye pending dispo. Diagnosis     No diagnosis found. _______________________________    Attestations:  Scribe Attestation     Anne Marie Saenz acting as a scribe for and in the presence of Dorie Prakash MD      August 25, 2018 at 3:10 PM       Provider Attestation:      I personally performed the services described in the documentation, reviewed the documentation, as recorded by the scribe in my presence, and it accurately and completely records my words and actions.  August 25, 2018 at 3:10 PM - Dorie Prakash MD    _______________________________

## 2018-08-26 NOTE — DISCHARGE INSTRUCTIONS
Hypokalemia: Care Instructions  Your Care Instructions    Hypokalemia (say \"rm-ob-ikn-YULISA-adryan-uh\") is a low level of potassium. The heart, muscles, kidneys, and nervous system all need potassium to work well. This problem has many different causes. Kidney problems, diet, and medicines like diuretics and laxatives can cause it. So can vomiting or diarrhea. In some cases, cancer is the cause. Your doctor may do tests to find the cause of your low potassium levels. You may need medicines to bring your potassium levels back to normal. You may also need regular blood tests to check your potassium. If you have very low potassium, you may need intravenous (IV) medicines. You also may need tests to check the electrical activity of your heart. Heart problems caused by low potassium levels can be very serious. Follow-up care is a key part of your treatment and safety. Be sure to make and go to all appointments, and call your doctor if you are having problems. It's also a good idea to know your test results and keep a list of the medicines you take. How can you care for yourself at home? · If your doctor recommends it, eat foods that have a lot of potassium. These include fresh fruits, juices, and vegetables. They also include nuts, beans, and milk. · Be safe with medicines. If your doctor prescribes medicines or potassium supplements, take them exactly as directed. Call your doctor if you have any problems with your medicines. · Get your potassium levels tested as often as your doctor tells you. When should you call for help? Call 911 anytime you think you may need emergency care. For example, call if:    · You feel like your heart is missing beats. Heart problems caused by low potassium can cause death.     · You passed out (lost consciousness).     · You have a seizure.    Call your doctor now or seek immediate medical care if:    · You feel weak or unusually tired.     · You have severe arm or leg cramps.   · You have tingling or numbness.     · You feel sick to your stomach, or you vomit.     · You have belly cramps.     · You feel bloated or constipated.     · You have to urinate a lot.     · You feel very thirsty most of the time.     · You are dizzy or lightheaded, or you feel like you may faint.     · You feel depressed, or you lose touch with reality.    Watch closely for changes in your health, and be sure to contact your doctor if:    · You do not get better as expected. Where can you learn more? Go to http://saray-agustín.info/. Enter G358 in the search box to learn more about \"Hypokalemia: Care Instructions. \"  Current as of: May 12, 2017  Content Version: 11.7  © 6984-6716 [employer]. Care instructions adapted under license by [employer] (which disclaims liability or warranty for this information). If you have questions about a medical condition or this instruction, always ask your healthcare professional. [address] any warranty or liability for your use of this information.

## 2018-08-26 NOTE — ED NOTES
Pt requesting discharge. Informed physician in with pt and with talk with physician as soon as he is available. Verbalized understanding.

## 2018-08-26 NOTE — ED NOTES
Discharge and f/u instructions given to pt. Verbalized understanding. Pt ambulated out of ED with family.

## 2018-08-26 NOTE — ED NOTES
7:00 PM :Pt care assumed from Dr. Sadie Buckner , ED provider. Pt complaint(s), current treatment plan, progression and available diagnostic results have been discussed thoroughly. Rounding occurred: yes  Intended Disposition: Home   Pending diagnostic reports and/or labs (please list): Repeat BMP following potassium administration    9:39 PM - Potassium of 3.2 grew from 2.5. Patient is feeling improved enough for discharge. Scribe Attestation     Audra Umaña acting as a scribe for and in the presence of Peggy Wolff MD      August 25, 2018 at 9:40 PM       Provider Attestation:      I personally performed the services described in the documentation, reviewed the documentation, as recorded by the scribe in my presence, and it accurately and completely records my words and actions.  August 25, 2018 at 9:40 PM - Peggy Wolff MD

## 2018-08-28 ENCOUNTER — DOCUMENTATION ONLY (OUTPATIENT)
Dept: PAIN MANAGEMENT | Age: 58
End: 2018-08-28

## 2018-08-28 ENCOUNTER — TELEPHONE (OUTPATIENT)
Dept: PAIN MANAGEMENT | Age: 58
End: 2018-08-28

## 2018-08-28 NOTE — TELEPHONE ENCOUNTER
Patient called for refill of medication [id number] 3:02pm    1. Name, verified  2. Medicine requested -oxycodone hcl 15 mg  3.  [phone]  4. Percentage of pain relief while on medicine - 40  5. Patient able to do ADL - yes  6.   Any side effects from medicine 0 none

## 2018-08-28 NOTE — TELEPHONE ENCOUNTER
Abrahamsalinassarah Chan has called requesting a refill of their controlled medication, Oxycodone 15 mg, for the management of Chronic midline low back pain with sciatica, sciatica laterality unspecified. Last office visit date: 7/2/18    Date last  was pulled and reviewed : 8/28/18 and compliant. Last filled 8/4/18    Was the patient compliant when the above report was pulled? yes    Analgesia: Patient report 40% of pain relief with current medication regimen    Aberrancies: No aberranices in the last 30 days. ADL's: Patient report she is able to do basic ADL's at home. Adverse Reaction:Patient report no adverse reaction. Provider's last note and plan of care reviewed? yes  Request forwarded to provider for review. Provider was made aware.

## 2018-09-27 ENCOUNTER — OFFICE VISIT (OUTPATIENT)
Dept: PAIN MANAGEMENT | Age: 58
End: 2018-09-27

## 2018-09-27 VITALS
BODY MASS INDEX: 21.52 KG/M2 | WEIGHT: 114 LBS | SYSTOLIC BLOOD PRESSURE: 105 MMHG | TEMPERATURE: 98.1 F | HEART RATE: 70 BPM | RESPIRATION RATE: 13 BRPM | DIASTOLIC BLOOD PRESSURE: 65 MMHG | HEIGHT: 61 IN

## 2018-09-27 DIAGNOSIS — M47.816 SPONDYLOSIS OF LUMBAR REGION WITHOUT MYELOPATHY OR RADICULOPATHY: ICD-10-CM

## 2018-09-27 DIAGNOSIS — G89.29 CHRONIC MIDLINE LOW BACK PAIN WITH SCIATICA, SCIATICA LATERALITY UNSPECIFIED: ICD-10-CM

## 2018-09-27 DIAGNOSIS — M54.12 BRACHIAL NEURITIS OR RADICULITIS: ICD-10-CM

## 2018-09-27 DIAGNOSIS — M47.817 LUMBOSACRAL SPONDYLOSIS WITHOUT MYELOPATHY: ICD-10-CM

## 2018-09-27 DIAGNOSIS — M54.2 CERVICALGIA: ICD-10-CM

## 2018-09-27 DIAGNOSIS — M25.561 BILATERAL CHRONIC KNEE PAIN: ICD-10-CM

## 2018-09-27 DIAGNOSIS — M51.36 LUMBAR DEGENERATIVE DISC DISEASE: ICD-10-CM

## 2018-09-27 DIAGNOSIS — G89.4 CHRONIC PAIN SYNDROME: ICD-10-CM

## 2018-09-27 DIAGNOSIS — M22.40 CHONDROMALACIA OF PATELLA, UNSPECIFIED LATERALITY: ICD-10-CM

## 2018-09-27 DIAGNOSIS — G89.29 BILATERAL CHRONIC KNEE PAIN: ICD-10-CM

## 2018-09-27 DIAGNOSIS — M54.40 CHRONIC MIDLINE LOW BACK PAIN WITH SCIATICA, SCIATICA LATERALITY UNSPECIFIED: ICD-10-CM

## 2018-09-27 DIAGNOSIS — M96.1 POSTLAMINECTOMY SYNDROME, LUMBAR REGION: ICD-10-CM

## 2018-09-27 DIAGNOSIS — M54.16 LUMBAR RADICULOPATHY: ICD-10-CM

## 2018-09-27 DIAGNOSIS — M25.562 BILATERAL CHRONIC KNEE PAIN: ICD-10-CM

## 2018-09-27 RX ORDER — OXYCODONE HYDROCHLORIDE 15 MG/1
15 TABLET ORAL
Qty: 90 TAB | Refills: 0 | Status: SHIPPED | OUTPATIENT
Start: 2018-11-04 | End: 2018-09-27 | Stop reason: CLARIF

## 2018-09-27 RX ORDER — OXYCODONE HYDROCHLORIDE 15 MG/1
15 TABLET ORAL
Qty: 100 TAB | Refills: 0 | Status: SHIPPED | OUTPATIENT
Start: 2018-10-05 | End: 2018-11-04

## 2018-09-27 RX ORDER — OXYCODONE HYDROCHLORIDE 15 MG/1
15 TABLET ORAL
Qty: 90 TAB | Refills: 0 | Status: SHIPPED | OUTPATIENT
Start: 2018-11-04 | End: 2018-12-04

## 2018-09-27 RX ORDER — OXYCODONE HYDROCHLORIDE 15 MG/1
15 TABLET ORAL
Qty: 85 TAB | Refills: 0 | Status: SHIPPED | OUTPATIENT
Start: 2018-12-03 | End: 2019-01-02

## 2018-09-27 NOTE — PATIENT INSTRUCTIONS
1. Modify current plan with no evidence of addiction or diversion. Stable on current medication without adverse events. 2. Refill and adjust oxycodone 20 mg up to 4 times daily as needed no more than 100/month x 1 month, then adjust down to no more than 90/month x 1 month, then adjust down to no more than number 85/month until next visit. 3. Naloxone 4 mg nasal spray for opioid induced respiratory depression emergency only. 4. Discussed risks of addiction, dependency, and opioid induced hyperalgesia. 5. Please remember to call at least 5 business days prior to your medication refill. 6. Return to clinic in 3 months with Dr. Angie Friedman. Please call and cancel your appointment and pain management agreement if you do decide to transfer your care.

## 2018-09-27 NOTE — PROGRESS NOTES
Nursing Notes    Patient presents to the office today in follow-up. Patient rates her pain at 7/10 on the numerical pain scale. Reviewed medications with counts as follows:    Rx Date filled Qty Dispensed Pill Count Last Dose Short   Oxycodone 15 mg  09/06/16 100 39 today no                                      Last opioid agreement 09/27/18  Last urine drug screen 07/02/18    Comments:     POC UDS was not performed in office today    Any new labs or imaging since last appointment? NO    Have you been to an emergency room (ER) or urgent care clinic since your last visit? NO            Have you been hospitalized since your last visit? NO     If yes, where, when, and reason for visit? Have you seen or consulted any other health care providers outside of the [facility]  since your last visit? YES     If yes, where, when, and reason for visit? psychiatry  Ms. Helton has a reminder for a \"due or due soon\" health maintenance. I have asked that she contact her primary care provider for follow-up on this health maintenance.     PHQ over the last two weeks 9/27/2018   PHQ Not Done -   Little interest or pleasure in doing things Several days   Feeling down, depressed, irritable, or hopeless Several days   Total Score PHQ 2 2   Trouble falling or staying asleep, or sleeping too much Several days   Feeling tired or having little energy Several days   Poor appetite, weight loss, or overeating Several days   Feeling bad about yourself - or that you are a failure or have let yourself or your family down Several days   Trouble concentrating on things such as school, work, reading, or watching TV Several days   Moving or speaking so slowly that other people could have noticed; or the opposite being so fidgety that others notice Not at all   Thoughts of being better off dead, or hurting yourself in some way Not at all   PHQ 9 Score 7   How difficult have these problems made it for you to do your work, take care of your home and get along with others Somewhat difficult     Pt states she sees her psychiatrist on a regular basis and does not feel depressed at the moment.  Provider made aware

## 2018-09-27 NOTE — PROGRESS NOTES
HISTORY OF PRESENT ILLNESS  Kathie Luther is a 60 y.o. female    HPI: Ms. Ronel Luther  returns today for f/u of  chronic, severe pain involving the lumbar spine with radiation down the posterior left greater than right lower extremities to the mid calf associated with numbness and tingling. She suffers from possible rheumatoid arthritis as well as a post lumbar laminectomy pain syndrome and cervical and lumbar degenerative disc disease. Ms. Ronel Luther continues unchanged since last visit. She continues to do well with her current treatment plan which has been offering significant pain control. She reports no significant changes since her last visit. We did have a lengthy conversation previously regarding changes to our practice. We will continue tapering her oxycodone as previously discussed. Please see tapering plan below. We did have a lengthy conversation regarding her treatment plan and current condition. I explained to her that we will continue to develop a comprehensive and conservative treatment plan moving forward. She is very nervous about continuing to reduce her medications at this time but is trying to keep an open mind. I will have her follow-up in 3 months with Dr. Jayson Galicia for further evaluation and recommendation. She does report that she has continue tapering Klonopin from her psychiatrist and is no longer using Soma as well. Current medication management includes oxycodone 15 mg 4 times daily as needed. She also receives Topamax from her neurologist.  Haldol and Zoloft from her psychiatrist.  Medications are helping with pain control and quality of life. Her pain is 7-8/10 with medication and 9/10 without. Pt describes pain as sharp. Aggravating factors include standing, bending, and walking. Relieved with rest, medication, and avoiding painful activities. Current treatment is helping to improve general activity, mood, sleep, and enjoyment of life    Ms. Helton is tolerating medications well, with no side effects noted. She is able to stay more active with less discomfort with these current doses. The patient reports an average of 20% pain relief with current treatment/medications. She is informed of side effects, risks, and benefits of this regimen, and emphasizes that she derives a significant improvement in functionality and quality of life, and notes that non-opioid medications and therapies in the past have not offered significant benefit. She  is otherwise doing well with no other complaints today. She denies any adverse events including nausea, vomiting, dizziness, increased constipation, hallucinations, or seizures. Because the patient's current regimen places him/her at increased risk for possible overdose, a prescription for naloxone nasal spray has been provided. The patient understands that this medication is only to be used in the setting of a possible overdose and that inadvertent use of this medication could precipitate overt withdrawal.    MME: 120    Last UDS reviewed. Allergies   Allergen Reactions    Dilaudid [Hydromorphone (Bulk)] Palpitations     tachycardia    Augmentin [Amoxicillin-Pot Clavulanate] Nausea and Vomiting    Aspirin Other (comments)     tinnitus    Ceclor [Cefaclor] Other (comments)     Patient not sure of reaction    Codeine Other (comments)     GI       Past Surgical History:   Procedure Laterality Date    HX APPENDECTOMY      HX GYN  1989    partial hysterectomy     HX ORTHOPAEDIC  2007    lumbar fusion     HX TONSILLECTOMY           Review of Systems   Constitutional: Negative for chills, fever and weight loss. HENT: Negative for congestion and sore throat. Eyes: Negative for blurred vision and double vision. Respiratory: Negative for cough, shortness of breath and wheezing. Cardiovascular: Positive for palpitations. Negative for chest pain.    Gastrointestinal: Negative for abdominal pain, constipation, diarrhea, heartburn, nausea and vomiting. Genitourinary: Negative. Musculoskeletal: Positive for back pain and neck pain. Neurological: Negative for dizziness, seizures and loss of consciousness. Endo/Heme/Allergies: Does not bruise/bleed easily. Psychiatric/Behavioral: Positive for depression. The patient is nervous/anxious. The patient does not have insomnia. Physical Exam   Constitutional: She is oriented to person, place, and time and well-developed, well-nourished, and in no distress. No distress. HENT:   Head: Normocephalic and atraumatic. Eyes: EOM are normal.   Pulmonary/Chest: Effort normal.   Neurological: She is alert and oriented to person, place, and time. Skin: Skin is warm and dry. No rash noted. She is not diaphoretic. No erythema. Psychiatric: Mood, memory, affect and judgment normal.   Nursing note and vitals reviewed. ASSESSMENT:    1. Lumbar radiculopathy    2. Lumbosacral spondylosis without myelopathy    3. Chronic midline low back pain with sciatica, sciatica laterality unspecified    4. Postlaminectomy syndrome, lumbar region    5. Cervicalgia    6. Chronic pain syndrome    7. Brachial neuritis or radiculitis    8. Bilateral chronic knee pain    9. Chondromalacia of patella, unspecified laterality    10. Spondylosis of lumbar region without myelopathy or radiculopathy    11. Lumbar degenerative disc disease         [place] Prescription Monitoring Program was reviewed which does not demonstrate aberrancies and/or inconsistencies with regard to the historical prescribing of controlled medications to this patient by other providers. PLAN / Pt Instructions:  1. Modify current plan with no evidence of addiction or diversion. Stable on current medication without adverse events.     2. Refill and adjust oxycodone 20 mg up to 4 times daily as needed no more than 100/month x 1 month, then adjust down to no more than 90/month x 1 month, then adjust down to no more than number 85/month until next visit. 3. Naloxone 4 mg nasal spray for opioid induced respiratory depression emergency only. 4. Discussed risks of addiction, dependency, and opioid induced hyperalgesia. 5. Please remember to call at least 5 business days prior to your medication refill. 6. Return to clinic in 3 months with Dr. Rhona Singleton. Please call and cancel your appointment and pain management agreement if you do decide to transfer your care. Medications Ordered Today   Medications    oxyCODONE IR (OXY-IR) 15 mg immediate release tablet     Sig: Take 1 Tab by mouth four (4) times daily as needed for Pain for up to 30 days. Max Daily Amount: 60 mg. No more than #100 per month     Dispense:  100 Tab     Refill:  0    DISCONTD: oxyCODONE IR (OXY-IR) 15 mg immediate release tablet     Sig: Take 1 Tab by mouth three (3) times daily as needed for Pain for up to 30 days. Max Daily Amount: 45 mg. No more than #110 per month     Dispense:  90 Tab     Refill:  0    oxyCODONE IR (OXY-IR) 15 mg immediate release tablet     Sig: Take 1 Tab by mouth three (3) times daily as needed for Pain for up to 30 days. Max Daily Amount: 45 mg. Dispense:  85 Tab     Refill:  0    oxyCODONE IR (OXY-IR) 15 mg immediate release tablet     Sig: Take 1 Tab by mouth three (3) times daily as needed for Pain for up to 30 days. Max Daily Amount: 45 mg. Dispense:  90 Tab     Refill:  0         DISPOSITION     Pain medications are prescribed with the objective of pain relief and improved physical and psychosocial function in this patient.  Pain Meds and Quality Of Life have been reviewed. Nonpharmacologic therapy and non-opioid pharmacologic therapy have been considered. Opioid therapy is only prescribed if the expected benefits are anticipated to outweigh risks.  Counseled patient on proper use of prescribed medications.    Reviewed with patient self-help tools, home exercise, and lifestyle changes to assist the patient in self-management of symptoms.  Reviewed with patient the treatment plan, goals of treatment plan, and limitations of treatment plan, to include the potential for side effects from medications and procedures. If side effects occur, it is the responsibility of the patient to inform the clinic so that a change in the treatment plan can be made in a safe manner. The patient is advised that stopping prescribed medication may cause an increase in symptoms and possible medication withdrawal symptoms. The patient is informed an emergency room evaluation may be necessary if this occurs. Spent 25 minutes with patient today which more than 50% of that time was spent on counseling and coordination of care. Carol Silveirama 9/27/2018        Note: Please excuse any typographical errors. Voice recognition software was used for this note and may cause mistakes.

## 2018-09-27 NOTE — MR AVS SNAPSHOT
[address] [facility] [address] 
[phone] Patient: Sophia Marquez MRN: [id number] Visit Information Date & Time Provider Department Dept. Phone Encounter #  
 9/27/2018  2:40 PM Saba Roberson [address] for Pain Management [phone] Follow-up Instructions Return in about 3 months (around 12/27/2018). Your Appointments 12/17/2018  2:30 PM  
Office Visit [place] [address] [place] [address] for Pain Management (JOSEF SCHEDULING) Appt Note: Return in about 3 months (around 12/27/2018). 30 [facility] [id number]  
[phone] [facility] [id number] Upcoming Health Maintenance Date Due Hepatitis C Screening 1960 Pneumococcal 19-64 Medium Risk (1 of 1 - PPSV23) 7/11/1978 DTaP/Tdap/Td series (1 - Tdap) 7/11/1980 PAP AKA CERVICAL CYTOLOGY 7/11/1980 Shingrix Vaccine Age 50> (1 of 2) 7/11/2009 BREAST CANCER SCRN MAMMOGRAM 7/11/2009 FOBT Q 1 YEAR AGE 50-75 7/11/2009 Influenza Age 5 to Adult 8/1/2018 Allergies as of 9/27/2018  Review Complete On: 9/27/2018 By: Valerie Booker LPN Severity Noted Reaction Type Reactions Dilaudid [Hydromorphone (Bulk)] High 01/31/2017    Palpitations  
 tachycardia Augmentin [Amoxicillin-pot Clavulanate] Medium 01/31/2017    Nausea and Vomiting Aspirin    Other (comments)  
 tinnitus Ceclor [Cefaclor]  01/31/2017    Other (comments) Patient not sure of reaction Codeine    Other (comments) GI  
  
Current Immunizations  Never Reviewed No immunizations on file. Not reviewed this visit You Were Diagnosed With   
  
 Codes Comments Lumbar radiculopathy     ICD-10-CM: M54.16 
ICD-9-CM: 724.4 Lumbosacral spondylosis without myelopathy     ICD-10-CM: L39.541 ICD-9-CM: 721.3 Chronic midline low back pain with sciatica, sciatica laterality unspecified     ICD-10-CM: M54.40, G89.29 ICD-9-CM: 724.2, 724.3, 338.29 Postlaminectomy syndrome, lumbar region     ICD-10-CM: M96.1 ICD-9-CM: 722.83 Cervicalgia     ICD-10-CM: M54.2 ICD-9-CM: 723.1 Chronic pain syndrome     ICD-10-CM: G89.4 ICD-9-CM: 338.4 Brachial neuritis or radiculitis     ICD-10-CM: M54.12 
ICD-9-CM: 723.4 Bilateral chronic knee pain     ICD-10-CM: M25.561, M25.562, G89.29 ICD-9-CM: 719.46, 338.29 Chondromalacia of patella, unspecified laterality     ICD-10-CM: M22.40 ICD-9-CM: 717.7 Spondylosis of lumbar region without myelopathy or radiculopathy     ICD-10-CM: M47.816 ICD-9-CM: 721.3 Lumbar degenerative disc disease     ICD-10-CM: M51.36 
ICD-9-CM: 722.52 Vitals BP Pulse Temp Resp Height(growth percentile) Weight(growth percentile) 105/65 (BP 1 Location: Left arm, BP Patient Position: Sitting) 70 98.1 °F (36.7 °C) (Oral) 13 5' 1\" (1.549 m) 114 lb (51.7 kg) BMI OB Status Smoking Status 21.54 kg/m2 Hysterectomy Current Every Day Smoker Vitals History BMI and BSA Data Body Mass Index Body Surface Area  
 21.54 kg/m 2 1.49 m 2 Preferred Pharmacy Pharmacy Name Phone [facility] [address] [phone] Your Updated Medication List  
  
   
This list is accurate as of 9/27/18  3:37 PM.  Always use your most recent med list.  
  
  
  
  
 benzonatate 200 mg capsule Commonly known as:  TESSALON Take 200 mg by mouth three (3) times daily as needed for Cough. CRANBERRY PO Take 3,600 mg by mouth daily. dicyclomine 20 mg tablet Commonly known as:  BENTYL Take 20 mg by mouth every six (6) hours. DOCUSOFT S 100 mg capsule Generic drug:  docusate sodium Take 100 mg by mouth two (2) times a day. FLUoxetine 20 mg tablet Commonly known as:  PROzac Take 20 mg by mouth daily. gabapentin 600 mg tablet Commonly known as:  NEURONTIN  
1 po bid x 14 days, then 1 po tid  Indications: NEUROPATHIC PAIN  
  
 haloperidol 0.5 mg tablet Commonly known as:  HALDOL Take 0.5 mg by mouth. hydroCHLOROthiazide 12.5 mg tablet Commonly known as:  HYDRODIURIL Take 1 Tab by mouth daily. KlonoPIN 1 mg tablet Generic drug:  clonazePAM  
Take 1 mg by mouth three (3) times daily. linaclotide 145 mcg Cap capsule Commonly known as:  Rome Iba Take  by mouth Daily (before breakfast). mirtazapine 45 mg tablet Commonly known as:  Kathy Deeds Take 45 mg by mouth nightly. OTHER Take 1 Tab by mouth daily. I cool tabs * oxyCODONE IR 15 mg immediate release tablet Commonly known as:  OXY-IR Take 1 Tab by mouth four (4) times daily as needed for Pain for up to 30 days. Max Daily Amount: 60 mg. No more than #100 per month Start taking on:  10/5/2018 * oxyCODONE IR 15 mg immediate release tablet Commonly known as:  OXY-IR Take 1 Tab by mouth three (3) times daily as needed for Pain for up to 30 days. Max Daily Amount: 45 mg. Start taking on:  11/4/2018 * oxyCODONE IR 15 mg immediate release tablet Commonly known as:  OXY-IR Take 1 Tab by mouth three (3) times daily as needed for Pain for up to 30 days. Max Daily Amount: 45 mg. Start taking on:  12/3/2018 potassium chloride SA 10 mEq capsule Commonly known as:  Valarie Buck Take 10 mEq by mouth two (2) times a day. solifenacin 10 mg tablet Commonly known as:  Flory Janes Take 10 mg by mouth daily. SOMA 350 mg tablet Generic drug:  carisoprodol Take 350 mg by mouth four (4) times daily. STRESSTABS PO Take  by mouth daily. TOPAMAX 50 mg tablet Generic drug:  topiramate Take 50 mg by mouth two (2) times a day. Pt takes 100 mg in AM and 150 mg at bedtime TOPROL XL 50 mg XL tablet Generic drug:  metoprolol succinate Take 50 mg by mouth daily. ZOLOFT 25 mg tablet Generic drug:  sertraline Take 100 mg by mouth two (2) times a day. * Notice: This list has 3 medication(s) that are the same as other medications prescribed for you. Read the directions carefully, and ask your doctor or other care provider to review them with you. Prescriptions Printed Refills  
 oxyCODONE IR (OXY-IR) 15 mg immediate release tablet 0 Starting on: 10/5/2018 Sig: Take 1 Tab by mouth four (4) times daily as needed for Pain for up to 30 days. Max Daily Amount: 60 mg. No more than #100 per month Class: Print Route: Oral  
 oxyCODONE IR (OXY-IR) 15 mg immediate release tablet 0 Starting on: 12/3/2018 Sig: Take 1 Tab by mouth three (3) times daily as needed for Pain for up to 30 days. Max Daily Amount: 45 mg.  
 Class: Print Route: Oral  
 oxyCODONE IR (OXY-IR) 15 mg immediate release tablet 0 Starting on: 11/4/2018 Sig: Take 1 Tab by mouth three (3) times daily as needed for Pain for up to 30 days. Max Daily Amount: 45 mg.  
 Class: Print Route: Oral  
  
Follow-up Instructions Return in about 3 months (around 12/27/2018). Patient Instructions 1. Modify current plan with no evidence of addiction or diversion. Stable on current medication without adverse events. 2. Refill and adjust oxycodone 20 mg up to 4 times daily as needed no more than 100/month x 1 month, then adjust down to no more than 90/month x 1 month, then adjust down to no more than number 85/month until next visit. 3. Naloxone 4 mg nasal spray for opioid induced respiratory depression emergency only. 4. Discussed risks of addiction, dependency, and opioid induced hyperalgesia. 5. Please remember to call at least 5 business days prior to your medication refill. 6. Return to clinic in 3 months with Dr. Rodríguez Pakrer.  Please call and cancel your appointment and pain management agreement if you do decide to transfer your care. Introducing [facility]! Dear Aria Spangler: 
Thank you for requesting a [employer] account. Our records indicate that you already have an active [employer] account. You can access your account anytime at [email or web address] Did you know that you can access your hospital and ER discharge instructions at any time in [employer]? You can also review all of your test results from your hospital stay or ER visit. Additional Information If you have questions, please visit the Frequently Asked Questions section of the [employer] website at [email or web address] Remember, [employer] is NOT to be used for urgent needs. For medical emergencies, dial 911. Now available from your iPhone and Android! Please provide this summary of care documentation to your next provider. Your primary care clinician is listed as Ann Mota. If you have any questions after today's visit, please call [phone].

## 2018-10-07 ENCOUNTER — HOSPITAL ENCOUNTER (EMERGENCY)
Age: 58
Discharge: HOME | End: 2018-10-07
Attending: EMERGENCY MEDICINE
Payer: MEDICAID

## 2018-10-07 ENCOUNTER — APPOINTMENT (OUTPATIENT)
Dept: GENERAL RADIOLOGY | Age: 58
End: 2018-10-07
Attending: EMERGENCY MEDICINE
Payer: MEDICAID

## 2018-10-07 VITALS
HEART RATE: 73 BPM | OXYGEN SATURATION: 99 % | SYSTOLIC BLOOD PRESSURE: 118 MMHG | TEMPERATURE: 97.5 F | RESPIRATION RATE: 13 BRPM | DIASTOLIC BLOOD PRESSURE: 70 MMHG | WEIGHT: 110.4 LBS | BODY MASS INDEX: 20.84 KG/M2 | HEIGHT: 61 IN

## 2018-10-07 DIAGNOSIS — F11.93 OPIATE WITHDRAWAL (HCC): Primary | ICD-10-CM

## 2018-10-07 LAB
ALBUMIN SERPL-MCNC: 4.2 G/DL (ref 3.4–5)
ALBUMIN/GLOB SERPL: 1 {RATIO} (ref 0.8–1.7)
ALP SERPL-CCNC: 90 U/L (ref 45–117)
ALT SERPL-CCNC: 24 U/L (ref 13–56)
AMPHET UR QL SCN: NEGATIVE
ANION GAP SERPL CALC-SCNC: 7 MMOL/L (ref 3–18)
AST SERPL-CCNC: 14 U/L (ref 15–37)
ATRIAL RATE: 80 BPM
BARBITURATES UR QL SCN: NEGATIVE
BASOPHILS # BLD: 0.1 K/UL (ref 0–0.1)
BASOPHILS NFR BLD: 0 % (ref 0–2)
BENZODIAZ UR QL: NEGATIVE
BILIRUB SERPL-MCNC: 0.2 MG/DL (ref 0.2–1)
BUN SERPL-MCNC: 6 MG/DL (ref 7–18)
BUN/CREAT SERPL: 8 (ref 12–20)
CALCIUM SERPL-MCNC: 9.4 MG/DL (ref 8.5–10.1)
CALCULATED P AXIS, ECG09: 28 DEGREES
CALCULATED R AXIS, ECG10: 49 DEGREES
CALCULATED T AXIS, ECG11: -50 DEGREES
CANNABINOIDS UR QL SCN: NEGATIVE
CHLORIDE SERPL-SCNC: 105 MMOL/L (ref 100–108)
CK MB CFR SERPL CALC: NORMAL % (ref 0–4)
CK MB SERPL-MCNC: <1 NG/ML (ref 5–25)
CK SERPL-CCNC: 44 U/L (ref 26–192)
CO2 SERPL-SCNC: 31 MMOL/L (ref 21–32)
COCAINE UR QL SCN: NEGATIVE
CREAT SERPL-MCNC: 0.78 MG/DL (ref 0.6–1.3)
DIAGNOSIS, 93000: NORMAL
DIFFERENTIAL METHOD BLD: ABNORMAL
EOSINOPHIL # BLD: 0.2 K/UL (ref 0–0.4)
EOSINOPHIL NFR BLD: 1 % (ref 0–5)
ERYTHROCYTE [DISTWIDTH] IN BLOOD BY AUTOMATED COUNT: 14.2 % (ref 11.6–14.5)
ETHANOL SERPL-MCNC: <3 MG/DL (ref 0–3)
GLOBULIN SER CALC-MCNC: 4.3 G/DL (ref 2–4)
GLUCOSE SERPL-MCNC: 122 MG/DL (ref 74–99)
HCT VFR BLD AUTO: 47 % (ref 35–45)
HDSCOM,HDSCOM: NORMAL
HGB BLD-MCNC: 16.7 G/DL (ref 12–16)
LYMPHOCYTES # BLD: 2.9 K/UL (ref 0.9–3.6)
LYMPHOCYTES NFR BLD: 21 % (ref 21–52)
MCH RBC QN AUTO: 31 PG (ref 24–34)
MCHC RBC AUTO-ENTMCNC: 35.5 G/DL (ref 31–37)
MCV RBC AUTO: 87.2 FL (ref 74–97)
METHADONE UR QL: NEGATIVE
MONOCYTES # BLD: 0.8 K/UL (ref 0.05–1.2)
MONOCYTES NFR BLD: 6 % (ref 3–10)
NEUTS SEG # BLD: 10.1 K/UL (ref 1.8–8)
NEUTS SEG NFR BLD: 72 % (ref 40–73)
OPIATES UR QL: NEGATIVE
P-R INTERVAL, ECG05: 114 MS
PCP UR QL: NEGATIVE
PLATELET # BLD AUTO: 475 K/UL (ref 135–420)
PMV BLD AUTO: 10.7 FL (ref 9.2–11.8)
POTASSIUM SERPL-SCNC: 2.8 MMOL/L (ref 3.5–5.5)
PROT SERPL-MCNC: 8.5 G/DL (ref 6.4–8.2)
Q-T INTERVAL, ECG07: 372 MS
QRS DURATION, ECG06: 74 MS
QTC CALCULATION (BEZET), ECG08: 429 MS
RBC # BLD AUTO: 5.39 M/UL (ref 4.2–5.3)
SODIUM SERPL-SCNC: 143 MMOL/L (ref 136–145)
TROPONIN I SERPL-MCNC: <0.02 NG/ML (ref 0–0.04)
VENTRICULAR RATE, ECG03: 80 BPM
WBC # BLD AUTO: 14 K/UL (ref 4.6–13.2)

## 2018-10-07 PROCEDURE — 85025 COMPLETE CBC W/AUTO DIFF WBC: CPT | Performed by: EMERGENCY MEDICINE

## 2018-10-07 PROCEDURE — 71045 X-RAY EXAM CHEST 1 VIEW: CPT

## 2018-10-07 PROCEDURE — 93005 ELECTROCARDIOGRAM TRACING: CPT

## 2018-10-07 PROCEDURE — 96365 THER/PROPH/DIAG IV INF INIT: CPT

## 2018-10-07 PROCEDURE — 80307 DRUG TEST PRSMV CHEM ANLYZR: CPT | Performed by: EMERGENCY MEDICINE

## 2018-10-07 PROCEDURE — 96375 TX/PRO/DX INJ NEW DRUG ADDON: CPT

## 2018-10-07 PROCEDURE — 99284 EMERGENCY DEPT VISIT MOD MDM: CPT

## 2018-10-07 PROCEDURE — 96361 HYDRATE IV INFUSION ADD-ON: CPT

## 2018-10-07 PROCEDURE — 82550 ASSAY OF CK (CPK): CPT | Performed by: EMERGENCY MEDICINE

## 2018-10-07 PROCEDURE — 74011250636 HC RX REV CODE- 250/636: Performed by: EMERGENCY MEDICINE

## 2018-10-07 PROCEDURE — 80053 COMPREHEN METABOLIC PANEL: CPT | Performed by: EMERGENCY MEDICINE

## 2018-10-07 PROCEDURE — 74011250637 HC RX REV CODE- 250/637: Performed by: EMERGENCY MEDICINE

## 2018-10-07 RX ORDER — ONDANSETRON 2 MG/ML
4 INJECTION INTRAMUSCULAR; INTRAVENOUS
Status: DISCONTINUED | OUTPATIENT
Start: 2018-10-07 | End: 2018-10-07 | Stop reason: HOSPADM

## 2018-10-07 RX ORDER — POTASSIUM CHLORIDE 7.45 MG/ML
10 INJECTION INTRAVENOUS ONCE
Status: COMPLETED | OUTPATIENT
Start: 2018-10-07 | End: 2018-10-07

## 2018-10-07 RX ORDER — POTASSIUM CHLORIDE 20 MEQ/1
40 TABLET, EXTENDED RELEASE ORAL
Status: COMPLETED | OUTPATIENT
Start: 2018-10-07 | End: 2018-10-07

## 2018-10-07 RX ORDER — ONDANSETRON HYDROCHLORIDE 4 MG/2ML
4 INJECTION, SOLUTION INTRAMUSCULAR; INTRAVENOUS
Status: COMPLETED | OUTPATIENT
Start: 2018-10-07 | End: 2018-10-07

## 2018-10-07 RX ADMIN — POTASSIUM CHLORIDE 40 MEQ: 20 TABLET, EXTENDED RELEASE ORAL at 14:47

## 2018-10-07 RX ADMIN — ONDANSETRON HYDROCHLORIDE 4 MG: 4 INJECTION, SOLUTION INTRAMUSCULAR; INTRAVENOUS at 14:11

## 2018-10-07 RX ADMIN — POTASSIUM CHLORIDE 10 MEQ: 7.46 INJECTION, SOLUTION INTRAVENOUS at 14:56

## 2018-10-07 RX ADMIN — SODIUM CHLORIDE 1000 ML: 900 INJECTION, SOLUTION INTRAVENOUS at 14:11

## 2018-10-07 NOTE — DISCHARGE INSTRUCTIONS
Opioid Withdrawal: Care Instructions  Your Care Instructions  Opioids are strong pain medicines. Examples include hydrocodone, oxycodone, fentanyl, and morphine. Heroin is an example of an illegal opioid. Your body gets used to this type of drug if you take it all the time. This is called being dependent on the drug. And when you stop taking it, you go through withdrawal.  Withdrawal symptoms can include nausea, sweating, chills, diarrhea, stomach cramps, and muscle aches. Withdrawal can last up to several weeks, depending on which drug you took and how long you took it. You may feel very ill, but you are probably not in medical danger. Withdrawal isn't easy, but there are things you can do to help you cope with the symptoms. You will feel a little bit better each day as your body adjusts and heals itself. Follow-up care is a key part of your treatment and safety. Be sure to make and go to all appointments, and call your doctor if you are having problems. It's also a good idea to know your test results and keep a list of the medicines you take. How can you care for yourself at home? · Your doctor may give you medicine to help you feel better. Read and follow all instructions on the label. · To help get through withdrawal, you can also:  ¨ Get plenty of rest.  ¨ Drink plenty of fluids. ¨ Stay active, but don't tire yourself. ¨ Eat a healthy diet. · Do not drink alcohol or take illegal drugs. · Do not take medications that make you tired, like sleeping pills or muscle relaxers. · Talk to your doctor about drug treatment programs to help you stay drug-free. · Talk to your doctor or pharmacist about having a naloxone rescue kit on hand. Remember after you stop taking an opioid, even for a short time, your body gets used to not having this type of drug. If you return to taking the same amount of an opioid as you did before you stopped, you could be at a higher risk for overdose.   When should you call for help? Call 911 anytime you think you may need emergency care. For example, call if:    · You feel you cannot stop from hurting yourself or someone else.   [facility] your doctor now or seek immediate medical care if:    · You have new or worse withdrawal symptoms that you can't manage at home, such as:  ¨ Stomach cramps. ¨ Vomiting. ¨ Diarrhea. ¨ Muscle aches. ¨ Sweating.    Watch closely for changes in your health, and be sure to contact your doctor if:    · You do not get better as expected. Where can you learn more? Go to http://saray-agustín.info/. Enter E242 in the search box to learn more about \"Opioid Withdrawal: Care Instructions. \"  Current as of: May 8, 2018  Content Version: 11.8  © 2774-1456 [employer]. Care instructions adapted under license by [employer] (which disclaims liability or warranty for this information). If you have questions about a medical condition or this instruction, always ask your healthcare professional. [address] any warranty or liability for your use of this information.

## 2018-10-07 NOTE — ED NOTES
I performed a brief evaluation, including history and physical, of the patient here in triage and I have determined that pt will need further treatment and evaluation from the main side ER physician. I have placed initial orders to help in expediting patients care. October 07, 2018 at 12:25 PM - KACIE Campbell There were no vitals taken for this visit.

## 2018-10-07 NOTE — ED PROVIDER NOTES
EMERGENCY DEPARTMENT HISTORY AND PHYSICAL EXAM 
 
2:02 PM 
 
 
Date: 10/7/2018 Patient Name: Mojgan Gray History of Presenting Illness Chief Complaint Patient presents with  Withdrawal  
 
 
 
History Provided By: Patient Chief Complaint: possible withdrawals Duration:  Hours Timing:  Acute Location: abdomen/head Quality: Aching Severity: Moderate Modifying Factors: none Associated Symptoms:  weight loss, N/V, abdominal pain, diarrhea, and HA Additional History (Context): Mojgan Gray is a 60 y.o. female with palpitations, HA, and chronic pain who presents with possible withdrawals from oxycodone. Has noticed some weight loss, N/V, abdominal pain, diarrhea, and HA that started today. Pt is in pain management and receives the oxycodone for her chronic back issues. The pt is prescribed the medications 4 times daily as needed but she usually took 3 a day. She has been trying ween her self off the medications by taking less of the oxycodone and also skipping some days. Her last dose of oxycodone was last night around 1130 pm. She is still nauseated while presenting but has only vomited once. Also notes it feel like her heart is racing but this not unusual and she has medication for this. Also has hx of HA which takes topamax for. Denies CP, SOB, fever, chills, back pain, neck pain, urinary sx, weakness, numbness, or any other associated sx. No other complaints or concerns. PCP: Santosh Cordova MD 
 
Current Facility-Administered Medications Medication Dose Route Frequency Provider Last Rate Last Dose  ondansetron (ZOFRAN) injection 4 mg  4 mg IntraVENous NOW Demetrio Pod Dre, DO      
 
Current Outpatient Prescriptions Medication Sig Dispense Refill  oxyCODONE IR (OXY-IR) 15 mg immediate release tablet Take 1 Tab by mouth four (4) times daily as needed for Pain for up to 30 days. Max Daily Amount: 60 mg.  No more than #100 per month 100 Tab 0  
  [START ON 12/3/2018] oxyCODONE IR (OXY-IR) 15 mg immediate release tablet Take 1 Tab by mouth three (3) times daily as needed for Pain for up to 30 days. Max Daily Amount: 45 mg. 85 Tab 0  
 [START ON 11/4/2018] oxyCODONE IR (OXY-IR) 15 mg immediate release tablet Take 1 Tab by mouth three (3) times daily as needed for Pain for up to 30 days. Max Daily Amount: 45 mg. 90 Tab 0  
 FLUoxetine (PROZAC) 20 mg tablet Take 20 mg by mouth daily.  solifenacin (VESICARE) 10 mg tablet Take 10 mg by mouth daily.  potassium chloride SA (MICRO-K) 10 mEq capsule Take 10 mEq by mouth two (2) times a day.  carisoprodol (SOMA) 350 mg tablet Take 350 mg by mouth four (4) times daily.  hydrochlorothiazide (HYDRODIURIL) 12.5 mg tablet Take 1 Tab by mouth daily. 30 Tab 5  
 linaclotide (LINZESS) 145 mcg cap capsule Take  by mouth Daily (before breakfast).  benzonatate (TESSALON) 200 mg capsule Take 200 mg by mouth three (3) times daily as needed for Cough.  topiramate (TOPAMAX) 50 mg tablet Take 50 mg by mouth two (2) times a day. Pt takes 100 mg in AM and 150 mg at bedtime  haloperidol (HALDOL) 0.5 mg tablet Take 0.5 mg by mouth.  dicyclomine (BENTYL) 20 mg tablet Take 20 mg by mouth every six (6) hours.  mirtazapine (REMERON) 45 mg tablet Take 45 mg by mouth nightly.  docusate sodium (DOCUSOFT S) 100 mg capsule Take 100 mg by mouth two (2) times a day.  sertraline (ZOLOFT) 25 mg tablet Take 100 mg by mouth two (2) times a day.  gabapentin (NEURONTIN) 600 mg tablet 1 po bid x 14 days, then 1 po tid  Indications: NEUROPATHIC PAIN (Patient taking differently: 300 mg two (2) times a day. 1 po bid x 14 days, then 1 po tid  Indications: NEUROPATHIC PAIN) 90 Tab 5  CRANBERRY EXTRACT (CRANBERRY PO) Take 3,600 mg by mouth daily.  clonazePAM (KLONOPIN) 1 mg tablet Take 1 mg by mouth three (3) times daily.  OTHER Take 1 Tab by mouth daily. I cool tabs  metoprolol-XL (TOPROL XL) 50 mg XL tablet Take 50 mg by mouth daily.  MULTIVITS,STRESS FORMULA (STRESSTABS PO) Take  by mouth daily. Past History Past Medical History: 
Past Medical History:  
Diagnosis Date  Arrhythmia   
 palpitations  heart murmur  Depression  Headache(784.0)  UTI (urinary tract infection) Past Surgical History: 
Past Surgical History:  
Procedure Laterality Date  HX APPENDECTOMY [address]  
 partial hysterectomy  HX ORTHOPAEDIC  2007  
 lumbar fusion  HX TONSILLECTOMY Family History: 
Family History Problem Relation Age of Onset  Hypertension Mother  Diabetes Mother  Coronary Artery Disease Other   
  grandmother  Cancer Other   
  breast  
 Hypertension Father  Diabetes Father Social History: 
Social History Substance Use Topics  Smoking status: Current Every Day Smoker Packs/day: 0.50 Types: Cigarettes  Smokeless tobacco: Never Used  Alcohol use No  
 
 
Allergies: Allergies Allergen Reactions  Dilaudid [Hydromorphone (Bulk)] Palpitations  
  tachycardia  Augmentin [Amoxicillin-Pot Clavulanate] Nausea and Vomiting  Aspirin Other (comments)  
  tinnitus  Ceclor [Cefaclor] Other (comments) Patient not sure of reaction  Codeine Other (comments) GI Review of Systems Review of Systems Constitutional: Positive for unexpected weight change. Negative for chills, fatigue and fever. HENT: Positive for dental problem. Negative for sore throat. Eyes: Negative. Respiratory: Negative for cough and shortness of breath. Cardiovascular: Negative for chest pain and palpitations. Gastrointestinal: Positive for abdominal pain, diarrhea, nausea and vomiting. Genitourinary: Negative for dysuria. Musculoskeletal: Negative. Skin: Negative. Neurological: Positive for headaches. Negative for dizziness, weakness and light-headedness. Psychiatric/Behavioral: Negative. All other systems reviewed and are negative. Physical Exam  
 
Visit Vitals  /70  Pulse 73  Temp 97.5 °F (36.4 °C)  Resp 13  Ht 5' 1\" (1.549 m)  Wt 50.1 kg (110 lb 6.4 oz)  SpO2 99%  BMI 20.86 kg/m2 Physical Exam  
Constitutional: She is oriented to person, place, and time. She appears well-developed and well-nourished. Non-toxic appearance. She does not have a sickly appearance. She does not appear ill. No distress. HENT:  
Head: Normocephalic and atraumatic. Mouth/Throat: Oropharynx is clear and moist. No oropharyngeal exudate. Eyes: Conjunctivae and EOM are normal. Pupils are equal, round, and reactive to light. No scleral icterus. Neck: Normal range of motion. Neck supple. No hepatojugular reflux and no JVD present. No tracheal deviation present. No thyromegaly present. Cardiovascular: Normal rate, regular rhythm, S1 normal, S2 normal, normal heart sounds, intact distal pulses and normal pulses. Exam reveals no gallop, no S3 and no S4. No murmur heard. Pulses: 
     Radial pulses are 2+ on the right side, and 2+ on the left side. Dorsalis pedis pulses are 2+ on the right side, and 2+ on the left side. Pulmonary/Chest: Effort normal and breath sounds normal. No respiratory distress. She has no decreased breath sounds. She has no wheezes. She has no rhonchi. She has no rales. Abdominal: Soft. Normal appearance and bowel sounds are normal. She exhibits no distension, no fluid wave, no ascites and no mass. There is no hepatosplenomegaly. There is generalized tenderness. There is no rigidity, no rebound, no guarding, no CVA tenderness, no tenderness at McBurney's point and negative Espinosa's sign. Musculoskeletal: Normal range of motion. She exhibits no edema or tenderness. Strength 4/5 throughout Lymphadenopathy:  
     Head (right side): No submental, no submandibular, no preauricular and no occipital adenopathy present. Head (left side): No submental, no submandibular, no preauricular and no occipital adenopathy present. She has no cervical adenopathy. Right: No supraclavicular adenopathy present. Left: No supraclavicular adenopathy present. Neurological: She is alert and oriented to person, place, and time. She has normal strength and normal reflexes. She is not disoriented. No cranial nerve deficit or sensory deficit. Coordination and gait normal. GCS eye subscore is 4. GCS verbal subscore is 5. GCS motor subscore is 6. Grossly intact Skin: Skin is warm, dry and intact. No rash noted. She is not diaphoretic. Psychiatric: She has a normal mood and affect. Her speech is normal and behavior is normal. Judgment and thought content normal. Cognition and memory are normal.  
Vitals reviewed. Diagnostic Study Results Labs - Recent Results (from the past 12 hour(s)) CBC WITH AUTOMATED DIFF Collection Time: 10/07/18  1:44 PM  
Result Value Ref Range WBC 14.0 (H) 4.6 - 13.2 K/uL  
 RBC 5.39 (H) 4.20 - 5.30 M/uL  
 HGB 16.7 (H) 12.0 - 16.0 g/dL HCT 47.0 (H) 35.0 - 45.0 % MCV 87.2 74.0 - 97.0 FL  
 MCH 31.0 24.0 - 34.0 PG  
 MCHC 35.5 31.0 - 37.0 g/dL  
 RDW 14.2 11.6 - 14.5 % PLATELET 891 (H) 992 - 420 K/uL MPV 10.7 9.2 - 11.8 FL  
 NEUTROPHILS 72 40 - 73 % LYMPHOCYTES 21 21 - 52 % MONOCYTES 6 3 - 10 % EOSINOPHILS 1 0 - 5 % BASOPHILS 0 0 - 2 %  
 ABS. NEUTROPHILS 10.1 (H) 1.8 - 8.0 K/UL  
 ABS. LYMPHOCYTES 2.9 0.9 - 3.6 K/UL  
 ABS. MONOCYTES 0.8 0.05 - 1.2 K/UL  
 ABS. EOSINOPHILS 0.2 0.0 - 0.4 K/UL  
 ABS. BASOPHILS 0.1 0.0 - 0.1 K/UL  
 DF AUTOMATED METABOLIC PANEL, COMPREHENSIVE Collection Time: 10/07/18  1:44 PM  
Result Value Ref Range Sodium 143 136 - 145 mmol/L Potassium 2.8 (LL) 3.5 - 5.5 mmol/L Chloride 105 100 - 108 mmol/L  
 CO2 31 21 - 32 mmol/L  Anion gap 7 3.0 - 18 mmol/L  
 Glucose 122 (H) 74 - 99 mg/dL BUN 6 (L) 7.0 - 18 MG/DL Creatinine 0.78 0.6 - 1.3 MG/DL  
 BUN/Creatinine ratio 8 (L) 12 - 20 GFR est AA >60 >60 ml/min/1.73m2 GFR est non-AA >60 >60 ml/min/1.73m2 Calcium 9.4 8.5 - 10.1 MG/DL Bilirubin, total 0.2 0.2 - 1.0 MG/DL  
 ALT (SGPT) 24 13 - 56 U/L  
 AST (SGOT) 14 (L) 15 - 37 U/L Alk. phosphatase 90 45 - 117 U/L Protein, total 8.5 (H) 6.4 - 8.2 g/dL Albumin 4.2 3.4 - 5.0 g/dL Globulin 4.3 (H) 2.0 - 4.0 g/dL A-G Ratio 1.0 0.8 - 1.7 CARDIAC PANEL,(CK, CKMB & TROPONIN) Collection Time: 10/07/18  1:44 PM  
Result Value Ref Range CK 44 26 - 192 U/L  
 CK - MB <1.0 <3.6 ng/ml CK-MB Index  0.0 - 4.0 % CALCULATION NOT PERFORMED WHEN RESULT IS BELOW LINEAR LIMIT Troponin-I, Qt. <0.02 0.0 - 0.045 NG/ML  
ETHYL ALCOHOL Collection Time: 10/07/18  1:44 PM  
Result Value Ref Range ALCOHOL(ETHYL),SERUM <3 0 - 3 MG/DL  
EKG, 12 LEAD, INITIAL Collection Time: 10/07/18  1:47 PM  
Result Value Ref Range Ventricular Rate 80 BPM  
 Atrial Rate 80 BPM  
 P-R Interval 114 ms QRS Duration 74 ms Q-T Interval 372 ms QTC Calculation (Bezet) 429 ms Calculated P Axis 28 degrees Calculated R Axis 49 degrees Calculated T Axis -50 degrees Diagnosis Normal sinus rhythm ST & T wave abnormality, consider inferior ischemia Abnormal ECG No previous ECGs available DRUG SCREEN, URINE Collection Time: 10/07/18  2:15 PM  
Result Value Ref Range BENZODIAZEPINES NEGATIVE  NEG    
 BARBITURATES NEGATIVE  NEG    
 THC (TH-CANNABINOL) NEGATIVE  NEG    
 OPIATES NEGATIVE  NEG    
 PCP(PHENCYCLIDINE) NEGATIVE  NEG    
 COCAINE NEGATIVE  NEG    
 AMPHETAMINES NEGATIVE  NEG METHADONE NEGATIVE  NEG HDSCOM (NOTE) Radiologic Studies -  
XR CHEST PORT Final Result Medical Decision Making Provider Notes (Medical Decision Making): MDM Number of Diagnoses or Management Options Opiate withdrawal ([facility] [place] [address]):  
 
 
I am the first provider for this patient. I reviewed the vital signs, available nursing notes, past medical history, past surgical history, family history and social history. Vital Signs-Reviewed the patient's vital signs. Records Reviewed: Nursing Notes and Old Medical Records (Time of Review: 2:02 PM) 
 
ED Course: Progress Notes, Reevaluation, and Consults: 
Labs essentially normal with the exception of WBC 14. potassium 2.8. trop neg. ETOH neg. UDS neg. Chest X-Ray showed No acute process. EKG showed NSR with rate of 80 bpm. With no ST elevations or depression and non specific T wave changes. 2:02 PM 10/7/2018  
 
2:03 PM, 10/7/2018 Consult:  Discussed care with mady ( crisis)  Standard discussion; including history of patients chief complaint, available diagnostic results, and treatment course. Will evaluate the pt. 
 
4:21 PM mady saw the pt in the ED and she is table to be discharged home. Diagnosis I have reassessed the patient. Patient is feeling better. Patient was discharged in stable condition. Patient is to return to emergency department if any new or worsening condition. Clinical Impression: 1. Opiate withdrawal ([facility] [place] [address] Disposition: home Follow-up Information Follow up With Details Comments Contact Info Taina Brooks MD Call in 2 days Follow up. [address] [facility] 
[phone] [facility] EMERGENCY DEPT  If symptoms worsen, As needed [address] [facility] 
[phone]  
  
  
 
_______________________________ Attestations: 
Scribe Attestation Precious Tadeo acting as a scribe for and in the presence of Debi Best, DO October 07, 2018 at 2:02 PM 
    
Provider Attestation:     
I personally performed the services described in the documentation, reviewed the documentation, as recorded by the scribe in my presence, and it accurately and completely records my words and actions. October 07, 2018 at 2:02 PM - Lio Valente, DO   
_______________________________

## 2018-10-08 NOTE — BSMART NOTE
Alert & O x 4, has been weaning herself off of prescribed oxycontin (pain management) Denies suicidal and homicidal ideation. States she thought she might need detox, does not want to be in the hospital. Seen by Polly Peralta, Peer , who has arranged to obtain more information about in & outpatient treatment services in [facility], closer to patient's home and is going to call her. Discussed with Erickson Pedersen, who had requested CD referral information. Discharged home per ED.

## 2018-10-15 ENCOUNTER — APPOINTMENT (OUTPATIENT)
Dept: GENERAL RADIOLOGY | Age: 58
End: 2018-10-15
Attending: EMERGENCY MEDICINE
Payer: MEDICAID

## 2018-10-15 ENCOUNTER — APPOINTMENT (OUTPATIENT)
Dept: CT IMAGING | Age: 58
End: 2018-10-15
Attending: EMERGENCY MEDICINE
Payer: MEDICAID

## 2018-10-15 ENCOUNTER — HOSPITAL ENCOUNTER (EMERGENCY)
Age: 58
Discharge: HOME | End: 2018-10-15
Attending: EMERGENCY MEDICINE
Payer: MEDICAID

## 2018-10-15 VITALS
OXYGEN SATURATION: 97 % | WEIGHT: 107 LBS | RESPIRATION RATE: 16 BRPM | HEIGHT: 61 IN | DIASTOLIC BLOOD PRESSURE: 69 MMHG | TEMPERATURE: 98 F | BODY MASS INDEX: 20.2 KG/M2 | SYSTOLIC BLOOD PRESSURE: 132 MMHG | HEART RATE: 70 BPM

## 2018-10-15 DIAGNOSIS — R10.84 ABDOMINAL PAIN, GENERALIZED: Primary | ICD-10-CM

## 2018-10-15 DIAGNOSIS — R11.2 NAUSEA AND VOMITING, INTRACTABILITY OF VOMITING NOT SPECIFIED, UNSPECIFIED VOMITING TYPE: ICD-10-CM

## 2018-10-15 LAB
ALBUMIN SERPL-MCNC: 4 G/DL (ref 3.4–5)
ALBUMIN/GLOB SERPL: 1 {RATIO} (ref 0.8–1.7)
ALP SERPL-CCNC: 87 U/L (ref 45–117)
ALT SERPL-CCNC: 43 U/L (ref 13–56)
ANION GAP SERPL CALC-SCNC: 7 MMOL/L (ref 3–18)
AST SERPL-CCNC: 30 U/L (ref 15–37)
ATRIAL RATE: 86 BPM
BASOPHILS # BLD: 0.1 K/UL (ref 0–0.1)
BASOPHILS NFR BLD: 1 % (ref 0–2)
BILIRUB SERPL-MCNC: 0.3 MG/DL (ref 0.2–1)
BUN SERPL-MCNC: 8 MG/DL (ref 7–18)
BUN/CREAT SERPL: 11 (ref 12–20)
CALCIUM SERPL-MCNC: 9.3 MG/DL (ref 8.5–10.1)
CALCULATED P AXIS, ECG09: 45 DEGREES
CALCULATED R AXIS, ECG10: 37 DEGREES
CALCULATED T AXIS, ECG11: -36 DEGREES
CHLORIDE SERPL-SCNC: 105 MMOL/L (ref 100–108)
CK MB CFR SERPL CALC: ABNORMAL % (ref 0–4)
CK MB SERPL-MCNC: <1 NG/ML (ref 5–25)
CK SERPL-CCNC: 23 U/L (ref 26–192)
CO2 SERPL-SCNC: 29 MMOL/L (ref 21–32)
CREAT SERPL-MCNC: 0.73 MG/DL (ref 0.6–1.3)
DIAGNOSIS, 93000: NORMAL
DIFFERENTIAL METHOD BLD: ABNORMAL
EOSINOPHIL # BLD: 0.2 K/UL (ref 0–0.4)
EOSINOPHIL NFR BLD: 2 % (ref 0–5)
ERYTHROCYTE [DISTWIDTH] IN BLOOD BY AUTOMATED COUNT: 14 % (ref 11.6–14.5)
GLOBULIN SER CALC-MCNC: 4.2 G/DL (ref 2–4)
GLUCOSE SERPL-MCNC: 107 MG/DL (ref 74–99)
HCT VFR BLD AUTO: 48.2 % (ref 35–45)
HGB BLD-MCNC: 17.1 G/DL (ref 12–16)
LYMPHOCYTES # BLD: 2.7 K/UL (ref 0.9–3.6)
LYMPHOCYTES NFR BLD: 26 % (ref 21–52)
MAGNESIUM SERPL-MCNC: 2.4 MG/DL (ref 1.6–2.6)
MCH RBC QN AUTO: 30.6 PG (ref 24–34)
MCHC RBC AUTO-ENTMCNC: 35.5 G/DL (ref 31–37)
MCV RBC AUTO: 86.2 FL (ref 74–97)
MONOCYTES # BLD: 1 K/UL (ref 0.05–1.2)
MONOCYTES NFR BLD: 9 % (ref 3–10)
NEUTS SEG # BLD: 6.7 K/UL (ref 1.8–8)
NEUTS SEG NFR BLD: 62 % (ref 40–73)
P-R INTERVAL, ECG05: 116 MS
PHOSPHATE SERPL-MCNC: 3.8 MG/DL (ref 2.5–4.9)
PLATELET # BLD AUTO: 452 K/UL (ref 135–420)
PMV BLD AUTO: 11.3 FL (ref 9.2–11.8)
POTASSIUM SERPL-SCNC: 3.5 MMOL/L (ref 3.5–5.5)
PROT SERPL-MCNC: 8.2 G/DL (ref 6.4–8.2)
Q-T INTERVAL, ECG07: 382 MS
QRS DURATION, ECG06: 68 MS
QTC CALCULATION (BEZET), ECG08: 457 MS
RBC # BLD AUTO: 5.59 M/UL (ref 4.2–5.3)
SODIUM SERPL-SCNC: 141 MMOL/L (ref 136–145)
TROPONIN I SERPL-MCNC: <0.02 NG/ML (ref 0–0.04)
VENTRICULAR RATE, ECG03: 86 BPM
WBC # BLD AUTO: 10.6 K/UL (ref 4.6–13.2)

## 2018-10-15 PROCEDURE — 96375 TX/PRO/DX INJ NEW DRUG ADDON: CPT

## 2018-10-15 PROCEDURE — 82553 CREATINE MB FRACTION: CPT | Performed by: EMERGENCY MEDICINE

## 2018-10-15 PROCEDURE — 74011250637 HC RX REV CODE- 250/637: Performed by: EMERGENCY MEDICINE

## 2018-10-15 PROCEDURE — 85025 COMPLETE CBC W/AUTO DIFF WBC: CPT | Performed by: EMERGENCY MEDICINE

## 2018-10-15 PROCEDURE — 71045 X-RAY EXAM CHEST 1 VIEW: CPT

## 2018-10-15 PROCEDURE — 84100 ASSAY OF PHOSPHORUS: CPT | Performed by: EMERGENCY MEDICINE

## 2018-10-15 PROCEDURE — 74011250636 HC RX REV CODE- 250/636: Performed by: EMERGENCY MEDICINE

## 2018-10-15 PROCEDURE — 83735 ASSAY OF MAGNESIUM: CPT | Performed by: EMERGENCY MEDICINE

## 2018-10-15 PROCEDURE — 99284 EMERGENCY DEPT VISIT MOD MDM: CPT

## 2018-10-15 PROCEDURE — 74011636320 HC RX REV CODE- 636/320: Performed by: EMERGENCY MEDICINE

## 2018-10-15 PROCEDURE — 96374 THER/PROPH/DIAG INJ IV PUSH: CPT

## 2018-10-15 PROCEDURE — 93005 ELECTROCARDIOGRAM TRACING: CPT

## 2018-10-15 PROCEDURE — 80053 COMPREHEN METABOLIC PANEL: CPT | Performed by: EMERGENCY MEDICINE

## 2018-10-15 PROCEDURE — 74177 CT ABD & PELVIS W/CONTRAST: CPT

## 2018-10-15 PROCEDURE — 74011000250 HC RX REV CODE- 250: Performed by: EMERGENCY MEDICINE

## 2018-10-15 RX ORDER — MAG HYDROX/ALUMINUM HYD/SIMETH 200-200-20
30 SUSPENSION, ORAL (FINAL DOSE FORM) ORAL
Status: DISCONTINUED | OUTPATIENT
Start: 2018-10-15 | End: 2018-10-15 | Stop reason: HOSPADM

## 2018-10-15 RX ORDER — LIDOCAINE HYDROCHLORIDE 20 MG/ML
15 SOLUTION OROPHARYNGEAL ONCE
Status: COMPLETED | OUTPATIENT
Start: 2018-10-15 | End: 2018-10-15

## 2018-10-15 RX ORDER — KETOROLAC TROMETHAMINE 30 MG/ML
15 INJECTION, SOLUTION INTRAMUSCULAR; INTRAVENOUS
Status: COMPLETED | OUTPATIENT
Start: 2018-10-15 | End: 2018-10-15

## 2018-10-15 RX ORDER — FAMOTIDINE 10 MG/ML
20 INJECTION INTRAVENOUS
Status: COMPLETED | OUTPATIENT
Start: 2018-10-15 | End: 2018-10-15

## 2018-10-15 RX ORDER — ONDANSETRON 4 MG/1
4 TABLET, ORALLY DISINTEGRATING ORAL
Qty: 10 TAB | Refills: 0 | Status: SHIPPED | OUTPATIENT
Start: 2018-10-15 | End: 2018-10-18

## 2018-10-15 RX ORDER — ONDANSETRON HYDROCHLORIDE 4 MG/2ML
4 INJECTION, SOLUTION INTRAMUSCULAR; INTRAVENOUS
Status: COMPLETED | OUTPATIENT
Start: 2018-10-15 | End: 2018-10-15

## 2018-10-15 RX ADMIN — ONDANSETRON HYDROCHLORIDE 4 MG: 4 INJECTION, SOLUTION INTRAMUSCULAR; INTRAVENOUS at 12:56

## 2018-10-15 RX ADMIN — IOPAMIDOL 75 ML: 612 INJECTION, SOLUTION INTRAVENOUS at 16:14

## 2018-10-15 RX ADMIN — LIDOCAINE HYDROCHLORIDE 15 ML: 20 SOLUTION ORAL; TOPICAL at 14:48

## 2018-10-15 RX ADMIN — KETOROLAC TROMETHAMINE 15 MG: 30 INJECTION, SOLUTION INTRAMUSCULAR at 14:49

## 2018-10-15 RX ADMIN — ALUMINUM HYDROXIDE, MAGNESIUM HYDROXIDE, AND SIMETHICONE 30 ML: 200; 200; 20 SUSPENSION ORAL at 14:48

## 2018-10-15 RX ADMIN — SODIUM CHLORIDE 1000 ML: 900 INJECTION, SOLUTION INTRAVENOUS at 14:26

## 2018-10-15 RX ADMIN — FAMOTIDINE 20 MG: 10 INJECTION INTRAVENOUS at 14:49

## 2018-10-15 NOTE — ED PROVIDER NOTES
EMERGENCY DEPARTMENT HISTORY AND PHYSICAL EXAM 
 
1:13 PM 
 
 
Date: 10/15/2018 Patient Name: Elvia Frias History of Presenting Illness Chief Complaint Patient presents with  Chest Pain  Abdominal Pain  Nausea  Vomiting  Diarrhea History Provided By: Patient Additional History (Context): Elvia Frias is a 60 y.o. female with a past medical history of arrhythmia who presents with c/o abdominal pain onset 2 days ago. Patient describes her pain as constant, located in the periumbilical region, rated 10 out of 10 in severity, and is exacerbated by palpation. Associated symptoms include nausea, vomiting 3x, yellowish diarrhea, cough with mucous, headache, and chest palpitations onset 1 day ago. She takes metoprolol and was not able to take it this morning. Patient smokes, does not drink alcohol, and does not take recreational drugs. PSHx include appendectomy. She states that she has a coloscopy appointment but was cancelled due to insurance issues. She has not had her flu shot this year. Patient denies recent sick contacts, fever, blood in vomit or stool, shortness of breath, chest pain, and any other associated symptoms or complaints. PCP: Leny Bowen MD 
 
Current Facility-Administered Medications Medication Dose Route Frequency Provider Last Rate Last Dose  alum-mag hydroxide-simeth (MYLANTA) oral suspension 30 mL  30 mL Oral Q4H PRN Kirill Arellano MD   30 mL at 10/15/18 1448 Current Outpatient Prescriptions Medication Sig Dispense Refill  ondansetron (ZOFRAN ODT) 4 mg disintegrating tablet Take 1 Tab by mouth every eight (8) hours as needed for Nausea for up to 3 days. 10 Tab 0  
 oxyCODONE IR (OXY-IR) 15 mg immediate release tablet Take 1 Tab by mouth four (4) times daily as needed for Pain for up to 30 days. Max Daily Amount: 60 mg.  No more than #100 per month 100 Tab 0  
 [START ON 12/3/2018] oxyCODONE IR (OXY-IR) 15 mg immediate release tablet Take 1 Tab by mouth three (3) times daily as needed for Pain for up to 30 days. Max Daily Amount: 45 mg. 85 Tab 0  
 [START ON 11/4/2018] oxyCODONE IR (OXY-IR) 15 mg immediate release tablet Take 1 Tab by mouth three (3) times daily as needed for Pain for up to 30 days. Max Daily Amount: 45 mg. 90 Tab 0  
 FLUoxetine (PROZAC) 20 mg tablet Take 20 mg by mouth daily.  solifenacin (VESICARE) 10 mg tablet Take 10 mg by mouth daily.  potassium chloride SA (MICRO-K) 10 mEq capsule Take 10 mEq by mouth two (2) times a day.  carisoprodol (SOMA) 350 mg tablet Take 350 mg by mouth four (4) times daily.  hydrochlorothiazide (HYDRODIURIL) 12.5 mg tablet Take 1 Tab by mouth daily. 30 Tab 5  
 linaclotide (LINZESS) 145 mcg cap capsule Take  by mouth Daily (before breakfast).  benzonatate (TESSALON) 200 mg capsule Take 200 mg by mouth three (3) times daily as needed for Cough.  topiramate (TOPAMAX) 50 mg tablet Take 50 mg by mouth two (2) times a day. Pt takes 100 mg in AM and 150 mg at bedtime  haloperidol (HALDOL) 0.5 mg tablet Take 0.5 mg by mouth.  dicyclomine (BENTYL) 20 mg tablet Take 20 mg by mouth every six (6) hours.  mirtazapine (REMERON) 45 mg tablet Take 45 mg by mouth nightly.  docusate sodium (DOCUSOFT S) 100 mg capsule Take 100 mg by mouth two (2) times a day.  sertraline (ZOLOFT) 25 mg tablet Take 100 mg by mouth two (2) times a day.  gabapentin (NEURONTIN) 600 mg tablet 1 po bid x 14 days, then 1 po tid  Indications: NEUROPATHIC PAIN (Patient taking differently: 300 mg two (2) times a day. 1 po bid x 14 days, then 1 po tid  Indications: NEUROPATHIC PAIN) 90 Tab 5  CRANBERRY EXTRACT (CRANBERRY PO) Take 3,600 mg by mouth daily.  clonazePAM (KLONOPIN) 1 mg tablet Take 1 mg by mouth three (3) times daily.  OTHER Take 1 Tab by mouth daily. I cool tabs  metoprolol-XL (TOPROL XL) 50 mg XL tablet Take 50 mg by mouth daily.  MULTIVITS,STRESS FORMULA (STRESSTABS PO) Take  by mouth daily. Past History Past Medical History: 
Past Medical History:  
Diagnosis Date  Arrhythmia   
 palpitations  heart murmur  Depression  Headache(784.0)  UTI (urinary tract infection) Past Surgical History: 
Past Surgical History:  
Procedure Laterality Date  HX APPENDECTOMY [address]  
 partial hysterectomy  HX ORTHOPAEDIC  2007  
 lumbar fusion  HX TONSILLECTOMY Family History: 
Family History Problem Relation Age of Onset  Hypertension Mother  Diabetes Mother  Coronary Artery Disease Other   
  grandmother  Cancer Other   
  breast  
 Hypertension Father  Diabetes Father Social History: 
Social History Substance Use Topics  Smoking status: Current Every Day Smoker Packs/day: 0.50 Types: Cigarettes  Smokeless tobacco: Never Used  Alcohol use No  
 
 
Allergies: Allergies Allergen Reactions  Dilaudid [Hydromorphone (Bulk)] Palpitations  
  tachycardia  Augmentin [Amoxicillin-Pot Clavulanate] Nausea and Vomiting  Aspirin Other (comments)  
  tinnitus  Ceclor [Cefaclor] Other (comments) Patient not sure of reaction  Codeine Other (comments) GI Review of Systems Review of Systems Constitutional: Negative for fever. Respiratory: Positive for cough (with mucous). Cardiovascular: Positive for palpitations. Gastrointestinal: Positive for abdominal pain, diarrhea, nausea and vomiting. Neurological: Positive for headaches. All other systems reviewed and are negative. Physical Exam  
 
Visit Vitals  /69  Pulse 70  Temp 98 °F (36.7 °C)  Resp 16  
 Ht 5' 1\" (1.549 m)  Wt 48.5 kg (107 lb)  SpO2 97%  BMI 20.22 kg/m2 Physical Exam  
Constitutional: She is oriented to person, place, and time. She appears well-developed and well-nourished. No distress.   
HENT:  
 Head: Normocephalic and atraumatic. Mouth/Throat: Mucous membranes are normal.  
Moist mucous membranes. Eyes: Pupils are equal, round, and reactive to light. Neck: Normal range of motion. Neck supple. Cardiovascular: Normal rate, regular rhythm, normal heart sounds and intact distal pulses. Exam reveals no gallop and no friction rub. No murmur heard. 2+ radial pulses bilaterally. No BLE edema. S3.  
Pulmonary/Chest: Effort normal and breath sounds normal. No respiratory distress. She has no wheezes. She has no rales. Clear lungs. Abdominal: Soft. She exhibits no distension. There is tenderness (periumbilical). No CVA tenderness. Hyperactive bowel sounds. Diffusely tender to palpation but soft. Musculoskeletal: Normal range of motion. Lymphadenopathy:  
No lymphadenopathy. Neurological: She is alert and oriented to person, place, and time. No cranial nerve deficit. Skin: Skin is warm and dry. Nursing note and vitals reviewed. Diagnostic Study Results Labs - Recent Results (from the past 12 hour(s)) EKG, 12 LEAD, INITIAL Collection Time: 10/15/18 12:36 PM  
Result Value Ref Range Ventricular Rate 86 BPM  
 Atrial Rate 86 BPM  
 P-R Interval 116 ms  
 QRS Duration 68 ms Q-T Interval 382 ms QTC Calculation (Bezet) 457 ms Calculated P Axis 45 degrees Calculated R Axis 37 degrees Calculated T Axis -36 degrees Diagnosis Normal sinus rhythm ST & T wave abnormality, consider inferior ischemia Abnormal ECG When compared with ECG of 07-OCT-2018 13:47, No significant change was found Confirmed by [facility] ([id number]) on 10/15/2018 1:36:36 PM 
  
CARDIAC PANEL,(CK, CKMB & TROPONIN) Collection Time: 10/15/18 12:44 PM  
Result Value Ref Range CK 23 (L) 26 - 192 U/L  
 CK - MB <1.0 <3.6 ng/ml CK-MB Index  0.0 - 4.0 % CALCULATION NOT PERFORMED WHEN RESULT IS BELOW LINEAR LIMIT  Troponin-I, Qt. <0.02 0.0 - 0.045 NG/ML  
 CBC WITH AUTOMATED DIFF Collection Time: 10/15/18 12:44 PM  
Result Value Ref Range WBC 10.6 4.6 - 13.2 K/uL  
 RBC 5.59 (H) 4.20 - 5.30 M/uL  
 HGB 17.1 (H) 12.0 - 16.0 g/dL HCT 48.2 (H) 35.0 - 45.0 % MCV 86.2 74.0 - 97.0 FL  
 MCH 30.6 24.0 - 34.0 PG  
 MCHC 35.5 31.0 - 37.0 g/dL  
 RDW 14.0 11.6 - 14.5 % PLATELET 860 (H) 485 - 420 K/uL MPV 11.3 9.2 - 11.8 FL  
 NEUTROPHILS 62 40 - 73 % LYMPHOCYTES 26 21 - 52 % MONOCYTES 9 3 - 10 % EOSINOPHILS 2 0 - 5 % BASOPHILS 1 0 - 2 %  
 ABS. NEUTROPHILS 6.7 1.8 - 8.0 K/UL  
 ABS. LYMPHOCYTES 2.7 0.9 - 3.6 K/UL  
 ABS. MONOCYTES 1.0 0.05 - 1.2 K/UL  
 ABS. EOSINOPHILS 0.2 0.0 - 0.4 K/UL  
 ABS. BASOPHILS 0.1 0.0 - 0.1 K/UL  
 DF AUTOMATED METABOLIC PANEL, COMPREHENSIVE Collection Time: 10/15/18 12:44 PM  
Result Value Ref Range Sodium 141 136 - 145 mmol/L Potassium 3.5 3.5 - 5.5 mmol/L Chloride 105 100 - 108 mmol/L  
 CO2 29 21 - 32 mmol/L Anion gap 7 3.0 - 18 mmol/L Glucose 107 (H) 74 - 99 mg/dL BUN 8 7.0 - 18 MG/DL Creatinine 0.73 0.6 - 1.3 MG/DL  
 BUN/Creatinine ratio 11 (L) 12 - 20 GFR est AA >60 >60 ml/min/1.73m2 GFR est non-AA >60 >60 ml/min/1.73m2 Calcium 9.3 8.5 - 10.1 MG/DL Bilirubin, total 0.3 0.2 - 1.0 MG/DL  
 ALT (SGPT) 43 13 - 56 U/L  
 AST (SGOT) 30 15 - 37 U/L Alk. phosphatase 87 45 - 117 U/L Protein, total 8.2 6.4 - 8.2 g/dL Albumin 4.0 3.4 - 5.0 g/dL Globulin 4.2 (H) 2.0 - 4.0 g/dL A-G Ratio 1.0 0.8 - 1.7 MAGNESIUM Collection Time: 10/15/18 12:44 PM  
Result Value Ref Range Magnesium 2.4 1.6 - 2.6 mg/dL PHOSPHORUS Collection Time: 10/15/18 12:44 PM  
Result Value Ref Range Phosphorus 3.8 2.5 - 4.9 MG/DL Radiologic Studies -  
CT ABD PELV W CONT Final Result XR CHEST SNGL V Final Result Xr Chest Sngl V Result Date: 10/15/2018 EXAM: CHEST ONE VIEW  portable 1247 hours CLINICAL HISTORY/INDICATION: Onset of chest pain with palpitations day prior to arrival, associated with nausea, vomiting, and diarrhea COMPARISON: Chest x-ray October 7, 2018. TECHNIQUE: One view obtained. FINDINGS: The cardiac and mediastinal silhouette is normal. The lungs are clear. Pulmonary vascularity is normal. The costophrenic angles are sharply defined. No bony abnormalities are seen. IMPRESSION: Negative chest.  
 
Ct Abd Pelv W Cont Result Date: 10/15/2018 EXAM: CT scan of the abdomen and pelvis with contrast. CLINICAL HISTORY/INDICATION: Abdominal pain in a patient post appendectomy. COMPARISON: None. TECHNIQUE: All CT scans at this facility are performed using dose optimization technique as appropriate to a performed exam, to include automated exposure control, adjustment of the mA and/or kV according to patient's size (including appropriate matching for site-specific examinations), or use of iterative reconstruction technique. Sonido Rasmussen FINDINGS: A CT scan is performed on the patient's abdomen and pelvis. Multiple axial images are obtained from the dome of the diaphragm to the symphysis. Sagittal and coronal images are reconstructed. The lung bases appear clear. The attenuation coefficient of the liver, spleen, kidneys and pancreas appear uniform. The adrenal glands are seen bilaterally and appear normal. No renal calculi or obstructive uropathy is present. No high attenuation filling defects are seen in the contracted gallbladder. The aorta is normal in caliber. Clinical history indicates a hysterectomy and appendectomy. No dilated loops of bowel, free fluid or free air seen in the peritoneum. No abnormal fluid collections or soft tissue masses are noted. No evidence for pathologic lymphadenopathy is seen. The osseous structures show a laminectomy with pedicle screws and stabilizing hardware from L3 to L5. Sonido Inch IMPRESSION: No dilated loops of bowel, free fluid or free air in the peritoneum. No high attenuation filling defects in a contracted gallbladder. History of prior appendectomy and hysterectomy. No renal calculi or obstructive uropathy. No evidence for pathologic lymphadenopathy. Lumbar laminectomy with stabilizing hardware from L3 to L5. Eward Medicus 4:40 PM 
RADIOLOGY FINDINGS 
CT abdomen pelvis shows: no acute abnormalities. No SPO. No signs of pylo. No signs of pancreatitis. Pending review by Radiologist 
Recorded by Rach Diego ED Scribe, as dictated by Alexus Senior MD  
 
Medical Decision Making I am the first provider for this patient. I reviewed the vital signs, available nursing notes, past medical history, past surgical history, family history and social history. Vital Signs-Reviewed the patient's vital signs. Records Reviewed: Nursing Notes ED Course: Progress Notes, Reevaluation, and Consults: 
3:09 PM 
I offered shared decision making to the patient and mother about the CT scan, explaining reasons I did not think it was necessary. They both conferred and would like it to rule out any reason. This may be related to opioid withdrawal, and they state that this is different pain from prior visit 1wk ago and that she has been off narcs for 2wks now. Provider Notes (Medical Decision Making): MDM Number of Diagnoses or Management Options Abdominal pain, generalized:  
Nausea and vomiting, intractability of vomiting not specified, unspecified vomiting type:  
Diagnosis management comments: Diff dx: gastroenteritis, svt, afib Pt has sense of palpitations, also been feeling very poorly last few days with headache, vomiting, diarrhea, sounds most c/w gastroenteritis, mild diffuse ttp. Already had appendectomy, unlikely sbo based lack of ttp and large amt of BMs See above, using shared decision making, pt wanted ct abd/pelvis so agreed to r/o SBO or other pathology given prior abd surgery, CT completely benign. VS wnl, will PO challenge and d/c with few days zofran, pt comfortable w this plan. Safe for d/c, careful return precautions given. Pt/family comfortable with plan Capri Ordoñez MD 
 
 
 
 
Diagnosis Clinical Impression: 1. Abdominal pain, generalized 2. Nausea and vomiting, intractability of vomiting not specified, unspecified vomiting type Disposition: home Follow-up Information Follow up With Details Comments Contact Info Ric Neil MD In 3 days  [address] [facility] 
[phone] [address] [facility] EMERGENCY DEPT  As needed, If symptoms worsen [address] Patient's Medications Start Taking ONDANSETRON (ZOFRAN ODT) 4 MG DISINTEGRATING TABLET    Take 1 Tab by mouth every eight (8) hours as needed for Nausea for up to 3 days. Continue Taking BENZONATATE (TESSALON) 200 MG CAPSULE    Take 200 mg by mouth three (3) times daily as needed for Cough. CARISOPRODOL (SOMA) 350 MG TABLET    Take 350 mg by mouth four (4) times daily. CLONAZEPAM (KLONOPIN) 1 MG TABLET    Take 1 mg by mouth three (3) times daily. CRANBERRY EXTRACT (CRANBERRY PO)    Take 3,600 mg by mouth daily. DICYCLOMINE (BENTYL) 20 MG TABLET    Take 20 mg by mouth every six (6) hours. DOCUSATE SODIUM (DOCUSOFT S) 100 MG CAPSULE    Take 100 mg by mouth two (2) times a day. FLUOXETINE (PROZAC) 20 MG TABLET    Take 20 mg by mouth daily. GABAPENTIN (NEURONTIN) 600 MG TABLET    1 po bid x 14 days, then 1 po tid  Indications: NEUROPATHIC PAIN  
 HALOPERIDOL (HALDOL) 0.5 MG TABLET    Take 0.5 mg by mouth. HYDROCHLOROTHIAZIDE (HYDRODIURIL) 12.5 MG TABLET    Take 1 Tab by mouth daily. LINACLOTIDE (LINZESS) 145 MCG CAP CAPSULE    Take  by mouth Daily (before breakfast). METOPROLOL-XL (TOPROL XL) 50 MG XL TABLET    Take 50 mg by mouth daily. MIRTAZAPINE (REMERON) 45 MG TABLET    Take 45 mg by mouth nightly. MULTIVITS,STRESS FORMULA (STRESSTABS PO)    Take  by mouth daily. OTHER    Take 1 Tab by mouth daily. I cool tabs OXYCODONE IR (OXY-IR) 15 MG IMMEDIATE RELEASE TABLET    Take 1 Tab by mouth four (4) times daily as needed for Pain for up to 30 days. Max Daily Amount: 60 mg. No more than #100 per month OXYCODONE IR (OXY-IR) 15 MG IMMEDIATE RELEASE TABLET    Take 1 Tab by mouth three (3) times daily as needed for Pain for up to 30 days. Max Daily Amount: 45 mg. OXYCODONE IR (OXY-IR) 15 MG IMMEDIATE RELEASE TABLET    Take 1 Tab by mouth three (3) times daily as needed for Pain for up to 30 days. Max Daily Amount: 45 mg. POTASSIUM CHLORIDE SA (MICRO-K) 10 MEQ CAPSULE    Take 10 mEq by mouth two (2) times a day. SERTRALINE (ZOLOFT) 25 MG TABLET    Take 100 mg by mouth two (2) times a day. SOLIFENACIN (VESICARE) 10 MG TABLET    Take 10 mg by mouth daily. TOPIRAMATE (TOPAMAX) 50 MG TABLET    Take 50 mg by mouth two (2) times a day. Pt takes 100 mg in AM and 150 mg at bedtime These Medications have changed No medications on file Stop Taking No medications on file  
 
_______________________________ Attestations: 
Scribe Attestation Natalie Mcintosh acting as a scribe for and in the presence of Thompson Castañeda MD     
October 15, 2018 at 1:13 PM 
    
Provider Attestation:     
I personally performed the services described in the documentation, reviewed the documentation, as recorded by the scribe in my presence, and it accurately and completely records my words and actions. October 15, 2018 at 1:13 PM - Thompson Castañeda MD   
_______________________________

## 2018-10-15 NOTE — DISCHARGE INSTRUCTIONS
Nausea and Vomiting: Care Instructions  Your Care Instructions    When you are nauseated, you may feel weak and sweaty and notice a lot of saliva in your mouth. Nausea often leads to vomiting. Most of the time you do not need to worry about nausea and vomiting, but they can be signs of other illnesses. Two common causes of nausea and vomiting are stomach flu and food poisoning. Nausea and vomiting from viral stomach flu will usually start to improve within 24 hours. Nausea and vomiting from food poisoning may last from 12 to 48 hours. The doctor has checked you carefully, but problems can develop later. If you notice any problems or new symptoms, get medical treatment right away. Follow-up care is a key part of your treatment and safety. Be sure to make and go to all appointments, and call your doctor if you are having problems. It's also a good idea to know your test results and keep a list of the medicines you take. How can you care for yourself at home? · To prevent dehydration, drink plenty of fluids, enough so that your urine is light yellow or clear like water. Choose water and other caffeine-free clear liquids until you feel better. If you have kidney, heart, or liver disease and have to limit fluids, talk with your doctor before you increase the amount of fluids you drink. · Rest in bed until you feel better. · When you are able to eat, try clear soups, mild foods, and liquids until all symptoms are gone for 12 to 48 hours. Other good choices include dry toast, crackers, cooked cereal, and gelatin dessert, such as Jell-O. When should you call for help? Call 911 anytime you think you may need emergency care. For example, call if:    · You passed out (lost consciousness).    Call your doctor now or seek immediate medical care if:    · You have symptoms of dehydration, such as:  ¨ Dry eyes and a dry mouth. ¨ Passing only a little dark urine.   ¨ Feeling thirstier than usual.     · You have new or worsening belly pain.     · You have a new or higher fever.     · You vomit blood or what looks like coffee grounds.    Watch closely for changes in your health, and be sure to contact your doctor if:    · You have ongoing nausea and vomiting.     · Your vomiting is getting worse.     · Your vomiting lasts longer than 2 days.     · You are not getting better as expected. Where can you learn more? Go to http://saray-agustín.info/. Enter 25 [id number] in the search box to learn more about \"Nausea and Vomiting: Care Instructions. \"  Current as of: November 20, 2017  Content Version: 11.8  © 1456-1057 [employer]. Care instructions adapted under license by [employer] (which disclaims liability or warranty for this information). If you have questions about a medical condition or this instruction, always ask your healthcare professional. [address] any warranty or liability for your use of this information.

## 2018-10-15 NOTE — ED TRIAGE NOTES
Pt. States \"I'm having chest pains that started yesterday; my heart is racing with palpitations\". Pt. Also c/o N/V/D.

## 2019-07-22 ENCOUNTER — HOSPITAL ENCOUNTER (INPATIENT)
Age: 59
LOS: 7 days | Discharge: HOME | DRG: 751 | End: 2019-07-29
Attending: EMERGENCY MEDICINE | Admitting: PSYCHIATRY & NEUROLOGY
Payer: MEDICAID

## 2019-07-22 DIAGNOSIS — F41.1 GENERALIZED ANXIETY DISORDER: ICD-10-CM

## 2019-07-22 DIAGNOSIS — R44.0 AUDITORY HALLUCINATIONS: Primary | ICD-10-CM

## 2019-07-22 PROBLEM — F33.3 SEVERE RECURRENT DEPRESSION WITH PSYCHOSIS (HCC): Status: ACTIVE | Noted: 2019-07-22

## 2019-07-22 PROBLEM — F41.9 ANXIETY: Status: ACTIVE | Noted: 2019-07-22

## 2019-07-22 LAB
AMPHET UR QL SCN: NEGATIVE
ANION GAP SERPL CALC-SCNC: 5 MMOL/L (ref 3–18)
APAP SERPL-MCNC: <2 UG/ML (ref 10–30)
BARBITURATES UR QL SCN: NEGATIVE
BASOPHILS # BLD: 0.1 K/UL (ref 0–0.1)
BASOPHILS NFR BLD: 0 % (ref 0–2)
BENZODIAZ UR QL: NEGATIVE
BUN SERPL-MCNC: 10 MG/DL (ref 7–18)
BUN/CREAT SERPL: 16 (ref 12–20)
CALCIUM SERPL-MCNC: 9.3 MG/DL (ref 8.5–10.1)
CANNABINOIDS UR QL SCN: NEGATIVE
CHLORIDE SERPL-SCNC: 109 MMOL/L (ref 100–111)
CO2 SERPL-SCNC: 29 MMOL/L (ref 21–32)
COCAINE UR QL SCN: NEGATIVE
CREAT SERPL-MCNC: 0.62 MG/DL (ref 0.6–1.3)
DIFFERENTIAL METHOD BLD: ABNORMAL
EOSINOPHIL # BLD: 0.4 K/UL (ref 0–0.4)
EOSINOPHIL NFR BLD: 4 % (ref 0–5)
ERYTHROCYTE [DISTWIDTH] IN BLOOD BY AUTOMATED COUNT: 14.4 % (ref 11.6–14.5)
ETHANOL SERPL-MCNC: <3 MG/DL (ref 0–3)
GLUCOSE SERPL-MCNC: 100 MG/DL (ref 74–99)
HCG UR QL: NEGATIVE
HCT VFR BLD AUTO: 46.8 % (ref 35–45)
HDSCOM,HDSCOM: NORMAL
HGB BLD-MCNC: 16 G/DL (ref 12–16)
LYMPHOCYTES # BLD: 3 K/UL (ref 0.9–3.6)
LYMPHOCYTES NFR BLD: 26 % (ref 21–52)
MCH RBC QN AUTO: 30.3 PG (ref 24–34)
MCHC RBC AUTO-ENTMCNC: 34.2 G/DL (ref 31–37)
MCV RBC AUTO: 88.6 FL (ref 74–97)
METHADONE UR QL: NEGATIVE
MONOCYTES # BLD: 0.6 K/UL (ref 0.05–1.2)
MONOCYTES NFR BLD: 5 % (ref 3–10)
NEUTS SEG # BLD: 7.6 K/UL (ref 1.8–8)
NEUTS SEG NFR BLD: 65 % (ref 40–73)
OPIATES UR QL: NEGATIVE
PCP UR QL: NEGATIVE
PLATELET # BLD AUTO: 618 K/UL (ref 135–420)
PMV BLD AUTO: 10.7 FL (ref 9.2–11.8)
POTASSIUM SERPL-SCNC: 3.7 MMOL/L (ref 3.5–5.5)
RBC # BLD AUTO: 5.28 M/UL (ref 4.2–5.3)
SALICYLATES SERPL-MCNC: 2.9 MG/DL (ref 2.8–20)
SODIUM SERPL-SCNC: 143 MMOL/L (ref 136–145)
TSH SERPL DL<=0.05 MIU/L-ACNC: 1.44 UIU/ML (ref 0.36–3.74)
WBC # BLD AUTO: 11.7 K/UL (ref 4.6–13.2)

## 2019-07-22 PROCEDURE — 36415 COLL VENOUS BLD VENIPUNCTURE: CPT

## 2019-07-22 PROCEDURE — 81025 URINE PREGNANCY TEST: CPT

## 2019-07-22 PROCEDURE — 99285 EMERGENCY DEPT VISIT HI MDM: CPT

## 2019-07-22 PROCEDURE — 85025 COMPLETE CBC W/AUTO DIFF WBC: CPT

## 2019-07-22 PROCEDURE — 80307 DRUG TEST PRSMV CHEM ANLYZR: CPT

## 2019-07-22 PROCEDURE — 84443 ASSAY THYROID STIM HORMONE: CPT

## 2019-07-22 PROCEDURE — 65220000003 HC RM SEMIPRIVATE PSYCH

## 2019-07-22 PROCEDURE — 80048 BASIC METABOLIC PNL TOTAL CA: CPT

## 2019-07-22 PROCEDURE — 74011250637 HC RX REV CODE- 250/637: Performed by: PSYCHIATRY & NEUROLOGY

## 2019-07-22 RX ORDER — LORAZEPAM 1 MG/1
1 TABLET ORAL
Status: DISCONTINUED | OUTPATIENT
Start: 2019-07-22 | End: 2019-07-24

## 2019-07-22 RX ORDER — BENZTROPINE MESYLATE 1 MG/1
1-2 TABLET ORAL
Status: DISCONTINUED | OUTPATIENT
Start: 2019-07-22 | End: 2019-07-29 | Stop reason: HOSPADM

## 2019-07-22 RX ORDER — IBUPROFEN 200 MG
1 TABLET ORAL
Status: DISCONTINUED | OUTPATIENT
Start: 2019-07-22 | End: 2019-07-29 | Stop reason: HOSPADM

## 2019-07-22 RX ORDER — LORAZEPAM 2 MG/ML
2 INJECTION INTRAMUSCULAR
Status: DISCONTINUED | OUTPATIENT
Start: 2019-07-22 | End: 2019-07-29 | Stop reason: HOSPADM

## 2019-07-22 RX ORDER — HALOPERIDOL 5 MG/ML
5 INJECTION INTRAMUSCULAR
Status: DISCONTINUED | OUTPATIENT
Start: 2019-07-22 | End: 2019-07-29 | Stop reason: HOSPADM

## 2019-07-22 RX ORDER — BENZTROPINE MESYLATE 1 MG/ML
1-2 INJECTION INTRAMUSCULAR; INTRAVENOUS
Status: DISCONTINUED | OUTPATIENT
Start: 2019-07-22 | End: 2019-07-29 | Stop reason: HOSPADM

## 2019-07-22 RX ORDER — TRAZODONE HYDROCHLORIDE 50 MG/1
50 TABLET ORAL
Status: DISCONTINUED | OUTPATIENT
Start: 2019-07-22 | End: 2019-07-25

## 2019-07-22 RX ORDER — HALOPERIDOL 5 MG/1
5 TABLET ORAL
Status: DISCONTINUED | OUTPATIENT
Start: 2019-07-22 | End: 2019-07-29 | Stop reason: HOSPADM

## 2019-07-22 RX ORDER — LORAZEPAM 1 MG/1
2 TABLET ORAL
Status: DISCONTINUED | OUTPATIENT
Start: 2019-07-22 | End: 2019-07-24

## 2019-07-22 RX ORDER — LORAZEPAM 2 MG/ML
1 INJECTION INTRAMUSCULAR
Status: DISCONTINUED | OUTPATIENT
Start: 2019-07-22 | End: 2019-07-29 | Stop reason: HOSPADM

## 2019-07-22 RX ADMIN — LORAZEPAM 1 MG: 1 TABLET ORAL at 16:15

## 2019-07-22 RX ADMIN — TRAZODONE HYDROCHLORIDE 50 MG: 50 TABLET ORAL at 20:26

## 2019-07-22 RX ADMIN — LORAZEPAM 1 MG: 1 TABLET ORAL at 20:28

## 2019-07-22 NOTE — BSMART NOTE
Comprehensive Assessment Form Part 1     Section I - Disposition        The Medical Doctor is in agreement with Psychiatrist disposition because patient is hopeless, helpless, and a danger to self. The plan is admit to the adult Unit Rm 125-2. The on-call Psychiatrist consulted was Tosin Velasquez  The admitting Psychiatrist will be Dr. Dori Moody. The admitting Diagnosis is Depression with auditory hallucinations         Section II - Integrated Summary  Summary:  Patient is a 60year old female brought to the ED by her mother for worsening depression, anxiety and auditory hallucinations. she was lying on stretcher in Bed 21 and requested a blanket to get warm. The patient easily responded to name and the interview. The patient was cooperative and answered all questions asked, although she was tearful and shakingthe whole time . The patient admits to having constant Auditory hallucinations telling her things. The patient denies that the hallucinations are telling her to hurt herself. The patient denies Visual hallucinations the patient denies SI/HI; however, the patient stated, \"I want to go to sleep. I don't want to be around anymore. I am hearing voices. I can't deal with things. \" The patient admits to having changes in eating and sleeping. The patient stated her appetite is poor and she has lost weight recently. The patient stated she has difficulty falling asleep and has frequent nightmare. She only sleeps 4 hours/night    The information is given by the patient. The patient is willing to be admitted to the behavioral health unit at [facility]. The Chief Complaint is depression with auditory hallucinations. The Precipitant Factors are lack of support   Previous Hospitalizations: Last psych hospitalization was in [facility] in November 2018. She has been off of medications since then.   Medical problems: Back issues, cardiac-palpitations  Medications include: Toprol; no psych medication  Current Psychiatrist and/or Case Manager is unknown. Lethality Assessment:     The potential for suicide is noted by the following: worsening auditory hallucinations, racing thoughts, hopelessness, helplessness, lack of support, off of medications for at least 6 months. The potential for homicide is not noted. The patient is felt to be at risk for self harm and not able to care for self. Section III - Psychosocial  The patient's overall mood and attitude is \"very sad\" and depresses. Feelings of helplessness and hopelessness are observed. Generalized anxiety is observed. Panic is not observed. Phobias are not observed. Obsessive compulsive tendencies are not observed. Section IV - Mental Status Exam  The patient's appearance is neat. The patient's behavior is slow when making purposeful movement; however patient rocked back and forth in bed during the interview. Patient appears withdrawn. Poor eye contact. The patient is oriented to person, place, time and situation. The patient's speech is unremarkable. The patient's mood  is sad and depressed. The patient's affect is tearful The range of affect is constricted. The patient complains of racing thoughts and difficulty thinking. The patient's thought content is unremarkable. The patient's judgement is fair. The patient's insight is fair. .     Section V - Substance Abuse  The patient is using substances: UDS negative. BAL-neg       Section VI - Living Arrangements  The patient is single. The patient lives with her mother. The patient does plan to return home upon discharge. Scientologist and cultural practices have not been voiced at this time. The patient's states she has not support system and feels alone. Section VII - Other Areas of Clinical Concern  The patient is currently Disabled. The patient has no communication impairment;   The patient's hearing is normal.  The patient's vision is normal .

## 2019-07-22 NOTE — ED TRIAGE NOTES
Patient arrived from home with parent c/o anxiety attack. Patient mother reports she used to take medications for mental health disorder but has stopped taking them. Patient appear anxious, does not not any medications at this time.    Allergies up to date

## 2019-07-22 NOTE — BH NOTES
Pt arrived to unit accompanied by security and ED tech. Pt appeared very anxious and tremulous and was given 1 mg lorazepam po. Pt has a hx of depression, anxiety, osteoarthritis of the back and knees, herniated disk repair, and palpitations. Pt takes toprol 50 mg daily for the palpitations. Pt c/o increased depression without SI and of non-command AH and states they make it hard to sleep. Pt smokes half a pack of cigarettes a day. Pt states to writer that she is allergic to codeine and aspirin. Pt walks with a cane dt neuropathy in her feet, pt not able to have cane on unit per policy so she was offered a cane or wheelchair but declined. Pt states she has a past inpatient admission when she was living in [facility].

## 2019-07-22 NOTE — ED PROVIDER NOTES
HPI   26-year-old female history of depression, prior UTI who presents with auditory hallucinations and anxiety. Patient is accompanied by her mother and family. Reports she has been off her medications for the past 4 months. Of note, patient reports increased anxiety. Also reports intermittent auditory hallucinations. Currently denies suicidal ideation or homicidal ideation. No visual hallucinations. Of note, patient has had prior psychiatric admission in [facility].         Past Medical History:   Diagnosis Date    Arrhythmia     palpitations  heart murmur    Depression     Headache(784.0)     UTI (urinary tract infection)        Past Surgical History:   Procedure Laterality Date    HX APPENDECTOMY      HX GYN  1989    partial hysterectomy     HX ORTHOPAEDIC  2007    lumbar fusion     HX TONSILLECTOMY           Family History:   Problem Relation Age of Onset    Hypertension Mother     Diabetes Mother     Coronary Artery Disease Other         grandmother    Cancer Other         breast    Hypertension Father     Diabetes Father        Social History     Socioeconomic History    Marital status:      Spouse name: Not on file    Number of children: Not on file    Years of education: Not on file    Highest education level: Not on file   Occupational History    Not on file   Social Needs    Financial resource strain: Not on file    Food insecurity:     Worry: Not on file     Inability: Not on file    Transportation needs:     Medical: Not on file     Non-medical: Not on file   Tobacco Use    Smoking status: Current Every Day Smoker     Packs/day: 0.50     Types: Cigarettes    Smokeless tobacco: Never Used   Substance and Sexual Activity    Alcohol use: No    Drug use: No    Sexual activity: Never     Comment: Hysterectomy   Lifestyle    Physical activity:     Days per week: Not on file     Minutes per session: Not on file    Stress: Not on file   Relationships    Social connections:     Talks on phone: Not on file     Gets together: Not on file     Attends Congregation service: Not on file     Active member of club or organization: Not on file     Attends meetings of clubs or organizations: Not on file     Relationship status: Not on file    Intimate partner violence:     Fear of current or ex partner: Not on file     Emotionally abused: Not on file     Physically abused: Not on file     Forced sexual activity: Not on file   Other Topics Concern    Not on file   Social History Narrative    Not on file         ALLERGIES: Dilaudid [hydromorphone (bulk)]; Augmentin [amoxicillin-pot clavulanate]; Aspirin; Ceclor [cefaclor]; and Codeine    Review of Systems   Constitutional: Negative for fever and unexpected weight change. HENT: Negative. Cardiovascular: Negative for chest pain and palpitations. Gastrointestinal: Negative for abdominal pain, nausea and vomiting. Musculoskeletal: Negative. Skin: Negative. Neurological: Negative. Psychiatric/Behavioral: Positive for hallucinations. Positive for auditory hallucinations       Vitals:    07/22/19 0952   BP: 142/76   Pulse: 99   Resp: 18   Temp: 98.1 °F (36.7 °C)   SpO2: 99%   Height: 5' 2\" (1.575 m)            Physical Exam   Constitutional: She is oriented to person, place, and time. She appears well-developed and well-nourished. HENT:   Head: Normocephalic and atraumatic. Eyes: Pupils are equal, round, and reactive to light. Neck: Normal range of motion. Cardiovascular: Normal rate. Pulmonary/Chest: Effort normal and breath sounds normal.   Neurological: She is alert and oriented to person, place, and time. Skin: Skin is warm and dry. Capillary refill takes less than 2 seconds.         MDM  Number of Diagnoses or Management Options  Diagnosis management comments: 24-year-old female history of depression, prior UTI who presents with auditory hallucinations and anxiety    Patient currently without SI or HI  Will obtain labs to medically clear    We will discuss case with crisis team    11:41 AM  -Case discussed with crisis worker, Demarco Lofton.   Awaiting evaluation and recommendations    - pt accepted for admission to [facility] psychiatry service            Procedures

## 2019-07-23 PROBLEM — Z86.59 HISTORY OF DEPRESSION: Status: RESOLVED | Noted: 2017-10-05 | Resolved: 2019-07-23

## 2019-07-23 PROBLEM — M47.816 SPONDYLOSIS OF LUMBAR REGION WITHOUT MYELOPATHY OR RADICULOPATHY: Status: RESOLVED | Noted: 2017-03-01 | Resolved: 2019-07-23

## 2019-07-23 PROBLEM — Z86.59 HISTORY OF ANXIETY: Status: RESOLVED | Noted: 2017-10-05 | Resolved: 2019-07-23

## 2019-07-23 PROBLEM — F41.1 GENERALIZED ANXIETY DISORDER: Status: ACTIVE | Noted: 2019-07-22

## 2019-07-23 LAB
ATRIAL RATE: 81 BPM
CALCULATED P AXIS, ECG09: 20 DEGREES
CALCULATED R AXIS, ECG10: 36 DEGREES
CALCULATED T AXIS, ECG11: 28 DEGREES
CHOLEST SERPL-MCNC: 155 MG/DL
DIAGNOSIS, 93000: NORMAL
HBA1C MFR BLD: 5.7 % (ref 4.2–5.6)
HDLC SERPL-MCNC: 41 MG/DL (ref 40–60)
HDLC SERPL: 3.8 {RATIO} (ref 0–5)
LDLC SERPL CALC-MCNC: 81.4 MG/DL (ref 0–100)
LIPID PROFILE,FLP: ABNORMAL
P-R INTERVAL, ECG05: 126 MS
Q-T INTERVAL, ECG07: 374 MS
QRS DURATION, ECG06: 80 MS
QTC CALCULATION (BEZET), ECG08: 434 MS
TRIGL SERPL-MCNC: 163 MG/DL (ref ?–150)
VENTRICULAR RATE, ECG03: 81 BPM
VLDLC SERPL CALC-MCNC: 32.6 MG/DL

## 2019-07-23 PROCEDURE — 74011250637 HC RX REV CODE- 250/637: Performed by: PSYCHIATRY & NEUROLOGY

## 2019-07-23 PROCEDURE — 36415 COLL VENOUS BLD VENIPUNCTURE: CPT

## 2019-07-23 PROCEDURE — 65220000003 HC RM SEMIPRIVATE PSYCH

## 2019-07-23 PROCEDURE — 83036 HEMOGLOBIN GLYCOSYLATED A1C: CPT

## 2019-07-23 PROCEDURE — 93005 ELECTROCARDIOGRAM TRACING: CPT

## 2019-07-23 PROCEDURE — 80061 LIPID PANEL: CPT

## 2019-07-23 RX ORDER — VENLAFAXINE HYDROCHLORIDE 37.5 MG/1
37.5 CAPSULE, EXTENDED RELEASE ORAL
Status: DISCONTINUED | OUTPATIENT
Start: 2019-07-23 | End: 2019-07-24

## 2019-07-23 RX ORDER — METOPROLOL SUCCINATE 25 MG/1
50 TABLET, EXTENDED RELEASE ORAL DAILY
Status: DISCONTINUED | OUTPATIENT
Start: 2019-07-23 | End: 2019-07-29 | Stop reason: HOSPADM

## 2019-07-23 RX ORDER — RISPERIDONE 1 MG/1
1 TABLET, FILM COATED ORAL
Status: DISCONTINUED | OUTPATIENT
Start: 2019-07-23 | End: 2019-07-25

## 2019-07-23 RX ADMIN — VENLAFAXINE HYDROCHLORIDE 37.5 MG: 37.5 CAPSULE, EXTENDED RELEASE ORAL at 10:12

## 2019-07-23 RX ADMIN — LORAZEPAM 1 MG: 1 TABLET ORAL at 10:14

## 2019-07-23 RX ADMIN — METOPROLOL SUCCINATE 50 MG: 25 TABLET, EXTENDED RELEASE ORAL at 10:12

## 2019-07-23 RX ADMIN — RISPERIDONE 1 MG: 1 TABLET ORAL at 20:26

## 2019-07-23 RX ADMIN — LORAZEPAM 1 MG: 1 TABLET ORAL at 20:27

## 2019-07-23 NOTE — H&P
[address] [facility] [address]  Inpatient Admission Note    Date of Service:  07/23/19    Historian(s): Kathie Helton and chart review  Referral Source: [facility] ED    Chief Complaint   \"I keep breaking down and hearing voices in my head. I cannot cope with everyday life. \"    History of Present Illness     Kathie Gross is a 60 y.o. WHITE OR  female with a history of depressive Disorder and Anxiety Disorder who was brought to the ED by her mother due to worsening depression and passive SI without actual intent or plan. Pt was admitted voluntarily. She has a history of MDD and Anxiety but has not had any treatment in almost 9 months. She has no current outpatient mental health provider. Pt feels her mood has worsened to the point where she is unable to cope with daily activities and is not functioning. Auditory hallucinations have increased in intensity and are \"non-stop\" and very bothersome. She therefore decided to seek treatment. Pt reports she started dealing with depression about 7 years ago when her  left her for another woman after over 36 years of marriage. She had become dependent on him as he never wanted her to work and she had no experience with the work force. Pt says she is still mourning the loss of the relationship and still has dreams that they will get back together someday although he has remarried another woman. After the divorce she really could not afford to live on her own and went to live with her daughter in [place] for some time. Her daughter is currently going through a separation with her  so pt is not able to live with her now and is living with her 77 y/o mother who is medically frail. Pt reports financial stressors, health stressors and family stressors. She deals with chronic back pain and sciatica, used to take Oxycodone  for several years but has been weaned off and currently is not taking anything for pain.     Depressive symptoms include feelings of hopelessness, helplessness, passive SI, loss of appetite and weight loss, guilt about the loss of her marriage, anhedonia, fatigue and inability to focus. Pt also has auditory hallucinations but no visual hallucinations or paranoia. Pt reports she worries excessive about everything. The anxiety causes tension in her body, headaches and stomach issues. Her mother and daughter are constantly yelling at her for \"being all negative about everything. \" She feels that she worries more than others would in a similar situation. Pt denies manic symptoms or symptoms consistent with PTSD. She denies past history of sexual or physical abuse. Pt smokes half PPD of cigarettes. She denies use of alcohol or illicit street drugs. UDS was negative and BAL <3.      Psychiatric Review of Systems   See HPI    Medical Review of Systems     Sleep: poor  Appetite: poor  Chronic back, knee and leg pain. 10 point review of systems was completed. Significant findings are found in the HPI or MSE. Psychiatric Treatment History     Self-injurious behavior/risky thoughts or behaviors (past suicidal ideation/attempt):   Denies any prior history of suicide attempts. Violence/Risk to others (past homicidal ideation/attempt):    Denies any prior history of violence or homicidal ideation. Previous psychiatric medication trials: Prozac, Zoloft, Seroquel, Paxil, Klonopin, Xanax    Previous psychiatric hospitalizations: One prior hospitalization in [facility] last November for \"nervous breakdown\". Current therapist: None    Current psychiatric provider: None. Pt says she has seen Psychiatrists over the years who have tried different medications. Her last contact with a  Psychiatrist was about a year ago in [place], [place].     Allergies      Allergies   Allergen Reactions    Dilaudid [Hydromorphone (Bulk)] Palpitations     tachycardia    Augmentin [Amoxicillin-Pot Clavulanate] Nausea and Vomiting    Aspirin Other (comments) tinnitus    Ceclor [Cefaclor] Other (comments)     Patient not sure of reaction    Codeine Other (comments)     GI       Medical History     Past Medical History:   Diagnosis Date    Arrhythmia     palpitations  heart murmur    Depression     Headache(784.0)     UTI (urinary tract infection)    Lumbar radiculopathy, DJD, GI issues, multiple back surgeries, Colonoscopy with precancerous tissue found. Medication(s)     Prior to Admission Medications   Prescriptions Last Dose Informant Patient Reported? Taking? CRANBERRY EXTRACT (CRANBERRY PO) Not Taking at Unknown time  Yes No   Sig: Take 3,600 mg by mouth daily. FLUoxetine (PROZAC) 20 mg tablet Not Taking at Unknown time  Yes No   Sig: Take 20 mg by mouth daily. MULTIVITS,STRESS FORMULA (STRESSTABS PO) Not Taking at Unknown time  Yes No   Sig: Take  by mouth daily. OTHER Not Taking at Unknown time  Yes No   Sig: Take 1 Tab by mouth daily. I cool tabs    benzonatate (TESSALON) 200 mg capsule Not Taking at Unknown time  Yes No   Sig: Take 200 mg by mouth three (3) times daily as needed for Cough. carisoprodol (SOMA) 350 mg tablet Not Taking at Unknown time  Yes No   Sig: Take 350 mg by mouth four (4) times daily. clonazePAM (KLONOPIN) 1 mg tablet Not Taking at Unknown time  Yes No   Sig: Take 1 mg by mouth three (3) times daily. dicyclomine (BENTYL) 20 mg tablet Not Taking at Unknown time  Yes No   Sig: Take 20 mg by mouth every six (6) hours. docusate sodium (DOCUSOFT S) 100 mg capsule Not Taking at Unknown time  Yes No   Sig: Take 100 mg by mouth two (2) times a day.   gabapentin (NEURONTIN) 600 mg tablet Not Taking at Unknown time  No No   Si po bid x 14 days, then 1 po tid  Indications: NEUROPATHIC PAIN   Patient taking differently: 300 mg two (2) times a day. 1 po bid x 14 days, then 1 po tid  Indications: NEUROPATHIC PAIN   haloperidol (HALDOL) 0.5 mg tablet Not Taking at Unknown time  Yes No   Sig: Take 0.5 mg by mouth. hydrochlorothiazide (HYDRODIURIL) 12.5 mg tablet Not Taking at Unknown time  No No   Sig: Take 1 Tab by mouth daily. linaclotide (LINZESS) 145 mcg cap capsule Not Taking at Unknown time  Yes No   Sig: Take  by mouth Daily (before breakfast). metoprolol-XL (TOPROL XL) 50 mg XL tablet 7/21/2019 at Unknown time  Yes Yes   Sig: Take 50 mg by mouth daily. mirtazapine (REMERON) 45 mg tablet Not Taking at Unknown time  Yes No   Sig: Take 45 mg by mouth nightly. potassium chloride SA (MICRO-K) 10 mEq capsule Not Taking at Unknown time  Yes No   Sig: Take 10 mEq by mouth two (2) times a day. sertraline (ZOLOFT) 25 mg tablet Not Taking at Unknown time  Yes No   Sig: Take 100 mg by mouth two (2) times a day. solifenacin (VESICARE) 10 mg tablet Not Taking at Unknown time  Yes No   Sig: Take 10 mg by mouth daily. topiramate (TOPAMAX) 50 mg tablet Not Taking at Unknown time  Yes No   Sig: Take 50 mg by mouth two (2) times a day. Pt takes 100 mg in AM and 150 mg at bedtime      Facility-Administered Medications: None         Substance Abuse History     Tobacco: half PPD  Alcohol: denied  Marijuana: denied  Cocaine: denied  Opiate: denied  Benzodiazepine: denied  Other: denied    Consequences: none    History of detox: none    History of substance abuse treatment: none    Family History     Family History   Problem Relation Age of Onset    Hypertension Mother     Diabetes Mother     Coronary Artery Disease Other         grandmother    Cancer Other         breast    Hypertension Father     Diabetes Father        Psychiatric Family History  Paternal aunt with Schizophrenia. Family history of suicide? No    Social History     Pt was born and raised in [place]. Raised mainly by aunt and mother. Her aunt was in [place] and patient went back and forth between [facility] and [place]. Her biological father was not involved. Pt graduated high School . She got  right after that. She has one daughter.  She is . She is disabled from chronic back pain and has never worked out of the home. She denies legal history. Vitals/Labs      Vitals:    07/22/19 0952 07/22/19 1557 07/23/19 0755   BP: 142/76 149/83 111/66   Pulse: 99 88 86   Resp: 18 16 18   Temp: 98.1 °F (36.7 °C) 98.5 °F (36.9 °C) 97.5 °F (36.4 °C)   SpO2: 99%     Weight:  51.7 kg (114 lb)    Height: 5' 2\" (1.575 m) 5' 2\" (1.575 m)      Physical Exam   Constitutional: She is oriented to person, place, and time. She appears well-developed and well-nourished. HENT:   Head: Normocephalic and atraumatic. Eyes: Pupils are equal, round, and reactive to light. Neck: Normal range of motion. Cardiovascular: Normal rate. Pulmonary/Chest: Effort normal and breath sounds normal.   Neurological: She is alert and oriented to person, place, and time. Skin: Skin is warm and dry. Capillary refill takes less than 2 seconds    Labs:   Results for orders placed or performed during the hospital encounter of 07/22/19   DRUG SCREEN, URINE   Result Value Ref Range    BENZODIAZEPINES NEGATIVE  NEG      BARBITURATES NEGATIVE  NEG      THC (TH-CANNABINOL) NEGATIVE  NEG      OPIATES NEGATIVE  NEG      PCP(PHENCYCLIDINE) NEGATIVE  NEG      COCAINE NEGATIVE  NEG      AMPHETAMINES NEGATIVE  NEG      METHADONE NEGATIVE  NEG      HDSCOM (NOTE)    HCG URINE, QL   Result Value Ref Range    HCG urine, QL NEGATIVE  NEG     CBC WITH AUTOMATED DIFF   Result Value Ref Range    WBC 11.7 4.6 - 13.2 K/uL    RBC 5.28 4.20 - 5.30 M/uL    HGB 16.0 12.0 - 16.0 g/dL    HCT 46.8 (H) 35.0 - 45.0 %    MCV 88.6 74.0 - 97.0 FL    MCH 30.3 24.0 - 34.0 PG    MCHC 34.2 31.0 - 37.0 g/dL    RDW 14.4 11.6 - 14.5 %    PLATELET 238 (H) 368 - 420 K/uL    MPV 10.7 9.2 - 11.8 FL    NEUTROPHILS 65 40 - 73 %    LYMPHOCYTES 26 21 - 52 %    MONOCYTES 5 3 - 10 %    EOSINOPHILS 4 0 - 5 %    BASOPHILS 0 0 - 2 %    ABS. NEUTROPHILS 7.6 1.8 - 8.0 K/UL    ABS. LYMPHOCYTES 3.0 0.9 - 3.6 K/UL    ABS.  MONOCYTES 0.6 0.05 - 1.2 K/UL    ABS. EOSINOPHILS 0.4 0.0 - 0.4 K/UL    ABS. BASOPHILS 0.1 0.0 - 0.1 K/UL    DF AUTOMATED     METABOLIC PANEL, BASIC   Result Value Ref Range    Sodium 143 136 - 145 mmol/L    Potassium 3.7 3.5 - 5.5 mmol/L    Chloride 109 100 - 111 mmol/L    CO2 29 21 - 32 mmol/L    Anion gap 5 3.0 - 18 mmol/L    Glucose 100 (H) 74 - 99 mg/dL    BUN 10 7.0 - 18 MG/DL    Creatinine 0.62 0.6 - 1.3 MG/DL    BUN/Creatinine ratio 16 12 - 20      GFR est AA >60 >60 ml/min/1.73m2    GFR est non-AA >60 >60 ml/min/1.73m2    Calcium 9.3 8.5 - 10.1 MG/DL   ETHYL ALCOHOL   Result Value Ref Range    ALCOHOL(ETHYL),SERUM <3 0 - 3 MG/DL   SALICYLATE   Result Value Ref Range    Salicylate level 2.9 2.8 - 20.0 MG/DL   ACETAMINOPHEN   Result Value Ref Range    Acetaminophen level <2 (L) 10.0 - 30.0 ug/mL   TSH 3RD GENERATION   Result Value Ref Range    TSH 1.44 0.36 - 3.74 uIU/mL   HEMOGLOBIN A1C W/O EAG   Result Value Ref Range    Hemoglobin A1c 5.7 (H) 4.2 - 5.6 %   LIPID PANEL   Result Value Ref Range    LIPID PROFILE          Cholesterol, total 155 <200 MG/DL    Triglyceride 163 (H) <150 MG/DL    HDL Cholesterol 41 40 - 60 MG/DL    LDL, calculated 81.4 0 - 100 MG/DL    VLDL, calculated 32.6 MG/DL    CHOL/HDL Ratio 3.8 0 - 5.0     EKG, 12 LEAD, INITIAL   Result Value Ref Range    Ventricular Rate 81 BPM    Atrial Rate 81 BPM    P-R Interval 126 ms    QRS Duration 80 ms    Q-T Interval 374 ms    QTC Calculation (Bezet) 434 ms    Calculated P Axis 20 degrees    Calculated R Axis 36 degrees    Calculated T Axis 28 degrees    Diagnosis       Normal sinus rhythm  Normal ECG  When compared with ECG of 15-OCT-2018 12:36,  ST no longer depressed in Anterior leads  T wave inversion no longer evident in Inferior leads  Nonspecific T wave abnormality no longer evident in Anterolateral leads         Mental Status Examination     Appearance/Hygiene 60 y.o.  WHITE OR  female  Hygiene: fair   Behavior/Social Relatedness Appropriate Musculoskeletal Gait/Station: appropriate  Tone (flaccid, cogwheeling, spastic): not assessed  Psychomotor (hyperkinetic, hypokinetic): calm  Involuntary movements (tics, dyskinesias, akathisa, stereotypies): none   Speech   Rate, rhythm, volume, fluency and articulation are appropriate   Mood   depressed   Affect    depressed   Thought Process Linear and goal directed    Vagueness, incoherence, circumstantiality, tangentiality, neologisms, perseveration, flight of ideas, or self-contradictory statements not present on assessment   Thought Content and Perceptual Disturbances Denies delusions, ideas of reference, overvalued ideas, ruminations, obsession, compulsions, and phobias    Denies self-injurious behavior (SIB), suicidal ideation (SI), aggressive behavior or homicidal ideation (HI)    Endorses auditory hallucinations   Sensorium and Cognition  AOx4, attention intact, memory intact, language use appropriate, and fund of knowledge age appropriate   Insight  fair   Judgment fair       Suicide Risk Assessment     Admission  Date/Time: 07/23/19    [x] Admission  [] Discharge     Key Factors:   Current admission precipitated by suicide attempt?   []  Yes     2    [x]  No     1     Suicide Attempt History  [] Past attempts of high lethality    2 []  Past attempts of low lethality    1 [x]  No previous attempts       0   Suicidal Ideation []  Constant suicidal thoughts      2 []  Intermittent or fleeting suicidal  thoughts  1 [x]  Denies current suicidal thoughts    0   Suicide Plan   []  Has plan with actual OR potential access to planned method    2 []  Has plan without access to planned method      1 [x]  No plan            0   Plan Lethality []  Highly lethal plan (Carbon monoxide, gun, hanging, jumping)    2 []  Moderate lethality of plan          1 []  Low lethality of plan (biting, head banging, superficial scratching, pillow over face)  0   Safety Plan Agreement  []  Unwilling OR unable to agree due to impaired reality testing   2   []  Patient is ambivalent and/or guarded      1 [x]  Reliably agrees        0   Current Morbid Thoughts (reunion fantasies, preoccupations with death) []  Constantly     2     []  Frequently    1 [x]  Rarely    0   Elopement Risk  []  High risk     2 []  Moderate risk    1 [x]   Low risk    0   Symptoms    [x]  Hopeless  [x]  Helpless  [x]  Anhedonia   [x]  Guilt/shame  []  Anger/rage  [x]  Anxiety  [x]  Insomnia   []  Agitation   []  Impulsivity  [x]  5-6 symptoms present    2 []  3-4 symptoms present    1  []  0-2 symptoms present    0     Total Score: 3  --------------------------------------------------------------------------------------------------------------  Subjective Appraisal of Risk:  []  Patient replies not trustworthy: several non-verbal cues. []  Patient replies questionable: trustworthy: at least 1 non-verbal cue. [x]  Patient replies appear trustworthy. Protective measures (select all that apply):  []  Successful past responses to stress  []  Spiritual/Restoration beliefs  [x]  Capacity for reality testing  []  Positive therapeutic relationships  []  Social supports/connections  []  Positive coping skills  []  Frustration tolerance/optimism  []  Children or pets in the home  [x]  Sense of responsibility to family  [x]  Agrees to treatment plan and follow up    High Risk Diagnoses (select all that apply):  [x]  Depression/Bipolar Disorder  []  Dual Diagnosis  []  Cardiovascular Disease  []  Schizophrenia  []  Chronic Pain  []  Epilepsy  []  Cancer  []  Personality Disorder  []  HIV/AIDS  []  Multiple Sclerosis    Dangerousness Assessment (Suicide, homicide, property destruction. ..)    Risk Factors reviewed and risk assessed to be:  [] low  [] low-moderate  [] moderate   [x] moderate-high  [] high     Protection factors reviewed and risk assessed to be:  [] low  [x] low-moderate  [] moderate   [] moderate-high  [] high     Response to treatment and risk assessed to be:  [x] low  [] low-moderate  [] moderate   [] moderate-high  [] high     Support reviewed and risk assessed to be:  [x] low  [] low-moderate  [] moderate   [] moderate-high  [] high     Acceptance of Discharge and outpatient treatment reviewed and risk assessed to be:    [] low  [] low-moderate  [] moderate   [] moderate-high  [] high   Overall risk assessed to be:  [] low  [] low-moderate  [] moderate   [x] moderate-high  [] high       Assessment and Plan     Psychiatric Diagnoses:   Patient Active Problem List   Diagnosis Code    Rheumatoid aortitis 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.1    Lumbosacral spondylosis without myelopathy M47.817    Postlaminectomy syndrome, lumbar region M96.1    Fibromyalgia M79.7    Spasm of muscle M62.838    Chronic pain syndrome G89.4    Lumbar degenerative disc disease M51.36    Lumbar radiculopathy M54.16    Urgency incontinence N39.41    Generalized anxiety disorder F41.1    Severe recurrent depression with psychosis ([facility] [address] F33.3       Psychosocial and contextual factors: Financial, health and family stressors    Level of impairment/disability: moderate    Kathie Helton is a 60 y.o. who is currently requiring acute stabilization due to acute depressive episode with psychosis and inability to function. .     1. Admit to locked inpatient behavioral health unit. Start milieu, group, art and occupation therapy. 2. Start Effexor XR 75mg daily for anxiety and depression. Start Risperdal 1mg qhs for psychosis. 3. Routine labs ordered and reviewed by this provider. 4. Reviewed instructions, risks, benefits and side effects. 5. Start disposition planning; verify upcoming outpatient appointments with therapist and/or psychiatric medication prescriber.    6. Tentative date of discharge: 5 to 7 days       Marley Shane MD  [id number] [facility]

## 2019-07-23 NOTE — BSMART NOTE
SW assessment/Intervention:  Carlos Holland is a 60 y.o. WHITE OR  female with a history of depressive Disorder and Anxiety Disorder who was brought to the ED by her mother due to worsening depression and passive SI without actual intent or plan. Pt was admitted voluntarily. Patient is observed in bed. Affect is flat and depressed. Endorses AH. Patient reports prior to discharge \"I felt like breaking down\". Patient reports she resides with her 78year old mother and is extremely anxious about what will happen if her mom dies. Patient reports she is unemployed and . Patient reports increased sadness after her divorce. Patient is encouraged to engage in the milieu and comply with unit rules. SW will continue to monitor patient.     Heddy Castleman, LCSW-E

## 2019-07-23 NOTE — BH NOTES
MHT Note: Patient interacts with staff and peers kindly and cooperatively. Pt keeps to her self and rests in her room most of the shift. Pt had no visitors today. Pt ate all meals on shift. Pt reports no new pain or symptoms and was asked to inform care team of any changes in condition. Staff will continue to monitor pt for safety, behavior and location.

## 2019-07-24 PROCEDURE — 74011250637 HC RX REV CODE- 250/637: Performed by: PSYCHIATRY & NEUROLOGY

## 2019-07-24 PROCEDURE — 65220000003 HC RM SEMIPRIVATE PSYCH

## 2019-07-24 RX ORDER — VENLAFAXINE HYDROCHLORIDE 75 MG/1
75 CAPSULE, EXTENDED RELEASE ORAL
Status: DISCONTINUED | OUTPATIENT
Start: 2019-07-25 | End: 2019-07-29 | Stop reason: HOSPADM

## 2019-07-24 RX ORDER — LORAZEPAM 1 MG/1
1 TABLET ORAL
Status: DISCONTINUED | OUTPATIENT
Start: 2019-07-24 | End: 2019-07-29 | Stop reason: HOSPADM

## 2019-07-24 RX ORDER — LORAZEPAM 1 MG/1
2 TABLET ORAL
Status: DISCONTINUED | OUTPATIENT
Start: 2019-07-24 | End: 2019-07-29 | Stop reason: HOSPADM

## 2019-07-24 RX ADMIN — LORAZEPAM 2 MG: 1 TABLET ORAL at 16:55

## 2019-07-24 RX ADMIN — LORAZEPAM 1 MG: 1 TABLET ORAL at 08:22

## 2019-07-24 RX ADMIN — METOPROLOL SUCCINATE 50 MG: 25 TABLET, EXTENDED RELEASE ORAL at 08:22

## 2019-07-24 RX ADMIN — RISPERIDONE 1 MG: 1 TABLET ORAL at 20:28

## 2019-07-24 RX ADMIN — TRAZODONE HYDROCHLORIDE 50 MG: 50 TABLET ORAL at 20:30

## 2019-07-24 RX ADMIN — VENLAFAXINE HYDROCHLORIDE 37.5 MG: 37.5 CAPSULE, EXTENDED RELEASE ORAL at 08:22

## 2019-07-24 NOTE — BSMART NOTE
SW assessment/Intervention:  Carlos Holland is a 59 y/o woman admitted for worsening depression and anxiety and inability to cope. No acute events overnight. Pt is anxious and depressed. SW made contact with patient as she remained isolated in the milieu. Patient appears to have tremors and is requesting medication for her anxiety. Patient reports she just does not feel well and is requesting to rest.  SW will continue to monitor patient and will assist as needed.     Heddy Castleman, GRIFFIN-E

## 2019-07-24 NOTE — BH NOTES
Treatment team met -     Medical Director:                                _x____present        Psychiatrist:                                        x_____present      Charge nurse:                                     x_____present           MSW:                                                _x____present      :                      _x____present      Nurse Manager:                                  x_____present      Student RNs:                                      _____present      Medical Students:                               _____present      Art Therapist:                                      _x____present   Clinical Coordinator:                           _x____present   Internal :                      _x____present        Plan of care discussed and updated as appropriate.

## 2019-07-24 NOTE — PROGRESS NOTES
Problem: Depressed Mood (Adult/Pediatric)  Goal: *STG: Participates in treatment plan  Description  Pt will take an active role in her treatment daily. Outcome: Progressing Towards Goal  Note:   Patient participates in treatment plan every day while hospitalized. Problem: Depressed Mood (Adult/Pediatric)  Goal: *STG: Remains safe in hospital  Description  Pt will contract for safety and be free of harm daily. Outcome: Progressing Towards Goal  Note:   Patient remains safe every day while hospitalized. Problem: Depressed Mood (Adult/Pediatric)  Goal: *STG: Complies with medication therapy  Description  Pt will take all medications as prescribed daily. Outcome: Progressing Towards Goal  Note:   Patient takes medications as prescribed every day while hospitalized. Patient is cooperative, patient takes medications as prescribed. Patient takes medications as prescribed, patient gets along with peers will continue to monitor.

## 2019-07-24 NOTE — PROGRESS NOTES
ART THERAPY GROUP PROGRESS NOTE    Group time:1315    The patient declined group despite encouragement.   In bed

## 2019-07-24 NOTE — PROGRESS NOTES
[address] [facility] [address]  Inpatient Progress Note     Date of Service: 07/24/19  Hospital Day: 2     Subjective/Interval History   07/24/19    Treatment Team Notes:  Notes reviewed and/or discussed and report that Emily Roman is a 59 y/o woman admitted for worsening depression and anxiety and inability to cope. No acute events overnight. Pt is anxious and depressed. Patient interview: Emily Roman was interviewed by this writer today. Pt continues to endorse feelings of sadness and helplessness. She is constantly ruminating about her current situation, worried about her mother and what will happen to her if her mother dies. Pt is unable to come up with any solution or plan for her future. She is still experiencing auditory hallucinations and has not had any change in that. She tolerated Risperdal last night. Objective     Visit Vitals  /72   Pulse 76   Temp 96.7 °F (35.9 °C)   Resp 16   Ht 5' 2\" (1.575 m)   Wt 51.7 kg (114 lb)   SpO2 99%   BMI 20.85 kg/m²       No results found for this or any previous visit (from the past 24 hour(s)). Mental Status Examination     Appearance/Hygiene 60 y.o.  WHITE OR  female  Hygiene: good   Behavior/Social Relatedness Appropriate   Musculoskeletal Gait/Station: appropriate  Tone (flaccid, cogwheeling, spastic): not assessed  Psychomotor (hyperkinetic, hypokinetic): calm   Involuntary movements (tics, dyskinesias, akathisa, stereotypies): none   Speech   Rate, rhythm, volume, fluency and articulation are appropriate   Mood   depressed   Affect    Sad and tearful   Thought Process Linear and goal directed   Thought Content and Perceptual Disturbances Denies self-injurious behavior (SIB), suicidal ideation (SI), aggressive behavior or homicidal ideation (HI)    Denies auditory and visual hallucinations   Sensorium and Cognition  Grossly intact   Insight  limited   Judgment limited        Assessment/Plan      Psychiatric Diagnoses:   Patient Active Problem List   Diagnosis Code    Rheumatoid aortitis 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 Level of impairment/disability: severe    Kathie MARY Cardona is a 60 y.o. who is currently admitted for severe depression with psychosis. 1.  Increase Effexor XR to 75mg daily for depression, anxiety, chronic pain. Ativan 1mg every 6 hours as needed for anxiety  2. Reviewed instructions, risks, benefits and side effects of medications  3. Disposition/Discharge Date: self-care/home, TBD.     MD [facility]. [facility]  [facility]iatry

## 2019-07-24 NOTE — PROGRESS NOTES
Problem: Falls - Risk of  Goal: *Absence of Falls  Description  Document Shahida Ingram Fall Risk and appropriate interventions in the flowsheet. Pt will be free of falls daily. Outcome: Progressing Towards Goal  Note:   Fall Risk Interventions:  Mobility Interventions: Utilize walker, cane, or other assistive device         Medication Interventions: Teach patient to arise slowly         History of Falls Interventions: Door open when patient unattended    Aeb pt free of falls this shift     Problem: Depressed Mood (Adult/Pediatric)  Goal: *STG: Remains safe in hospital  Description  Pt will contract for safety and be free of harm daily. Outcome: Progressing Towards Goal  Note:   Aeb pt free of harm this shift  Goal: *STG: Complies with medication therapy  Description  Pt will take all medications as prescribed daily. Outcome: Progressing Towards Goal  Note:   Aeb pt taking all meds as prescribed this shift    Pt has been calm and cooperative this shift. Pt received 1 mg po lorazepam for c/o anxiety. Pt's orders for lorazepam changed to Q6H per Dr Candelario Nurse. Pt has been present in milieu for most of shift; however, pt does not interact with peers. Pt appears sad and anxious. Pt go up from the day room when it was time for groups, states that groups dont help her. Pt denies SI/HI/AVH at this time but states that she feels hopeless. Will continue to monitor pt for safety and provide intervention as necessary.

## 2019-07-25 PROCEDURE — 74011250637 HC RX REV CODE- 250/637: Performed by: PSYCHIATRY & NEUROLOGY

## 2019-07-25 PROCEDURE — 65220000003 HC RM SEMIPRIVATE PSYCH

## 2019-07-25 RX ORDER — RISPERIDONE 2 MG/1
2 TABLET, FILM COATED ORAL
Status: DISCONTINUED | OUTPATIENT
Start: 2019-07-25 | End: 2019-07-29 | Stop reason: HOSPADM

## 2019-07-25 RX ORDER — BUSPIRONE HYDROCHLORIDE 10 MG/1
10 TABLET ORAL 3 TIMES DAILY
Status: DISCONTINUED | OUTPATIENT
Start: 2019-07-25 | End: 2019-07-29 | Stop reason: HOSPADM

## 2019-07-25 RX ORDER — TRAZODONE HYDROCHLORIDE 100 MG/1
100 TABLET ORAL
Status: DISCONTINUED | OUTPATIENT
Start: 2019-07-25 | End: 2019-07-29 | Stop reason: HOSPADM

## 2019-07-25 RX ADMIN — BUSPIRONE HYDROCHLORIDE 10 MG: 10 TABLET ORAL at 20:30

## 2019-07-25 RX ADMIN — TRAZODONE HYDROCHLORIDE 100 MG: 100 TABLET ORAL at 20:30

## 2019-07-25 RX ADMIN — METOPROLOL SUCCINATE 50 MG: 25 TABLET, EXTENDED RELEASE ORAL at 08:07

## 2019-07-25 RX ADMIN — RISPERIDONE 2 MG: 2 TABLET ORAL at 20:31

## 2019-07-25 RX ADMIN — BUSPIRONE HYDROCHLORIDE 10 MG: 10 TABLET ORAL at 11:36

## 2019-07-25 RX ADMIN — VENLAFAXINE HYDROCHLORIDE 75 MG: 75 CAPSULE, EXTENDED RELEASE ORAL at 08:07

## 2019-07-25 NOTE — BH NOTES
GROUP THERAPY PROGRESS NOTE    Rolando Helton is participating in [place]. Group time: 30 minutes    Personal goal for participation: had no goal    Goal orientation: personal    Group therapy participation: passive    Therapeutic interventions reviewed and discussed: Pt was encouraged to participate more in treatment   other  than spending time quietly in group and not dealing with issues,    Impression of participation: Sat quietly in group.

## 2019-07-25 NOTE — BH NOTES
Kathie Helton   Isolated resting in bed this shift. Pt received a visit from her mother, visit went well. Pt. denies SI,HI and AVH today. Pt ate 100% of dinner and snack. Pt contracts for safety on the unit. Pt.denies any new medical/pain complaints. Staff encouraged Pt. to participate in treatment plan. Pt agreed. Pt. remain free of falls and provided non skid socks. Staff will continue to monitor Pt. for behavior safety and location.

## 2019-07-25 NOTE — PROGRESS NOTES
Problem: Falls - Risk of  Goal: *Absence of Falls  Description  Document Asad Montes Fall Risk and appropriate interventions in the flowsheet. Pt will be free of falls daily. Outcome: Progressing Towards Goal  Note:   Fall Risk Interventions:  Mobility Interventions: Utilize walker, cane, or other assistive device         Medication Interventions: Teach patient to arise slowly         History of Falls Interventions: Door open when patient unattended         Problem: Depressed Mood (Adult/Pediatric)  Goal: *STG: Remains safe in hospital  Description  Pt will contract for safety and be free of harm daily. Outcome: Progressing Towards Goal     Problem: Depressed Mood (Adult/Pediatric)  Goal: *STG: Complies with medication therapy  Description  Pt will take all medications as prescribed daily. Outcome: Progressing Towards Goal     Problem: Depressed Mood (Adult/Pediatric)  Goal: *STG: Attends activities and groups  Description  Pt will attend at least 3 groups daily. Outcome: Not Progressing Towards Goal    Pt continues to isolate to self in room, out for meals and meds. Pt continues to endorse increased anxiety. Pt compliant with meals and meds. Rounds maintained Q 15 mins.  Staff will continue to offer a safe and supportive environment

## 2019-07-25 NOTE — PROGRESS NOTES
OCCUPATIONAL THERAPY PROGRESS NOTE  Group Time:  5581  Attendance: The patient attended full group. Participation:    The patient participated with minimal elaboration in the activity. Attention:  The patient was able to focus on the activity. Interaction:. The patient acknowledges others or responds to questions,  with no spontaneous interaction. Participated when called on with vague statements in discussion on stressors and stress management.

## 2019-07-25 NOTE — PROGRESS NOTES
[address]  Inpatient Progress Note     Date of Service: 07/25/19  Hospital Day: 3     Subjective/Interval History   07/25/19    Treatment Team Notes:  Notes reviewed and/or discussed and report that Smith Escudero is a 61 y/o woman admitted for worsening depression and anxiety and inability to cope. No acute events overnight. Pt is anxious and depressed, focused on getting Ativan as frequently as possible. Not attending any groups. Patient interview: Smith Escudero was interviewed by this writer today. Pt continues to endorse feelings of sadness and helplessness. She is constantly ruminating about her current situation, worried about her mother and what will happen to her if her mother dies. Pt is unable to come up with any solution or plan for her future. She is still experiencing auditory hallucinations and has not had any change in that. Pt encouraged to attend groups to gain some coping skills. She says she is too anxious and uncomfortable around other people and does not want to attend. Objective     Visit Vitals  /71 (BP Patient Position: Sitting)   Pulse 82   Temp 97.8 °F (36.6 °C)   Resp 18   Ht 5' 2\" (1.575 m)   Wt 51.7 kg (114 lb)   SpO2 99%   BMI 20.85 kg/m²       No results found for this or any previous visit (from the past 24 hour(s)). Mental Status Examination     Appearance/Hygiene 60 y.o.  WHITE OR  female  Hygiene: good   Behavior/Social Relatedness Appropriate   Musculoskeletal Gait/Station: appropriate  Tone (flaccid, cogwheeling, spastic): not assessed  Psychomotor (hyperkinetic, hypokinetic): calm   Involuntary movements (tics, dyskinesias, akathisa, stereotypies): none   Speech   Rate, rhythm, volume, fluency and articulation are appropriate   Mood   depressed   Affect    Sad    Thought Process Linear and goal directed   Thought Content and Perceptual Disturbances Denies self-injurious behavior (SIB), suicidal ideation (SI), aggressive behavior or homicidal ideation (HI)    Denies auditory and visual hallucinations   Sensorium and Cognition  Grossly intact   Insight  limited   Judgment limited        Assessment/Plan      Psychiatric Diagnoses:   Patient Active Problem List   Diagnosis Code    Rheumatoid aortitis 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 Level of impairment/disability: severe    Kathie Colin is a 60 y.o. who is currently admitted for severe depression with psychosis. 1.  Increase Risperdal to 2mg qhs for hallucinations. Add Buspar 10mg TID for anxiety. 2.  Reviewed instructions, risks, benefits and side effects of medications  3. Disposition/Discharge Date: self-care/home, TBD.     Grant Garcia MD  [facility]  Psychiatry

## 2019-07-25 NOTE — BSMART NOTE
SW assessment/Intervention:  Patient remains isolated after being encouraged to engage with peers in group activities. Patient is observed in the milieu for meals however, returns to her room. Patient reports continued feelings of sadness. Nursing staff reports: Patient continues to isolate to self in room, out for meals and meds. Pt continues to endorse increased anxiety. Pt compliant with meals and meds. Rounds maintained Q 15 mins. Staff will continue to offer a safe and supportive environment. SW will continue to monitor patient as needed.     Ben Lazcano LCSW-E     :

## 2019-07-26 PROCEDURE — 65220000003 HC RM SEMIPRIVATE PSYCH

## 2019-07-26 PROCEDURE — 74011250637 HC RX REV CODE- 250/637: Performed by: PSYCHIATRY & NEUROLOGY

## 2019-07-26 RX ADMIN — VENLAFAXINE HYDROCHLORIDE 75 MG: 75 CAPSULE, EXTENDED RELEASE ORAL at 08:36

## 2019-07-26 RX ADMIN — BUSPIRONE HYDROCHLORIDE 10 MG: 10 TABLET ORAL at 13:16

## 2019-07-26 RX ADMIN — RISPERIDONE 2 MG: 2 TABLET ORAL at 20:34

## 2019-07-26 RX ADMIN — BUSPIRONE HYDROCHLORIDE 10 MG: 10 TABLET ORAL at 20:34

## 2019-07-26 RX ADMIN — METOPROLOL SUCCINATE 50 MG: 25 TABLET, EXTENDED RELEASE ORAL at 08:35

## 2019-07-26 RX ADMIN — BUSPIRONE HYDROCHLORIDE 10 MG: 10 TABLET ORAL at 08:36

## 2019-07-26 NOTE — PROGRESS NOTES
Coverage for Dr Shaheen Beard---      Staffing:    Slept 8 hours. Has been mainly staying in her room,c/o feeling stressed and hopeless. visit with mother went well. Staff report she seems less anxious today. Medical:    VSWS:pr=550/65,p=77,t=97. 9. No side effects form meds. MSE:           When I came to see her she was out in the day area,stiing at table with peers and listening to conversations. Since admission,she feels she is sleeping much better. Pierre Kinsey continues to c/o feeling hopeless,having racing thoughts and hearing voices. Able  to contract against self harm here. She worries what will happen if her mother's health worsens and the pt can't live with her anymore. A:      Tolerating meds well, some glimmers of improvement      P:       cont higher dose of risperdal with the buspar and effexor.

## 2019-07-26 NOTE — BH NOTES
GROUP THERAPY PROGRESS NOTE    Kathie Helton is not participating  in Recreational Therapy. Group time: 45 minutes    Patient did not participate in group despite encouragement to do so.

## 2019-07-26 NOTE — BH NOTES
Kathie Helton  appears calm and cooperative in the milieu interacting with staff and peers. Pt. denies SI,HI and AVH today. Pt ate 100% of dinner and snack. Pt contracts for safety on the unit. Pt.denies any new medical/pain complaints. Pt. Received a visit from her mother, visit went well. Staff encouraged Pt. to participate in treatment plan. Pt agreed. Pt. remain free of falls and provided non skid socks. Staff will continue to monitor Pt. for behavior safety and location.

## 2019-07-26 NOTE — PROGRESS NOTES
Problem: Falls - Risk of  Goal: *Absence of Falls  Description  Document Pavithra Rosario Fall Risk and appropriate interventions in the flowsheet. Pt will be free of falls daily. Outcome: Progressing Towards Goal  Note:   Fall Risk Interventions:  Mobility Interventions: Utilize walker, cane, or other assistive device         Medication Interventions: Teach patient to arise slowly         History of Falls Interventions: Door open when patient unattended         Problem: Depressed Mood (Adult/Pediatric)  Goal: *STG: Remains safe in hospital  Description  Pt will contract for safety and be free of harm daily. Outcome: Progressing Towards Goal     Problem: Depressed Mood (Adult/Pediatric)  Goal: *STG: Complies with medication therapy  Description  Pt will take all medications as prescribed daily. Outcome: Progressing Towards Goal     Pt continues to isolate to self. Minimal interactions with peers and staff. Pt does verbalize that she feels the medications are starting to work and she feels less anxious. Pt denies SI. Pt continues to verbalize feelings of depression. Pt compliant with meals and meds. Rounds maintained Q 15 mins.  Staff will continue to offer a safe and supportive environment

## 2019-07-26 NOTE — BSMART NOTE
SOCIAL WORK GROUP THERAPY PROGRESS NOTE    Group Time:  11am    Group Topic:  Coping Skills    C D Issues    Group Participation:      Pt moderately involved during group discussion but remained attentive. Flat affect, dysphoric mood & very soft spoken. \"Seven Steps\" for taking responsibility for our Happiness was reviewed including commitment to change, self-care, setting limits, goal setting & letting go. Reviewed strategies to keep a \"Journal\" for moods, cognitions, behavior & outcome. She appreciated peers comments on the positive side of outpt tx, which she claims she's never had.

## 2019-07-27 PROCEDURE — 74011250637 HC RX REV CODE- 250/637: Performed by: PSYCHIATRY & NEUROLOGY

## 2019-07-27 PROCEDURE — 65220000003 HC RM SEMIPRIVATE PSYCH

## 2019-07-27 RX ADMIN — RISPERIDONE 2 MG: 2 TABLET ORAL at 21:00

## 2019-07-27 RX ADMIN — BUSPIRONE HYDROCHLORIDE 10 MG: 10 TABLET ORAL at 13:34

## 2019-07-27 RX ADMIN — BUSPIRONE HYDROCHLORIDE 10 MG: 10 TABLET ORAL at 21:00

## 2019-07-27 RX ADMIN — METOPROLOL SUCCINATE 50 MG: 25 TABLET, EXTENDED RELEASE ORAL at 08:25

## 2019-07-27 RX ADMIN — BUSPIRONE HYDROCHLORIDE 10 MG: 10 TABLET ORAL at 08:27

## 2019-07-27 RX ADMIN — VENLAFAXINE HYDROCHLORIDE 75 MG: 75 CAPSULE, EXTENDED RELEASE ORAL at 08:27

## 2019-07-27 RX ADMIN — TRAZODONE HYDROCHLORIDE 100 MG: 100 TABLET ORAL at 21:01

## 2019-07-27 NOTE — PROGRESS NOTES
Problem: Falls - Risk of  Goal: *Absence of Falls  Description  Document Adrianne Staples Fall Risk and appropriate interventions in the flowsheet. Pt will be free of falls daily. Outcome: Progressing Towards Goal  Note:   Fall Risk Interventions:  Mobility Interventions: Utilize walker, cane, or other assistive device         Medication Interventions: Teach patient to arise slowly         History of Falls Interventions: Door open when patient unattended    Aeb pt free of falls this shift     Problem: Depressed Mood (Adult/Pediatric)  Goal: *STG: Remains safe in hospital  Description  Pt will contract for safety and be free of harm daily. Outcome: Progressing Towards Goal  Note:   Aeb pt free of harm this shift  Goal: *STG: Complies with medication therapy  Description  Pt will take all medications as prescribed daily. Outcome: Progressing Towards Goal  Note:   Aeb pt taking all meds as prescribed this shift     Pt has been calm and cooperative. Pt still appears sad and anxious, states she is feeling \"All right\" today. Pt out in the milieu sitting quietly and no interacting much with peers. Pt has been compliant with all meds. Pt has had no complaints and has not required PRNs. Will continue to monitor for safety and provide intervention as necessary.

## 2019-07-27 NOTE — PROGRESS NOTES
Behavioral Health Progress Note    Admit Date: 7/22/2019  Hospital day 4    Vitals :   Patient Vitals for the past 8 hrs:   BP Temp Pulse Resp   07/27/19 0823 126/76 97 °F (36.1 °C) 72 16     Labs:  No results found for this or any previous visit (from the past 24 hour(s)).   Meds:   Current Facility-Administered Medications   Medication Dose Route Frequency    risperiDONE (RisperDAL) tablet 2 mg  2 mg Oral QHS    traZODone (DESYREL) tablet 100 mg  100 mg Oral QHS PRN    busPIRone (BUSPAR) tablet 10 mg  10 mg Oral TID    LORazepam (ATIVAN) tablet 1 mg  1 mg Oral Q6H PRN    LORazepam (ATIVAN) tablet 2 mg  2 mg Oral Q6H PRN    venlafaxine-SR (EFFEXOR-XR) capsule 75 mg  75 mg Oral DAILY WITH BREAKFAST    metoprolol succinate (TOPROL-XL) XL tablet 50 mg  50 mg Oral DAILY    LORazepam (ATIVAN) injection 1 mg  1 mg IntraMUSCular Q4H PRN    haloperidol (HALDOL) tablet 5 mg  5 mg Oral Q6H PRN    haloperidol lactate (HALDOL) injection 5 mg  5 mg IntraMUSCular Q6H PRN    benztropine (COGENTIN) injection 1-2 mg  1-2 mg IntraMUSCular Q4H PRN    benztropine (COGENTIN) tablet 1-2 mg  1-2 mg Oral Q4H PRN    LORazepam (ATIVAN) injection 2 mg  2 mg IntraMUSCular Q4H PRN    nicotine (NICODERM CQ) 14 mg/24 hr patch 1 Patch  1 Patch TransDERmal DAILY PRN      Hospital Problems: Principal Problem:    [facility] (7/22/2019)    Active Problems:    Generalized anxiety disorder (7/22/2019)        Subjective:   Medication side effects: none  none    Mental Status Exam  Sensorium: alert  Orientation: only aware of  time, place and person  Relations: cooperative  Eye Contact: poor  Appearance: is tense  Thought Process: normal rate of thoughts and fair abstract reasoning/computation   Thought Content: delusions, paranoia and hallucinations   Suicidal: ideas   Homicidal: none   Mood: is anxious and unhappy   Affect: constricted  Memory: shows no evidence of impairment     Concentration: fair  Abstraction: concrete  Insight: The patient shows little insight    OR Fair  Judgement: is psychologically impaired OR  Fair    Assessment/Plan:   improved  Nurses report she comes out but does not interact muck. Moves slow, somewhat shaky. Worries about living w/ mother and what pt would do and live if mother were to die. Maxi Mcnulty are negative- \"you won't have a place to live. \" \"You're stupid. \" feels people are staring at her. Hard to fall asleep.    Continue close observation, supportive care

## 2019-07-27 NOTE — BH NOTES
Kathie Helton isolated in bed resting. Pt states she feels better and don't know what she will do if she lost her mother. ,the patient appears calm and cooperative in the milieu interacting with staff and peers. Pt. denies SI,HI and AVH today. Pt ate 100% of dinner and snack. Pt contracts for safety on the unit. Pt.denies any new medical/pain complaints. Staff enncouraged Pt. to participate in treatment plan. Pt agreed. Pt. remain free of falls and provided non skid socks. Staff will continue to monitor Pt. for behavior safety and location.

## 2019-07-28 PROCEDURE — 65220000003 HC RM SEMIPRIVATE PSYCH

## 2019-07-28 PROCEDURE — 74011250637 HC RX REV CODE- 250/637: Performed by: PSYCHIATRY & NEUROLOGY

## 2019-07-28 RX ADMIN — BUSPIRONE HYDROCHLORIDE 10 MG: 10 TABLET ORAL at 14:02

## 2019-07-28 RX ADMIN — BUSPIRONE HYDROCHLORIDE 10 MG: 10 TABLET ORAL at 20:08

## 2019-07-28 RX ADMIN — TRAZODONE HYDROCHLORIDE 100 MG: 100 TABLET ORAL at 20:08

## 2019-07-28 RX ADMIN — RISPERIDONE 2 MG: 2 TABLET ORAL at 20:08

## 2019-07-28 RX ADMIN — VENLAFAXINE HYDROCHLORIDE 75 MG: 75 CAPSULE, EXTENDED RELEASE ORAL at 08:04

## 2019-07-28 RX ADMIN — METOPROLOL SUCCINATE 50 MG: 25 TABLET, EXTENDED RELEASE ORAL at 08:04

## 2019-07-28 RX ADMIN — BUSPIRONE HYDROCHLORIDE 10 MG: 10 TABLET ORAL at 08:04

## 2019-07-28 NOTE — BH NOTES
Pt isolates to self in day area. Pt continues to present with an anxious affect. Pt denies current thoughts of SI but continues to endorse AH that are negative in nature. Pt compliant with meals and meds. Rounds maintained Q 15 mins.  Staff will continue to offer a safe and supportive environment

## 2019-07-28 NOTE — PROGRESS NOTES
Problem: Falls - Risk of  Goal: *Absence of Falls  Description  Document [facility] Fall Risk and appropriate interventions in the flowsheet. Pt will be free of falls daily. Outcome: Progressing Towards Goal  Note:   Patient will continue to remain free of falls daily while hospitalized. Problem: Depressed Mood (Adult/Pediatric)  Goal: *STG: Participates in treatment plan  Description  Pt will take an active role in her treatment daily. Outcome: Progressing Towards Goal  Note:   Patient will participate in treatment daily in order to work on depressed mood. Goal: *STG: Attends activities and groups  Description  Pt will attend at least 3 groups daily. Outcome: Progressing Towards Goal  Note:   Patient will attend scheduled groups and activities daily while hospitalized. Goal: *STG: Complies with medication therapy  Description  Pt will take all medications as prescribed daily. Outcome: Progressing Towards Goal  Note:   Patient will be medication adherent daily while hospitalized. Patient has been visible on the unit during meals and medications. She reports that she feels better today, with no thoughts of self harm or harm towards others. She reports that her mood has improved, but she appears guarded with staff. No current report of A/H,  No behavioral issues.   Will continue to monitor for safety and support

## 2019-07-28 NOTE — PROGRESS NOTES
Behavioral Health Progress Note    Admit Date: 7/22/2019  Hospital day 5    Vitals :   Patient Vitals for the past 8 hrs:   BP Temp Pulse Resp   07/28/19 0730 117/74 97.2 °F (36.2 °C) 72 16     Labs:  No results found for this or any previous visit (from the past 24 hour(s)).   Meds:      Medication Administration Report   Attending Provider: Brent Guaman MD    Allergies: Dilaudid [Hydromorphone (Bulk)], Augmentin [Amoxicillin-pot Clavulanate], Aspirin, Ceclor [Cefaclor], Codeine    Isolation: None   Infection: None   Code Status: Not on file   Advance Care Planning Activity    Service: PSY    Ht: 5' 2\" (1.575 m)   Wt: 51.7 kg (114 lb)   Admission Wt: 51.7 kg (114 lb)    Admission Dx: Severe recurrent depression with psychosis ([facility] Anxiety   Principal Problem: Severe recurrent depression with psychosis ([facility] [address] [F33.3]     BMI: 20.85 kg/m 2   BSA: 1.5 m 2         Medication Administration Report   for Kathie Helton as of 07/28/19 1336     1 Day 3 Days 7 Days 10 Days < Today >    Legend:                          Discontinued    Completed    Future    MAR Hold     Linked           Medications 07/26/19 07/27/19 07/28/19   benztropine (COGENTIN) injection 1-2 mg   Dose: 1-2 mg  Freq: EVERY 4 HOURS AS NEEDED Route: IM  PRN Reason: Extrapyramidal Side Effects  Start: 07/22/19 1543   Order ID: [id number]         benztropine (COGENTIN) tablet 1-2 mg   Dose: 1-2 mg  Freq: EVERY 4 HOURS AS NEEDED Route: PO  PRN Reason: Extrapyramidal Syndrome  Start: 07/22/19 1543   Order ID: [id number]         busPIRone (BUSPAR) tablet 10 mg   Dose: 10 mg  Freq: 3 TIMES DAILY Route: PO  Start: 07/25/19 1100   Order ID: [id number]    08 (10 mg)   13 (10 mg)   20 (10 mg)      08 (10 mg)   13 (10 mg)   21 (10 mg)      08 (10 mg)   14   21        haloperidol (HALDOL) tablet 5 mg   Dose: 5 mg  Freq: EVERY 6 HOURS AS NEEDED Route: PO  PRN Reasons: Psychosis,Agitation  PRN Comment: mild to moderate  Start: 07/22/19 1543   Order ID: [id number] haloperidol lactate (HALDOL) injection 5 mg   Dose: 5 mg  Freq: EVERY 6 HOURS AS NEEDED Route: IM  PRN Reason: Psychosis  PRN Comment: moderate to severe  Start: 07/22/19 1543   Order ID: [id number]         LORazepam (ATIVAN) injection 1 mg   Dose: 1 mg  Freq: EVERY 4 HOURS AS NEEDED Route: IM  PRN Reasons: Agitation,Anxiety  PRN Comment: moderate to severe anxiety or agitation  Start: 07/22/19 1543   Order ID: [id number]         LORazepam (ATIVAN) injection 2 mg   Dose: 2 mg  Freq: EVERY 4 HOURS AS NEEDED Route: IM  PRN Reasons: Agitation,Anxiety  PRN Comment: moderate to severe anxiety or agitation  Start: 07/22/19 1543   Order ID: [id number]         LORazepam (ATIVAN) tablet 1 mg   Dose: 1 mg  Freq: EVERY 6 HOURS AS NEEDED Route: PO  PRN Reasons: Agitation,Anxiety  PRN Comment: mild to moderate anxiety or agitation  Start: 07/24/19 1200   Order ID: [id number]         LORazepam (ATIVAN) tablet 2 mg   Dose: 2 mg  Freq: EVERY 6 HOURS AS NEEDED Route: PO  PRN Reasons: Agitation,Anxiety  PRN Comment: moderate anxiety or agitation  Start: 07/24/19 1102   Order ID: [id number]         metoprolol succinate (TOPROL-XL) XL tablet 50 mg   Dose: 50 mg  Freq: DAILY Route: PO  Start: 07/23/19 1100    Admin Instructions:   May split tablet but DO NOT CHEW OR CRUSH tablet, swallow whole   Order ID: [id number]    08 (50 mg)          08 (50 mg)          08 (50 mg)            nicotine (NICODERM CQ) 14 mg/24 hr patch 1 Patch   Dose: 1 Patch  Freq: DAILY AS NEEDED Route: TD  PRN Comment: smoker  Start: 07/22/19 1543   Order ID: [id number]         risperiDONE (RisperDAL) tablet 2 mg   Dose: 2 mg  Freq: EVERY BEDTIME Route: PO  Start: 07/25/19 2100    Admin Instructions:   Hazardous medications: DO NOT open, crush, break, or chew. Use proper procedures for handling and disposal of hazardous medications.     Order ID: [id number]    15 (2 mg)          21 (2 mg)          21            traZODone (DESYREL) tablet 100 mg   Dose: 100 mg  Freq: BEDTIME PRN Route: PO  PRN Reason: Sleep  Start: 07/25/19 1045   Order ID: [id number]     21 (100 mg)             venlafaxine-SR (EFFEXOR-XR) capsule 75 mg   Dose: 75 mg  Freq: DAILY WITH BREAKFAST Route: PO  Start: 07/25/19 0800    Admin Instructions:   Do not crush, break or chew. Best if given with food   Order ID: [id number]    08 (75 mg)          08 (75 mg)          08 (75 mg)            Discontinued Medications   Medications 07/26/19 07/27/19 07/28/19   LORazepam (ATIVAN) tablet 1 mg   Dose: 1 mg  Freq: EVERY 4 HOURS AS NEEDED Route: PO  PRN Reasons: Agitation,Anxiety  PRN Comment: mild to moderate anxiety or agitation  Start: 07/22/19 1543 End: 07/24/19 1102   Order ID: [id number]         LORazepam (ATIVAN) tablet 2 mg   Dose: 2 mg  Freq: EVERY 4 HOURS AS NEEDED Route: PO  PRN Reasons: Agitation,Anxiety  PRN Comment: moderate anxiety or agitation  Start: 07/22/19 1543 End: 07/24/19 1102   Order ID: [id number]         risperiDONE (RisperDAL) tablet 1 mg   Dose: 1 mg  Freq: EVERY BEDTIME Route: PO  Start: 07/23/19 2100 End: 07/25/19 1046    Admin Instructions:   Hazardous medications: DO NOT open, crush, break, or chew. Use proper procedures for handling and disposal of hazardous medications. Order ID: [id number]         traZODone (DESYREL) tablet 50 mg   Dose: 50 mg  Freq: BEDTIME PRN Route: PO  PRN Reason: Sleep  Start: 07/22/19 1543 End: 07/25/19 1046   Order ID: [id number]         venlafaxine-SR (EFFEXOR-XR) capsule 37.5 mg   Dose: 37.5 mg  Freq: DAILY WITH BREAKFAST Route: PO  Start: 07/23/19 1000 End: 07/24/19 1233    Admin Instructions:   Do not crush, break or chew.  Best if given with food   Order ID: [id number]         Medications 07/26/19 07/27/19 07/28/19         Hospital Problems: Principal Problem:    Se[facility] (7/22/2019)    Active Problems:    Generalized anxiety disorder (7/22/2019)        Subjective:   Medication side effects: none  none    Mental Status Exam  Sensorium: alert  Orientation: only aware of  time, place and person  Relations: cooperative  Eye Contact: appropriate  Appearance: shows no evidence of impairment  Thought Process: normal rate of thoughts and fair abstract reasoning/computation   Thought Content: no evidence of impairment   Suicidal: denies   Homicidal: none   Mood: is euthymic   Affect: stable  Memory: shows no evidence of impairment     Concentration: fair  Abstraction: concrete  Insight: The patient's insight shows no evidence of impairment    OR Fair  Judgement: shows no evidence of impairment OR  Fair    Assessment/Plan:   improved  Staff reports doing better. She says she is less anxious. Asking about leaving this week. Some initial sleep problems, did need the Trazodone as above. Appetite fair. Energy low.  No SI   Continue close observation, supportive care, meds as is

## 2019-07-29 VITALS
SYSTOLIC BLOOD PRESSURE: 116 MMHG | HEIGHT: 62 IN | DIASTOLIC BLOOD PRESSURE: 70 MMHG | HEART RATE: 74 BPM | RESPIRATION RATE: 16 BRPM | WEIGHT: 114 LBS | TEMPERATURE: 97.8 F | BODY MASS INDEX: 20.98 KG/M2 | OXYGEN SATURATION: 99 %

## 2019-07-29 PROCEDURE — 74011250637 HC RX REV CODE- 250/637: Performed by: PSYCHIATRY & NEUROLOGY

## 2019-07-29 RX ORDER — TRAZODONE HYDROCHLORIDE 100 MG/1
100 TABLET ORAL
Qty: 14 TAB | Refills: 1 | Status: SHIPPED | OUTPATIENT
Start: 2019-07-29

## 2019-07-29 RX ORDER — VENLAFAXINE HYDROCHLORIDE 75 MG/1
75 CAPSULE, EXTENDED RELEASE ORAL
Qty: 14 CAP | Refills: 1 | Status: SHIPPED | OUTPATIENT
Start: 2019-07-30

## 2019-07-29 RX ORDER — BUSPIRONE HYDROCHLORIDE 10 MG/1
10 TABLET ORAL 3 TIMES DAILY
Qty: 45 TAB | Refills: 1 | Status: SHIPPED | OUTPATIENT
Start: 2019-07-29

## 2019-07-29 RX ORDER — METOPROLOL SUCCINATE 50 MG/1
50 TABLET, EXTENDED RELEASE ORAL DAILY
Qty: 14 TAB | Refills: 1 | Status: SHIPPED | OUTPATIENT
Start: 2019-07-30

## 2019-07-29 RX ORDER — RISPERIDONE 2 MG/1
2 TABLET, FILM COATED ORAL
Qty: 14 TAB | Refills: 1 | Status: SHIPPED | OUTPATIENT
Start: 2019-07-29

## 2019-07-29 RX ADMIN — METOPROLOL SUCCINATE 50 MG: 25 TABLET, EXTENDED RELEASE ORAL at 08:09

## 2019-07-29 RX ADMIN — VENLAFAXINE HYDROCHLORIDE 75 MG: 75 CAPSULE, EXTENDED RELEASE ORAL at 08:09

## 2019-07-29 RX ADMIN — BUSPIRONE HYDROCHLORIDE 10 MG: 10 TABLET ORAL at 08:09

## 2019-07-29 RX ADMIN — BUSPIRONE HYDROCHLORIDE 10 MG: 10 TABLET ORAL at 14:15

## 2019-07-29 NOTE — PROGRESS NOTES
NUTRITION    Nutrition Screen    RECOMMENDATIONS / PLAN:     - Monitor and encourage po intake. - Continue RD inpatient monitoring and evaluation. NUTRITION DIAGNOSIS & INTERVENTIONS:     [x] Meals/Snacks: modified composition    Nutrition Diagnosis: Predicted inadequate energy intake related to appetite and depression as evidenced by pt with fair meal intake PTA with weight loss per chart. ASSESSMENT:     Hx of depressive and anxiety disorder brought to ED for worsening depression and passive SI without plan. Pt reports good meal intake since admission, tolerating diet. Denies difficulty obtaining food/meals PTA. Average intake adequate to meet patients estimated nutritional needs:   [] Yes     [] No      [x] Unable to determine at this time    Diet: DIET REGULAR    Food Allergies: NKFA  Current Appetite:   [x] Good     [] Fair     [] Poor     [] Other:  Appetite/meal intake prior to admission:   [] Good     [x] Fair     [] Poor     [x] Other: lives with mother; 2 meals/day  Feeding Limitations:  [] Swallowing Difficulty       [] Chewing Difficulty       [] Other   Current Meal Intake: No data found. Gastrointestinal Issues:  [x] Yes: reports hx of IBS  Skin Integrity:  WDL    Pertinent Medications:  Reviewed    Labs:  Reviewed     Anthropometrics:  Ht Readings from Last 1 Encounters:   07/22/19 5' 2\" (1.575 m)       Last 3 Recorded Weights in this Encounter    07/22/19 1557   Weight: 51.7 kg (114 lb)       Body mass index is 20.85 kg/m². Weight History: Pt denies changes in weight hx PTA. Per chart - 16 lbs, 12.3% x year.     Weight Metrics 7/22/2019 10/15/2018 10/7/2018 9/27/2018 8/25/2018 7/2/2018 4/2/2018   Weight 114 lb 107 lb 110 lb 6.4 oz 114 lb 117 lb 130 lb 130 lb   BMI 20.85 kg/m2 20.22 kg/m2 20.86 kg/m2 21.54 kg/m2 22.11 kg/m2 23.78 kg/m2 23.78 kg/m2       Admitting Diagnosis: Severe recurrent depression with psychosis ([facility] [address] [F33.3]  Anxiety [F41.9]  Past Medical History:   Diagnosis Date  Arrhythmia     palpitations  heart murmur    Depression     Headache(784.0)     UTI (urinary tract infection)         Education Needs:        [x] None identified  [] Identified - Not appropriate at this time  []  Identified and addressed - refer to education log  Learning Limitations:   [x] None identified  [] Identified    Cultural, Oriental orthodox & ethnic food preferences identified:  [x] None    [] Yes      ESTIMATED NUTRITION NEEDS:     1252-1461kcal (MSJx1.2-1.4), 42-52 gm protein (0.8-1 gm/kg), 1 mL/kcal  Based on: 52 kg       [x] Actual BW      [] IBW          MONITORING & EVALUATION:     Nutrition Goal(s):   1. Po intake of meals will meet >75% of patient estimated nutritional needs within the next 7 days.   Outcome: [] Met/Ongoing    [] Progressing towards goal    [] Not progressing towards goal    [x] New/Initial goal     Monitor:  [x] Food and beverage intake   [x] Diet order   [x] Nutrition-focused physical findings   [] Weight      Previous Recommendations (for follow-up assessments only):     []   Implemented       []   Not Implemented (RD to address)   [] No Longer Appropriate   [] No Recommendation Made       Discharge Planning: regular diet   [x]  Participated in care planning, discharge planning, & interdisciplinary rounds as appropriate      Lima Urias RD   Pager: [phone]

## 2019-07-29 NOTE — BSMART NOTE
MITRA assessment/Intervention:  SW made contact with patient as she prepared for discharge. Patient denies SI/HI. Patient denies AVH. Discharge complete.     ALLYN [place]

## 2019-07-29 NOTE — BH NOTES
Patient has been in day area more this shift than last shift with this nurse (7/26/2019). Patient has been quiet but interacts appropriately with staff and peers when addressed. Patient is alert x3 and ambulatory. Patient has eaten all meals and has been compliant with scheduled medications. Patient denies thoughts of self harm or harm to others at this time. Patient has all personal belongings and has signed form. Patient armband taken and shredded. Patient has signed all paperwork to include YANNA. Patient has prescriptions to be filled at pharmacy of choice. Patient was discharged to home address and mother provided transportation. Patient was given cane and escorted to reception by T.

## 2019-07-29 NOTE — BH NOTES
RN, Samira Anaya MD present. SW will set up outpatient appointments. Family visited over the weekend. No interactions. Affect brighter and no further hallucinations. Pt to be discharged today.

## 2019-07-29 NOTE — BSMART NOTE
SOCIAL WORK GROUP THERAPY PROGRESS NOTE    Group Time:  1pm    Group Topic:  Coping Skills    Group Participation:     Pt was unavailable for group due to working on d/c plan with nursing staff.

## 2019-07-29 NOTE — DISCHARGE INSTRUCTIONS
***IMPORTANT NUMBERS***        [address] [facility]        [phone]    [address] [place] [address]       [phone]    Suicide Prevention     [phone]        Patient is alert x3 and ambulatory. Patient has copy of discharge papers with follow up appt. Patient has prescriptions to be filled at pharmacy of choice. Patient has all personal belongings and has signed form. Patient denies thoughts of self harm or harm to others at this time. Patient armband taken and shredded. Patient discharged to home address and transportation provided by mother. PATIENT INSTRUCTIONS:    Your health and safety is very important to us; we remind you to commit to safety and recovery. Should you have any thoughts of harming yourself or others please dial 911, utilize your support systems, the coping strategies you have learned as well as the [address] [place] [address] at [phone] or visit their website at https://suicidepreventionlifeline.org/    The following are some additional coping strategies that you can utilize if you start to feel anxious, angry, stressed or depressed    1. Exercise (running, walking, etc.). 2. Write (poetry, stories, journal). 3. Be with other people. 4. Watch a favorite TV show. 5. Practice Mindfulness  6. Watch a funny/favorite movie  7. Do some deep breathing  8. Take a hot shower or relaxing bath. 9. Play with a pet. 10. Help others  11. Read a good book. 12. Listen to music. 13. Try some aromatherapy (candle, lotion, room spray). 14. Meditate. 15. Paint or draw. 16. Rip paper into itty-bitty pieces. 17. Shoot hoops, kick a ball. 18. Hug a pillow or stuffed animal.  19. Dance. 20. Take up a new hobby. 21. [place]. 25. Make a list of blessings in your life. 23. Contact a hotline/ your therapist.  24. Talk to someone close to you. 25. Yoga. 32. [place] [employer] a friend or family member.   32. Make a list of goals for the week/month/year/5 years.

## 2019-08-06 NOTE — DISCHARGE SUMMARY
[facility]  Inpatient Psychiatry   Discharge Summary     Admit date: 7/22/2019    Discharge date and time: 7/29/2019  3:00 PM    Discharge Physician: Marley Shane MD    DISCHARGE DIAGNOSES     Psychiatric Diagnoses:   Patient Active Problem List   Diagnosis Code    Rheumatoid aortitis I01.1    Lumbosacral spondylosis without myelopathy M47.817    Postlaminectomy syndrome, lumbar region M96.1    Fibromyalgia M79.7    Spasm of muscle M62.838    Chronic pain syndrome G89.4    Lumbar degenerative disc disease M51.36    Lumbar radiculopathy M54.16    Urgency incontinence N39.41    Generalized anxiety disorder F41.1    Severe recurrent depression with psychosis ([facility] [address] F33.3       Level of impairment/disability: [facility] Anita Hagan is a 60 y.o. WHITE OR  female with a history of depressive Disorder and Anxiety Disorder who was brought to the ED by her mother due to worsening depression and passive SI without actual intent or plan. Pt was admitted voluntarily. She has a history of MDD and Anxiety but has not had any treatment in almost 9 months. She has no current outpatient mental health provider. Pt feels her mood has worsened to the point where she is unable to cope with daily activities and is not functioning. Auditory hallucinations have increased in intensity and are \"non-stop\" and very bothersome. She therefore decided to seek treatment.     Pt reports she started dealing with depression about 7 years ago when her  left her for another woman after over 36 years of marriage. She had become dependent on him as he never wanted her to work and she had no experience with the work force. Pt says she is still mourning the loss of the relationship and still has dreams that they will get back together someday although he has remarried another woman.  After the divorce she really could not afford to live on her own and went to live with her daughter in [place] for some time. Her daughter is currently going through a separation with her  so pt is not able to live with her now and is living with her 79 y/o mother who is medically frail. Pt reports financial stressors, health stressors and family stressors. She deals with chronic back pain and sciatica, used to take Oxycodone  for several years but has been weaned off and currently is not taking anything for pain.     Depressive symptoms include feelings of hopelessness, helplessness, passive SI, loss of appetite and weight loss, guilt about the loss of her marriage, anhedonia, fatigue and inability to focus. Pt also has auditory hallucinations but no visual hallucinations or paranoia. Pt reports she worries excessive about everything. The anxiety causes tension in her body, headaches and stomach issues. Her mother and daughter are constantly yelling at her for \"being all negative about everything. \" She feels that she worries more than others would in a similar situation.     Pt denies manic symptoms or symptoms consistent with PTSD. She denies past history of sexual or physical abuse.     Pt smokes half PPD of cigarettes. She denies use of alcohol or illicit street drugs. UDS was negative and BAL <3. Hospital Course: Pt admitted and treated with biopsychosocial treatment plan. She received individual, group and medication therapy. She was started on Effexor XR 75mg daily for depression, Risperdal for psychosis, Buspar for anxiety and Trazodone as needed for sleep. She tolerated all medications well without noticeable side effects. Her mood improved gradually during hospital course. She did not endorse suicidal ideation. By day of discharge she was feeling more hopeful, her affect was bright and her anxiety had significantly decreased. She does have some family responsibilities. She will be in charge of taking care of her 5year old granddaughter whose mother is in [place].  Her mother is also having surgery this week so will need her assistance. Pt plans to follow up with outpatient treatment and individual therapy to continue to help with depression. Pt was not a behavior problem on the unit and was not aggressive or agitated. No SI, HI or hallucinations by time of discharge.         DISPOSITION/FOLLOW-UP     Disposition: home    Follow-up Appointments: Follow-up Information     Follow up With Specialties Details Why Contact Info    None    None (395) Patient stated that they have no PCP      In [facility]   On 8/1/2019 3:00pm appointment with Tawnya Raines LCSW for therapy. [address] [place] [address] Lakshmi Bradley, [address] [address]  [phone]:[phone]  [phone]:U    In [facility]  On 8/8/2019 4:30pm appointment with Hans Mccollum NP for medication management. Same as above            MEDICATION CHANGES   Outpatient medications:  No current facility-administered medications on file prior to encounter. No current outpatient medications on file prior to encounter.          Medications discontinued during hospitalization:  Medications Discontinued During This Encounter   Medication Reason    LORazepam (ATIVAN) tablet 1 mg     LORazepam (ATIVAN) tablet 2 mg     venlafaxine-SR (EFFEXOR-XR) capsule 37.5 mg     risperiDONE (RisperDAL) tablet 1 mg     traZODone (DESYREL) tablet 50 mg     MULTIVITS,STRESS FORMULA (STRESSTABS PO) Discontinued at Discharge    metoprolol-XL (TOPROL XL) 50 mg XL tablet Discontinued at Discharge    clonazePAM (KLONOPIN) 1 mg tablet Discontinued at Discharge    OTHER Discontinued at Discharge    CRANBERRY EXTRACT (CRANBERRY PO) Discontinued at Discharge    sertraline (ZOLOFT) 25 mg tablet Discontinued at Discharge    gabapentin (NEURONTIN) 600 mg tablet Discontinued at Discharge    mirtazapine (REMERON) 45 mg tablet Discontinued at Discharge    docusate sodium (DOCUSOFT S) 100 mg capsule Discontinued at Discharge    dicyclomine (BENTYL) 20 mg tablet Discontinued at Discharge    topiramate (TOPAMAX) 50 mg tablet Discontinued at Discharge    haloperidol (HALDOL) 0.5 mg tablet Discontinued at Discharge    linaclotide (LINZESS) 145 mcg cap capsule Discontinued at Discharge    benzonatate (TESSALON) 200 mg capsule Discontinued at Discharge    hydrochlorothiazide (HYDRODIURIL) 12.5 mg tablet Discontinued at Discharge    carisoprodol (SOMA) 350 mg tablet Discontinued at Discharge    solifenacin (VESICARE) 10 mg tablet Discontinued at Discharge    potassium chloride SA (MICRO-K) 10 mEq capsule Discontinued at Discharge    FLUoxetine (PROZAC) 20 mg tablet Discontinued at Discharge    busPIRone (BUSPAR) tablet 10 mg Patient Discharge    traZODone (DESYREL) tablet 100 mg Patient Discharge    risperiDONE (RisperDAL) tablet 2 mg Patient Discharge    venlafaxine-SR [facility] capsule 75 mg Patient Discharge    LORazepam (ATIVAN) tablet 2 mg Patient Discharge    LORazepam (ATIVAN) tablet 1 mg Patient Discharge    metoprolol succinate (TOPROL-XL) XL tablet 50 mg Patient Discharge    nicotine (NICODERM CQ) 14 mg/24 hr patch 1 Patch Patient Discharge    LORazepam (ATIVAN) injection 2 mg Patient Discharge    benztropine (COGENTIN) tablet 1-2 mg Patient Discharge    benztropine (COGENTIN) injection 1-2 mg Patient Discharge    haloperidol lactate (HALDOL) injection 5 mg Patient Discharge    haloperidol (HALDOL) tablet 5 mg Patient Discharge    LORazepam (ATIVAN) injection 1 mg Patient Discharge         Discharged medication:  Discharge Medication List as of 7/29/2019  2:18 PM      START taking these medications    Details   busPIRone (BUSPAR) 10 mg tablet Take 1 Tab by mouth three (3) times daily. Indications: Repeated Episodes of Anxiety, Print, Disp-45 Tab, R-1      risperiDONE (RISPERDAL) 2 mg tablet Take 1 Tab by mouth nightly. Indications:  Additional Medications to Treat Depression, Print, Disp-14 Tab, R-1      traZODone (DESYREL) 100 mg tablet Take 1 Tab by mouth nightly as needed for Sleep. Indications: insomnia associated with depression, Print, Disp-14 Tab, R-1      venlafaxine-SR (EFFEXOR-XR) 75 mg capsule Take 1 Cap by mouth daily (with breakfast). Indications: Repeated Episodes of Anxiety, major depressive disorder, Print, Disp-14 Cap, R-1         CONTINUE these medications which have CHANGED    Details   metoprolol succinate (TOPROL-XL) 50 mg XL tablet Take 1 Tab by mouth daily.  Indications: high blood pressure, Print, Disp-14 Tab, R-1         STOP taking these medications       FLUoxetine (PROZAC) 20 mg tablet Comments:   Reason for Stopping:         solifenacin (VESICARE) 10 mg tablet Comments:   Reason for Stopping:         potassium chloride SA (MICRO-K) 10 mEq capsule Comments:   Reason for Stopping:         carisoprodol (SOMA) 350 mg tablet Comments:   Reason for Stopping:         hydrochlorothiazide (HYDRODIURIL) 12.5 mg tablet Comments:   Reason for Stopping:         linaclotide (LINZESS) 145 mcg cap capsule Comments:   Reason for Stopping:         benzonatate (TESSALON) 200 mg capsule Comments:   Reason for Stopping:         topiramate (TOPAMAX) 50 mg tablet Comments:   Reason for Stopping:         haloperidol (HALDOL) 0.5 mg tablet Comments:   Reason for Stopping:         dicyclomine (BENTYL) 20 mg tablet Comments:   Reason for Stopping:         mirtazapine (REMERON) 45 mg tablet Comments:   Reason for Stopping:         docusate sodium (DOCUSOFT S) 100 mg capsule Comments:   Reason for Stopping:         sertraline (ZOLOFT) 25 mg tablet Comments:   Reason for Stopping:         gabapentin (NEURONTIN) 600 mg tablet Comments:   Reason for Stopping:         CRANBERRY EXTRACT (CRANBERRY PO) Comments:   Reason for Stopping:         clonazePAM (KLONOPIN) 1 mg tablet Comments:   Reason for Stopping:         OTHER Comments:   Reason for Stopping:         MULTIVITS,STRESS FORMULA (STRESSTABS PO) Comments:   Reason for Stopping:               Instructions, risks (black box warning), benefits and side effects (EPS, TD, NMS) were discussed in detail prior to discharge. Patient denied any adverse medication side effects prior to discharge. LABS/IMAGING DURING ADMISSION     Results for orders placed or performed during the hospital encounter of 07/22/19   DRUG SCREEN, URINE   Result Value Ref Range    BENZODIAZEPINES NEGATIVE  NEG      BARBITURATES NEGATIVE  NEG      THC (TH-CANNABINOL) NEGATIVE  NEG      OPIATES NEGATIVE  NEG      PCP(PHENCYCLIDINE) NEGATIVE  NEG      COCAINE NEGATIVE  NEG      AMPHETAMINES NEGATIVE  NEG      METHADONE NEGATIVE  NEG      HDSCOM (NOTE)    HCG URINE, QL   Result Value Ref Range    HCG urine, QL NEGATIVE  NEG     CBC WITH AUTOMATED DIFF   Result Value Ref Range    WBC 11.7 4.6 - 13.2 K/uL    RBC 5.28 4.20 - 5.30 M/uL    HGB 16.0 12.0 - 16.0 g/dL    HCT 46.8 (H) 35.0 - 45.0 %    MCV 88.6 74.0 - 97.0 FL    MCH 30.3 24.0 - 34.0 PG    MCHC 34.2 31.0 - 37.0 g/dL    RDW 14.4 11.6 - 14.5 %    PLATELET 006 (H) 112 - 420 K/uL    MPV 10.7 9.2 - 11.8 FL    NEUTROPHILS 65 40 - 73 %    LYMPHOCYTES 26 21 - 52 %    MONOCYTES 5 3 - 10 %    EOSINOPHILS 4 0 - 5 %    BASOPHILS 0 0 - 2 %    ABS. NEUTROPHILS 7.6 1.8 - 8.0 K/UL    ABS. LYMPHOCYTES 3.0 0.9 - 3.6 K/UL    ABS. MONOCYTES 0.6 0.05 - 1.2 K/UL    ABS. EOSINOPHILS 0.4 0.0 - 0.4 K/UL    ABS.  BASOPHILS 0.1 0.0 - 0.1 K/UL    DF AUTOMATED     METABOLIC PANEL, BASIC   Result Value Ref Range    Sodium 143 136 - 145 mmol/L    Potassium 3.7 3.5 - 5.5 mmol/L    Chloride 109 100 - 111 mmol/L    CO2 29 21 - 32 mmol/L    Anion gap 5 3.0 - 18 mmol/L    Glucose 100 (H) 74 - 99 mg/dL    BUN 10 7.0 - 18 MG/DL    Creatinine 0.62 0.6 - 1.3 MG/DL    BUN/Creatinine ratio 16 12 - 20      GFR est AA >60 >60 ml/min/1.73m2    GFR est non-AA >60 >60 ml/min/1.73m2    Calcium 9.3 8.5 - 10.1 MG/DL   ETHYL ALCOHOL   Result Value Ref Range    ALCOHOL(ETHYL),SERUM <3 0 - 3 MG/DL   SALICYLATE   Result Value Ref Range    Salicylate level 2.9 2.8 - 20.0 MG/DL   ACETAMINOPHEN   Result Value Ref Range    Acetaminophen level <2 (L) 10.0 - 30.0 ug/mL   TSH 3RD GENERATION   Result Value Ref Range    TSH 1.44 0.36 - 3.74 uIU/mL   HEMOGLOBIN A1C W/O EAG   Result Value Ref Range    Hemoglobin A1c 5.7 (H) 4.2 - 5.6 %   LIPID PANEL   Result Value Ref Range    LIPID PROFILE          Cholesterol, total 155 <200 MG/DL    Triglyceride 163 (H) <150 MG/DL    HDL Cholesterol 41 40 - 60 MG/DL    LDL, calculated 81.4 0 - 100 MG/DL    VLDL, calculated 32.6 MG/DL    CHOL/HDL Ratio 3.8 0 - 5.0     EKG, 12 LEAD, INITIAL   Result Value Ref Range    Ventricular Rate 81 BPM    Atrial Rate 81 BPM    P-R Interval 126 ms    QRS Duration 80 ms    Q-T Interval 374 ms    QTC Calculation (Bezet) 434 ms    Calculated P Axis 20 degrees    Calculated R Axis 36 degrees    Calculated T Axis 28 degrees    Diagnosis       Normal sinus rhythm  Normal ECG    Confirmed by Zander Mock MD, Damian ([id number]) on 7/23/2019 1:00:19 PM          DISCHARGE MENTAL STATUS EVALUATION     Appearance/Hygiene 60 y.o. WHITE OR  female  Hygiene: good   Attitude/Behavior/Social Relatedness Appropriate   Musculoskeletal Gait/Station: appropriate  Tone (flaccid, cogwheeling, spastic): not assessed  Psychomotor (hyperkinetic, hypokinetic): calm  Involuntary movements (tics, dyskinesias, akathisa, stereotypies): none   Speech   Rate, rhythm, volume, fluency and articulation are appropriate   Mood   euthyic   Affect    congruent   Thought Process Linear and goal directed   Thought Content and Perceptual Disturbances Denies self-injurious behavior (SIB), suicidal ideation (SI), aggressive behavior or homicidal ideation (HI)    Denies auditory and visual hallucinations   Sensorium and Cognition  Grossly intact   Insight  fair   Judgment fair       SUICIDE RISK ASSESSMENT     [] Admission  [x] Discharge     Key Factors:   Current admission precipitated by suicide attempt?   []  Yes     2    [x]  No     1     Suicide Attempt History [] Past attempts of high lethality    2 []  Past attempts of low lethality    1 [x]  No previous attempts       0   Suicidal Ideation []  Constant suicidal thoughts      2 []  Intermittent or fleeting suicidal  thoughts  1 [x]  Denies current suicidal thoughts    0   Suicide Plan   []  Has plan with actual OR potential access to planned method    2 []  Has plan without access to planned method      1 [x]  No plan            0   Plan Lethality []  Highly lethal plan (Carbon monoxide, gun, hanging, jumping)    2 []  Moderate lethality of plan          1 [x]  Low lethality of plan (biting, head banging, superficial scratching, pillow over face)  0   Safety Plan Agreement  []  Unwilling OR unable to agree due to impaired reality testing   2   []  Patient is ambivalent and/or guarded      1 [x]  Reliably agrees        0   Current Morbid Thoughts (reunion fantasies, preoccupations with death) []  Constantly     2     []  Frequently    1 [x]  Rarely    0   Elopement Risk  []  High risk     2 []  Moderate risk    1 [x]   Low risk    0   Symptoms    []  Hopeless  []  Helpless  []  Anhedonia   []  Guilt/shame  []  Anger/rage  []  Anxiety  []  Insomnia   []  Agitation   []  Impulsivity  []  5-6 symptoms present    2 []  3-4 symptoms present    1  [x]  0-2 symptoms present    0     Scoring Key:  10 or higher = Imminent Risk (consider 1:1)  4 - 9 = Moderate Risk (consider q 15 minute observation)Attended alcohol, tobacco, prescription and other drug psychoeducation group.   0 - 3 = Low Risk (consider q 30 minute observation)    Total Score: 1  ------------------------------------------------------------------------------------------------------------------  PLEASE ADDRESS THE FOLLOWING 5 ISSUES     Physician's Subjective Appraisal of Risk (check one):  []  Patient replies not trustworthy: several non-verbal cues. []  Patient replies questionable: trustworthy: at least 1 non-verbal cue.   [x]  Patient replies appear trustworthy. Family History of Suicide? []  Yes  [x]  No    Protective measures (select all that apply):  []  Successful past responses to stress  []  Spiritual/Jehovah's witness beliefs  [x]  Capacity for reality testing  []  Positive therapeutic relationships  [x]  Social supports/connections  [x]  Positive coping skills  []  Frustration tolerance/optimism  []  Children or pets in the home  [x]  Sense of responsibility to family  [x]  Agrees to treatment plan and follow up    Others (list):    High Risk Diagnoses (select all that apply):  [x]  Depression/Bipolar Disorder  []  Dual Diagnosis  []  Cardiovascular Disease  []  Schizophrenia  []  Chronic Pain  []  Epilepsy  []  Cancer  []  Personality Disorder  []  HIV/AIDS  []  Multiple Sclerosis    Dangerousness Assessment (Suicide, homicide, property destruction. ..)    Risk Factors reviewed and risk assessed to be:  [x] low  [] low-moderate  [] moderate   [] moderate-high  [] high     Protection factors reviewed and risk assessed to be:  [x] low  [] low-moderate  [] moderate   [] moderate-high  [] high     Response to treatment and risk assessed to be:  [x] low  [] low-moderate  [] moderate   [] moderate-high  [] high     Support reviewed and risk assessed to be:  [x] low  [] low-moderate  [] moderate   [] moderate-high  [] high     Acceptance of Discharge and outpatient treatment reviewed and risk assessed to be:    [x] low  [] low-moderate  [] moderate   [] moderate-high  [] high   Overall risk assessed to be:  [x] low  [] low-moderate  [] moderate   [] moderate-high  [] high     Completion of discharge was greater than 30 minutes. Over 50% of today's discharge was geared towards counseling and coordination of care.           Sydney Jack MD  Psychiatry  [facility]

## 2022-03-18 PROBLEM — F33.3 SEVERE RECURRENT DEPRESSION WITH PSYCHOSIS (HCC): Status: ACTIVE | Noted: 2019-07-22

## 2022-03-18 PROBLEM — M54.16 LUMBAR RADICULOPATHY: Status: ACTIVE | Noted: 2017-03-01

## 2022-03-19 PROBLEM — F41.1 GENERALIZED ANXIETY DISORDER: Status: ACTIVE | Noted: 2019-07-22

## 2022-03-19 PROBLEM — N39.41 URGENCY INCONTINENCE: Status: ACTIVE | Noted: 2017-07-03

## 2022-03-20 PROBLEM — M51.36 LUMBAR DEGENERATIVE DISC DISEASE: Status: ACTIVE | Noted: 2017-03-01

## 2023-03-06 ENCOUNTER — APPOINTMENT (OUTPATIENT)
Facility: HOSPITAL | Age: 63
DRG: 720 | End: 2023-03-06
Payer: COMMERCIAL

## 2023-03-06 ENCOUNTER — HOSPITAL ENCOUNTER (INPATIENT)
Facility: HOSPITAL | Age: 63
LOS: 5 days | Discharge: OTHER INSTITUTION | DRG: 720 | End: 2023-03-11
Attending: EMERGENCY MEDICINE | Admitting: INTERNAL MEDICINE
Payer: COMMERCIAL

## 2023-03-06 DIAGNOSIS — N30.00 ACUTE CYSTITIS WITHOUT HEMATURIA: ICD-10-CM

## 2023-03-06 DIAGNOSIS — A41.9 SEPTICEMIA (HCC): Primary | ICD-10-CM

## 2023-03-06 DIAGNOSIS — R59.0 CERVICAL ADENOPATHY: ICD-10-CM

## 2023-03-06 DIAGNOSIS — R77.8 ELEVATED TROPONIN: ICD-10-CM

## 2023-03-06 LAB
ALBUMIN SERPL-MCNC: 4 G/DL (ref 3.4–5)
ALBUMIN/GLOB SERPL: 1.1 (ref 0.8–1.7)
ALP SERPL-CCNC: 107 U/L (ref 45–117)
ALT SERPL-CCNC: 71 U/L (ref 13–56)
AMPHET UR QL SCN: NEGATIVE
ANION GAP SERPL CALC-SCNC: 9 MMOL/L (ref 3–18)
APPEARANCE UR: ABNORMAL
AST SERPL-CCNC: 91 U/L (ref 10–38)
BACTERIA URNS QL MICRO: ABNORMAL /HPF
BARBITURATES UR QL SCN: NEGATIVE
BASOPHILS # BLD: 0.1 K/UL (ref 0–0.1)
BASOPHILS NFR BLD: 1 % (ref 0–2)
BENZODIAZ UR QL: NEGATIVE
BILIRUB SERPL-MCNC: 0.8 MG/DL (ref 0.2–1)
BILIRUB UR QL: NEGATIVE
BUN SERPL-MCNC: 12 MG/DL (ref 7–18)
BUN/CREAT SERPL: 26 (ref 12–20)
CALCIUM SERPL-MCNC: 9.2 MG/DL (ref 8.5–10.1)
CANNABINOIDS UR QL SCN: NEGATIVE
CHLORIDE SERPL-SCNC: 103 MMOL/L (ref 100–111)
CO2 SERPL-SCNC: 26 MMOL/L (ref 21–32)
COCAINE UR QL SCN: NEGATIVE
COLOR UR: YELLOW
CREAT SERPL-MCNC: 0.47 MG/DL (ref 0.6–1.3)
DIFFERENTIAL METHOD BLD: ABNORMAL
EOSINOPHIL # BLD: 0 K/UL (ref 0–0.4)
EOSINOPHIL NFR BLD: 0 % (ref 0–5)
EPITH CASTS URNS QL MICRO: ABNORMAL /LPF (ref 0–5)
ERYTHROCYTE [DISTWIDTH] IN BLOOD BY AUTOMATED COUNT: 17.9 % (ref 11.6–14.5)
ETHANOL SERPL-MCNC: <3 MG/DL (ref 0–3)
FLUAV RNA SPEC QL NAA+PROBE: NOT DETECTED
FLUBV RNA SPEC QL NAA+PROBE: NOT DETECTED
GLOBULIN SER CALC-MCNC: 3.7 G/DL (ref 2–4)
GLUCOSE BLD STRIP.AUTO-MCNC: 95 MG/DL (ref 70–110)
GLUCOSE SERPL-MCNC: 102 MG/DL (ref 74–99)
GLUCOSE UR STRIP.AUTO-MCNC: NEGATIVE MG/DL
GRAN CASTS URNS QL MICRO: ABNORMAL /LPF
HCT VFR BLD AUTO: 43.1 % (ref 35–45)
HGB BLD-MCNC: 14.6 G/DL (ref 12–16)
HGB UR QL STRIP: ABNORMAL
HYALINE CASTS URNS QL MICRO: ABNORMAL /LPF (ref 0–2)
IMM GRANULOCYTES # BLD AUTO: 0.2 K/UL (ref 0–0.04)
IMM GRANULOCYTES NFR BLD AUTO: 1 % (ref 0–0.5)
KETONES UR QL STRIP.AUTO: 80 MG/DL
LACTATE SERPL-SCNC: 2.5 MMOL/L (ref 0.4–2)
LACTATE SERPL-SCNC: 2.9 MMOL/L (ref 0.4–2)
LACTATE SERPL-SCNC: 4.6 MMOL/L (ref 0.4–2)
LEUKOCYTE ESTERASE UR QL STRIP.AUTO: ABNORMAL
LYMPHOCYTES # BLD: 1.4 K/UL (ref 0.9–3.6)
LYMPHOCYTES NFR BLD: 9 % (ref 21–52)
Lab: NORMAL
MCH RBC QN AUTO: 30.7 PG (ref 24–34)
MCHC RBC AUTO-ENTMCNC: 33.9 G/DL (ref 31–37)
MCV RBC AUTO: 90.7 FL (ref 78–100)
METHADONE UR QL: NEGATIVE
MONOCYTES # BLD: 1.1 K/UL (ref 0.05–1.2)
MONOCYTES NFR BLD: 8 % (ref 3–10)
MUCOUS THREADS URNS QL MICRO: ABNORMAL /LPF
NEUTS SEG # BLD: 12.1 K/UL (ref 1.8–8)
NEUTS SEG NFR BLD: 82 % (ref 40–73)
NITRITE UR QL STRIP.AUTO: NEGATIVE
NRBC # BLD: 0 K/UL (ref 0–0.01)
NRBC BLD-RTO: 0 PER 100 WBC
OPIATES UR QL: NEGATIVE
PCP UR QL: NEGATIVE
PH UR STRIP: 5.5 (ref 5–8)
PLATELET # BLD AUTO: 583 K/UL (ref 135–420)
PMV BLD AUTO: 9.6 FL (ref 9.2–11.8)
POTASSIUM SERPL-SCNC: 3.6 MMOL/L (ref 3.5–5.5)
PROCALCITONIN SERPL-MCNC: <0.05 NG/ML
PROT SERPL-MCNC: 7.7 G/DL (ref 6.4–8.2)
PROT UR STRIP-MCNC: 100 MG/DL
RBC # BLD AUTO: 4.75 M/UL (ref 4.2–5.3)
RBC #/AREA URNS HPF: ABNORMAL /HPF (ref 0–5)
SARS-COV-2 RNA RESP QL NAA+PROBE: NOT DETECTED
SODIUM SERPL-SCNC: 138 MMOL/L (ref 136–145)
SP GR UR REFRACTOMETRY: 1.02 (ref 1–1.03)
T4 FREE SERPL-MCNC: 1 NG/DL (ref 0.7–1.5)
TROPONIN I SERPL HS-MCNC: 86 NG/L (ref 0–54)
TROPONIN I SERPL HS-MCNC: 89 NG/L (ref 0–54)
TSH SERPL DL<=0.05 MIU/L-ACNC: 2.79 UIU/ML (ref 0.36–3.74)
UROBILINOGEN UR QL STRIP.AUTO: 1 EU/DL (ref 0.2–1)
WBC # BLD AUTO: 14.9 K/UL (ref 4.6–13.2)
WBC URNS QL MICRO: ABNORMAL /HPF (ref 0–4)

## 2023-03-06 PROCEDURE — 87636 SARSCOV2 & INF A&B AMP PRB: CPT

## 2023-03-06 PROCEDURE — 73030 X-RAY EXAM OF SHOULDER: CPT

## 2023-03-06 PROCEDURE — 70450 CT HEAD/BRAIN W/O DYE: CPT

## 2023-03-06 PROCEDURE — 99285 EMERGENCY DEPT VISIT HI MDM: CPT | Performed by: EMERGENCY MEDICINE

## 2023-03-06 PROCEDURE — 84439 ASSAY OF FREE THYROXINE: CPT

## 2023-03-06 PROCEDURE — 1100000000 HC RM PRIVATE

## 2023-03-06 PROCEDURE — 84145 PROCALCITONIN (PCT): CPT

## 2023-03-06 PROCEDURE — 93005 ELECTROCARDIOGRAM TRACING: CPT | Performed by: EMERGENCY MEDICINE

## 2023-03-06 PROCEDURE — 71045 X-RAY EXAM CHEST 1 VIEW: CPT

## 2023-03-06 PROCEDURE — 74176 CT ABD & PELVIS W/O CONTRAST: CPT

## 2023-03-06 PROCEDURE — 82962 GLUCOSE BLOOD TEST: CPT

## 2023-03-06 PROCEDURE — 72125 CT NECK SPINE W/O DYE: CPT

## 2023-03-06 PROCEDURE — 87040 BLOOD CULTURE FOR BACTERIA: CPT

## 2023-03-06 PROCEDURE — 83605 ASSAY OF LACTIC ACID: CPT

## 2023-03-06 PROCEDURE — 80053 COMPREHEN METABOLIC PANEL: CPT

## 2023-03-06 PROCEDURE — 83036 HEMOGLOBIN GLYCOSYLATED A1C: CPT

## 2023-03-06 PROCEDURE — 2580000003 HC RX 258: Performed by: PHYSICIAN ASSISTANT

## 2023-03-06 PROCEDURE — 80307 DRUG TEST PRSMV CHEM ANLYZR: CPT

## 2023-03-06 PROCEDURE — 6370000000 HC RX 637 (ALT 250 FOR IP): Performed by: INTERNAL MEDICINE

## 2023-03-06 PROCEDURE — 87086 URINE CULTURE/COLONY COUNT: CPT

## 2023-03-06 PROCEDURE — 84443 ASSAY THYROID STIM HORMONE: CPT

## 2023-03-06 PROCEDURE — 84484 ASSAY OF TROPONIN QUANT: CPT

## 2023-03-06 PROCEDURE — 82077 ASSAY SPEC XCP UR&BREATH IA: CPT

## 2023-03-06 PROCEDURE — 85025 COMPLETE CBC W/AUTO DIFF WBC: CPT

## 2023-03-06 PROCEDURE — 6360000002 HC RX W HCPCS: Performed by: PHYSICIAN ASSISTANT

## 2023-03-06 PROCEDURE — 81001 URINALYSIS AUTO W/SCOPE: CPT

## 2023-03-06 PROCEDURE — 99222 1ST HOSP IP/OBS MODERATE 55: CPT | Performed by: INTERNAL MEDICINE

## 2023-03-06 PROCEDURE — 87150 DNA/RNA AMPLIFIED PROBE: CPT

## 2023-03-06 PROCEDURE — 2580000003 HC RX 258: Performed by: INTERNAL MEDICINE

## 2023-03-06 RX ORDER — SODIUM CHLORIDE 0.9 % (FLUSH) 0.9 %
5-40 SYRINGE (ML) INJECTION PRN
Status: DISCONTINUED | OUTPATIENT
Start: 2023-03-06 | End: 2023-03-11 | Stop reason: HOSPADM

## 2023-03-06 RX ORDER — 0.9 % SODIUM CHLORIDE 0.9 %
1000 INTRAVENOUS SOLUTION INTRAVENOUS ONCE
Status: COMPLETED | OUTPATIENT
Start: 2023-03-06 | End: 2023-03-06

## 2023-03-06 RX ORDER — ACETAMINOPHEN 325 MG/1
650 TABLET ORAL EVERY 6 HOURS PRN
Status: DISCONTINUED | OUTPATIENT
Start: 2023-03-06 | End: 2023-03-11 | Stop reason: HOSPADM

## 2023-03-06 RX ORDER — ZOLPIDEM TARTRATE 10 MG/1
TABLET ORAL
Status: ON HOLD | COMMUNITY
Start: 2023-02-12 | End: 2023-03-11 | Stop reason: HOSPADM

## 2023-03-06 RX ORDER — M-VIT,TX,IRON,MINS/CALC/FOLIC 27MG-0.4MG
1 TABLET ORAL DAILY
Status: DISCONTINUED | OUTPATIENT
Start: 2023-03-07 | End: 2023-03-11 | Stop reason: HOSPADM

## 2023-03-06 RX ORDER — SODIUM CHLORIDE 0.9 % (FLUSH) 0.9 %
5-40 SYRINGE (ML) INJECTION EVERY 12 HOURS SCHEDULED
Status: DISCONTINUED | OUTPATIENT
Start: 2023-03-06 | End: 2023-03-11 | Stop reason: HOSPADM

## 2023-03-06 RX ORDER — RISPERIDONE 1 MG/1
TABLET ORAL
Status: ON HOLD | COMMUNITY
Start: 2023-02-12 | End: 2023-03-11 | Stop reason: HOSPADM

## 2023-03-06 RX ORDER — POLYETHYLENE GLYCOL 3350 17 G/17G
17 POWDER, FOR SOLUTION ORAL DAILY PRN
Status: DISCONTINUED | OUTPATIENT
Start: 2023-03-06 | End: 2023-03-11 | Stop reason: HOSPADM

## 2023-03-06 RX ORDER — BUSPIRONE HYDROCHLORIDE 5 MG/1
10 TABLET ORAL 2 TIMES DAILY
Status: DISCONTINUED | OUTPATIENT
Start: 2023-03-06 | End: 2023-03-11 | Stop reason: HOSPADM

## 2023-03-06 RX ORDER — ENOXAPARIN SODIUM 100 MG/ML
40 INJECTION SUBCUTANEOUS DAILY
Status: DISCONTINUED | OUTPATIENT
Start: 2023-03-07 | End: 2023-03-11 | Stop reason: HOSPADM

## 2023-03-06 RX ORDER — ONDANSETRON 2 MG/ML
4 INJECTION INTRAMUSCULAR; INTRAVENOUS EVERY 6 HOURS PRN
Status: DISCONTINUED | OUTPATIENT
Start: 2023-03-06 | End: 2023-03-11 | Stop reason: HOSPADM

## 2023-03-06 RX ORDER — FAMOTIDINE 20 MG/1
20 TABLET, FILM COATED ORAL 2 TIMES DAILY
Status: DISCONTINUED | OUTPATIENT
Start: 2023-03-06 | End: 2023-03-11 | Stop reason: HOSPADM

## 2023-03-06 RX ORDER — SODIUM CHLORIDE, SODIUM LACTATE, POTASSIUM CHLORIDE, AND CALCIUM CHLORIDE .6; .31; .03; .02 G/100ML; G/100ML; G/100ML; G/100ML
30 INJECTION, SOLUTION INTRAVENOUS ONCE
Status: COMPLETED | OUTPATIENT
Start: 2023-03-06 | End: 2023-03-07

## 2023-03-06 RX ORDER — ASPIRIN 81 MG/1
81 TABLET, CHEWABLE ORAL DAILY
Status: DISCONTINUED | OUTPATIENT
Start: 2023-03-06 | End: 2023-03-11 | Stop reason: HOSPADM

## 2023-03-06 RX ORDER — SERTRALINE HYDROCHLORIDE 100 MG/1
TABLET, FILM COATED ORAL
Status: ON HOLD | COMMUNITY
Start: 2023-02-23 | End: 2023-03-11 | Stop reason: HOSPADM

## 2023-03-06 RX ORDER — BUSPIRONE HYDROCHLORIDE 10 MG/1
TABLET ORAL
COMMUNITY
Start: 2023-02-27

## 2023-03-06 RX ORDER — SODIUM CHLORIDE 9 MG/ML
INJECTION, SOLUTION INTRAVENOUS PRN
Status: DISCONTINUED | OUTPATIENT
Start: 2023-03-06 | End: 2023-03-11 | Stop reason: HOSPADM

## 2023-03-06 RX ORDER — ONDANSETRON 4 MG/1
4 TABLET, ORALLY DISINTEGRATING ORAL EVERY 8 HOURS PRN
Status: DISCONTINUED | OUTPATIENT
Start: 2023-03-06 | End: 2023-03-11 | Stop reason: HOSPADM

## 2023-03-06 RX ORDER — ACETAMINOPHEN 650 MG/1
650 SUPPOSITORY RECTAL EVERY 6 HOURS PRN
Status: DISCONTINUED | OUTPATIENT
Start: 2023-03-06 | End: 2023-03-11 | Stop reason: HOSPADM

## 2023-03-06 RX ADMIN — CEFTRIAXONE 1000 MG: 1 INJECTION, POWDER, FOR SOLUTION INTRAMUSCULAR; INTRAVENOUS at 20:08

## 2023-03-06 RX ADMIN — FAMOTIDINE 20 MG: 20 TABLET ORAL at 20:07

## 2023-03-06 RX ADMIN — SODIUM CHLORIDE 1000 ML: 9 INJECTION, SOLUTION INTRAVENOUS at 20:07

## 2023-03-06 RX ADMIN — SODIUM CHLORIDE, POTASSIUM CHLORIDE, SODIUM LACTATE AND CALCIUM CHLORIDE 1000 ML: 600; 310; 30; 20 INJECTION, SOLUTION INTRAVENOUS at 22:01

## 2023-03-06 RX ADMIN — SODIUM CHLORIDE, PRESERVATIVE FREE 10 ML: 5 INJECTION INTRAVENOUS at 20:07

## 2023-03-06 RX ADMIN — ASPIRIN 81 MG CHEWABLE TABLET 81 MG: 81 TABLET CHEWABLE at 20:07

## 2023-03-06 RX ADMIN — BUSPIRONE HYDROCHLORIDE 10 MG: 5 TABLET ORAL at 23:51

## 2023-03-06 ASSESSMENT — ENCOUNTER SYMPTOMS
DIARRHEA: 0
SORE THROAT: 0
VOMITING: 0
ABDOMINAL PAIN: 0
SHORTNESS OF BREATH: 0

## 2023-03-06 NOTE — ED TRIAGE NOTES
Patient presents to the ED for generalized weakness and frequent fall. Patient upon initial assessment A&Ox4 without any distress noted nor voiced. Pt states that she has been week for over 2 weeks and lately have been experiencing falls. Pt noted to be tachy and patient has visible shivering, no fever present. IV placed and labs drawn. Will continue to anticipate orders.

## 2023-03-07 ENCOUNTER — APPOINTMENT (OUTPATIENT)
Facility: HOSPITAL | Age: 63
DRG: 720 | End: 2023-03-07
Payer: COMMERCIAL

## 2023-03-07 PROBLEM — R26.9 GAIT DIFFICULTY: Status: ACTIVE | Noted: 2023-03-07

## 2023-03-07 PROBLEM — G25.0 ESSENTIAL TREMOR: Status: ACTIVE | Noted: 2023-03-07

## 2023-03-07 LAB
ANION GAP SERPL CALC-SCNC: 9 MMOL/L (ref 3–18)
BASOPHILS # BLD: 0.1 K/UL (ref 0–0.1)
BASOPHILS NFR BLD: 1 % (ref 0–2)
BUN SERPL-MCNC: 10 MG/DL (ref 7–18)
BUN/CREAT SERPL: 29 (ref 12–20)
CALCIUM SERPL-MCNC: 8.3 MG/DL (ref 8.5–10.1)
CHLORIDE SERPL-SCNC: 107 MMOL/L (ref 100–111)
CHOLEST SERPL-MCNC: 158 MG/DL
CO2 SERPL-SCNC: 25 MMOL/L (ref 21–32)
CREAT SERPL-MCNC: 0.35 MG/DL (ref 0.6–1.3)
DIFFERENTIAL METHOD BLD: ABNORMAL
ECHO AO ROOT DIAM: 2.2 CM
ECHO AO ROOT INDEX: 1.31 CM/M2
ECHO AV AREA PEAK VELOCITY: 1.6 CM2
ECHO AV AREA VTI: 1.7 CM2
ECHO AV AREA/BSA PEAK VELOCITY: 1 CM2/M2
ECHO AV AREA/BSA VTI: 1 CM2/M2
ECHO AV MEAN GRADIENT: 3 MMHG
ECHO AV MEAN VELOCITY: 0.9 M/S
ECHO AV PEAK GRADIENT: 6 MMHG
ECHO AV PEAK VELOCITY: 1.2 M/S
ECHO AV VELOCITY RATIO: 0.67
ECHO AV VTI: 20.4 CM
ECHO BSA: 1.69 M2
ECHO LA VOL 2C: 27 ML (ref 22–52)
ECHO LA VOL 4C: 24 ML (ref 22–52)
ECHO LA VOL BP: 26 ML (ref 22–52)
ECHO LA VOL/BSA BIPLANE: 15 ML/M2 (ref 16–34)
ECHO LA VOLUME AREA LENGTH: 28 ML
ECHO LA VOLUME INDEX A2C: 16 ML/M2 (ref 16–34)
ECHO LA VOLUME INDEX A4C: 14 ML/M2 (ref 16–34)
ECHO LA VOLUME INDEX AREA LENGTH: 17 ML/M2 (ref 16–34)
ECHO LV E' LATERAL VELOCITY: 6 CM/S
ECHO LV E' SEPTAL VELOCITY: 7 CM/S
ECHO LV FRACTIONAL SHORTENING: 28 % (ref 28–44)
ECHO LV INTERNAL DIMENSION DIASTOLE INDEX: 3.21 CM/M2
ECHO LV INTERNAL DIMENSION DIASTOLIC: 5.4 CM (ref 3.9–5.3)
ECHO LV INTERNAL DIMENSION SYSTOLIC INDEX: 2.32 CM/M2
ECHO LV INTERNAL DIMENSION SYSTOLIC: 3.9 CM
ECHO LV IVSD: 1.2 CM (ref 0.6–0.9)
ECHO LV MASS 2D: 264.4 G (ref 67–162)
ECHO LV MASS INDEX 2D: 157.4 G/M2 (ref 43–95)
ECHO LV POSTERIOR WALL DIASTOLIC: 1.2 CM (ref 0.6–0.9)
ECHO LV RELATIVE WALL THICKNESS RATIO: 0.44
ECHO LVOT AREA: 2.5 CM2
ECHO LVOT AV VTI INDEX: 0.72
ECHO LVOT DIAM: 1.8 CM
ECHO LVOT MEAN GRADIENT: 2 MMHG
ECHO LVOT PEAK GRADIENT: 3 MMHG
ECHO LVOT PEAK VELOCITY: 0.8 M/S
ECHO LVOT STROKE VOLUME INDEX: 22.3 ML/M2
ECHO LVOT SV: 37.4 ML
ECHO LVOT VTI: 14.7 CM
ECHO MV A VELOCITY: 1.05 M/S
ECHO MV E DECELERATION TIME (DT): 141.8 MS
ECHO MV E VELOCITY: 1.04 M/S
ECHO MV E/A RATIO: 0.99
ECHO MV E/E' LATERAL: 17.33
ECHO MV E/E' RATIO (AVERAGED): 16.1
ECHO MV E/E' SEPTAL: 14.86
ECHO PV MAX VELOCITY: 1.1 M/S
ECHO PV PEAK GRADIENT: 5 MMHG
ECHO RV FREE WALL PEAK S': 17 CM/S
ECHO RV LONGITUDINAL DIMENSION: 2.9 CM
ECHO RV TAPSE: 1.7 CM (ref 1.7–?)
EKG ATRIAL RATE: 115 BPM
EKG DIAGNOSIS: NORMAL
EKG P AXIS: 52 DEGREES
EKG P-R INTERVAL: 130 MS
EKG Q-T INTERVAL: 350 MS
EKG QRS DURATION: 82 MS
EKG QTC CALCULATION (BAZETT): 484 MS
EKG R AXIS: 10 DEGREES
EKG T AXIS: 33 DEGREES
EKG VENTRICULAR RATE: 115 BPM
EOSINOPHIL # BLD: 0.1 K/UL (ref 0–0.4)
EOSINOPHIL NFR BLD: 1 % (ref 0–5)
ERYTHROCYTE [DISTWIDTH] IN BLOOD BY AUTOMATED COUNT: 17.9 % (ref 11.6–14.5)
EST. AVERAGE GLUCOSE BLD GHB EST-MCNC: 108 MG/DL
GLUCOSE SERPL-MCNC: 83 MG/DL (ref 74–99)
HBA1C MFR BLD: 5.4 % (ref 4.2–5.6)
HCT VFR BLD AUTO: 36 % (ref 35–45)
HDLC SERPL-MCNC: 36 MG/DL (ref 40–60)
HDLC SERPL: 4.4 (ref 0–5)
HGB BLD-MCNC: 12.2 G/DL (ref 12–16)
IMM GRANULOCYTES # BLD AUTO: 0.1 K/UL (ref 0–0.04)
IMM GRANULOCYTES NFR BLD AUTO: 1 % (ref 0–0.5)
LACTATE SERPL-SCNC: 1.4 MMOL/L (ref 0.4–2)
LDLC SERPL CALC-MCNC: 77.6 MG/DL (ref 0–100)
LIPID PANEL: ABNORMAL
LV EF: 53 %
LVEF MODALITY: ABNORMAL
LYMPHOCYTES # BLD: 1.8 K/UL (ref 0.9–3.6)
LYMPHOCYTES NFR BLD: 16 % (ref 21–52)
MAGNESIUM SERPL-MCNC: 2 MG/DL (ref 1.6–2.6)
MCH RBC QN AUTO: 30.4 PG (ref 24–34)
MCHC RBC AUTO-ENTMCNC: 33.9 G/DL (ref 31–37)
MCV RBC AUTO: 89.8 FL (ref 78–100)
MONOCYTES # BLD: 1 K/UL (ref 0.05–1.2)
MONOCYTES NFR BLD: 9 % (ref 3–10)
NEUTS SEG # BLD: 8.6 K/UL (ref 1.8–8)
NEUTS SEG NFR BLD: 74 % (ref 40–73)
NRBC # BLD: 0 K/UL (ref 0–0.01)
NRBC BLD-RTO: 0 PER 100 WBC
NT PRO BNP: 423 PG/ML (ref 0–900)
PLATELET # BLD AUTO: 490 K/UL (ref 135–420)
PMV BLD AUTO: 9.8 FL (ref 9.2–11.8)
POTASSIUM SERPL-SCNC: 2.6 MMOL/L (ref 3.5–5.5)
POTASSIUM SERPL-SCNC: 3.7 MMOL/L (ref 3.5–5.5)
RBC # BLD AUTO: 4.01 M/UL (ref 4.2–5.3)
SODIUM SERPL-SCNC: 141 MMOL/L (ref 136–145)
TRIGL SERPL-MCNC: 222 MG/DL
TROPONIN I SERPL HS-MCNC: 92 NG/L (ref 0–54)
VLDLC SERPL CALC-MCNC: 44.4 MG/DL
WBC # BLD AUTO: 11.7 K/UL (ref 4.6–13.2)

## 2023-03-07 PROCEDURE — 6360000002 HC RX W HCPCS: Performed by: INTERNAL MEDICINE

## 2023-03-07 PROCEDURE — 2580000003 HC RX 258: Performed by: INTERNAL MEDICINE

## 2023-03-07 PROCEDURE — 80061 LIPID PANEL: CPT

## 2023-03-07 PROCEDURE — 85025 COMPLETE CBC W/AUTO DIFF WBC: CPT

## 2023-03-07 PROCEDURE — 36415 COLL VENOUS BLD VENIPUNCTURE: CPT

## 2023-03-07 PROCEDURE — 1100000000 HC RM PRIVATE

## 2023-03-07 PROCEDURE — 97535 SELF CARE MNGMENT TRAINING: CPT

## 2023-03-07 PROCEDURE — 97530 THERAPEUTIC ACTIVITIES: CPT

## 2023-03-07 PROCEDURE — 84484 ASSAY OF TROPONIN QUANT: CPT

## 2023-03-07 PROCEDURE — 6360000004 HC RX CONTRAST MEDICATION: Performed by: HOSPITALIST

## 2023-03-07 PROCEDURE — 6370000000 HC RX 637 (ALT 250 FOR IP): Performed by: INTERNAL MEDICINE

## 2023-03-07 PROCEDURE — 83605 ASSAY OF LACTIC ACID: CPT

## 2023-03-07 PROCEDURE — 84132 ASSAY OF SERUM POTASSIUM: CPT

## 2023-03-07 PROCEDURE — 51798 US URINE CAPACITY MEASURE: CPT

## 2023-03-07 PROCEDURE — 83880 ASSAY OF NATRIURETIC PEPTIDE: CPT

## 2023-03-07 PROCEDURE — 99221 1ST HOSP IP/OBS SF/LOW 40: CPT | Performed by: STUDENT IN AN ORGANIZED HEALTH CARE EDUCATION/TRAINING PROGRAM

## 2023-03-07 PROCEDURE — 83735 ASSAY OF MAGNESIUM: CPT

## 2023-03-07 PROCEDURE — 93306 TTE W/DOPPLER COMPLETE: CPT

## 2023-03-07 PROCEDURE — 97162 PT EVAL MOD COMPLEX 30 MIN: CPT

## 2023-03-07 PROCEDURE — 99232 SBSQ HOSP IP/OBS MODERATE 35: CPT | Performed by: HOSPITALIST

## 2023-03-07 PROCEDURE — 6370000000 HC RX 637 (ALT 250 FOR IP): Performed by: HOSPITALIST

## 2023-03-07 PROCEDURE — 2580000003 HC RX 258: Performed by: HOSPITALIST

## 2023-03-07 PROCEDURE — 97166 OT EVAL MOD COMPLEX 45 MIN: CPT

## 2023-03-07 PROCEDURE — 93306 TTE W/DOPPLER COMPLETE: CPT | Performed by: INTERNAL MEDICINE

## 2023-03-07 PROCEDURE — 70491 CT SOFT TISSUE NECK W/DYE: CPT

## 2023-03-07 PROCEDURE — 70551 MRI BRAIN STEM W/O DYE: CPT

## 2023-03-07 PROCEDURE — 6360000002 HC RX W HCPCS: Performed by: HOSPITALIST

## 2023-03-07 RX ORDER — NADOLOL 20 MG/1
20 TABLET ORAL 2 TIMES DAILY
Status: DISCONTINUED | OUTPATIENT
Start: 2023-03-07 | End: 2023-03-07

## 2023-03-07 RX ORDER — SODIUM CHLORIDE 9 MG/ML
INJECTION, SOLUTION INTRAVENOUS CONTINUOUS
Status: DISCONTINUED | OUTPATIENT
Start: 2023-03-07 | End: 2023-03-08

## 2023-03-07 RX ORDER — POTASSIUM CHLORIDE 7.45 MG/ML
10 INJECTION INTRAVENOUS
Status: COMPLETED | OUTPATIENT
Start: 2023-03-07 | End: 2023-03-07

## 2023-03-07 RX ORDER — POTASSIUM CHLORIDE 7.45 MG/ML
10 INJECTION INTRAVENOUS
Status: DISPENSED | OUTPATIENT
Start: 2023-03-07 | End: 2023-03-07

## 2023-03-07 RX ORDER — POTASSIUM CHLORIDE 20 MEQ/1
20 TABLET, EXTENDED RELEASE ORAL
Status: COMPLETED | OUTPATIENT
Start: 2023-03-07 | End: 2023-03-07

## 2023-03-07 RX ORDER — LORAZEPAM 1 MG/1
1 TABLET ORAL
Status: COMPLETED | OUTPATIENT
Start: 2023-03-07 | End: 2023-03-07

## 2023-03-07 RX ORDER — PROPRANOLOL HYDROCHLORIDE 10 MG/1
20 TABLET ORAL 2 TIMES DAILY
Status: DISCONTINUED | OUTPATIENT
Start: 2023-03-07 | End: 2023-03-11 | Stop reason: HOSPADM

## 2023-03-07 RX ADMIN — SERTRALINE 150 MG: 50 TABLET, FILM COATED ORAL at 08:37

## 2023-03-07 RX ADMIN — FAMOTIDINE 20 MG: 20 TABLET ORAL at 21:30

## 2023-03-07 RX ADMIN — POTASSIUM CHLORIDE 20 MEQ: 1500 TABLET, EXTENDED RELEASE ORAL at 12:02

## 2023-03-07 RX ADMIN — PROPRANOLOL HYDROCHLORIDE 20 MG: 10 TABLET ORAL at 21:30

## 2023-03-07 RX ADMIN — SODIUM CHLORIDE, PRESERVATIVE FREE 10 ML: 5 INJECTION INTRAVENOUS at 22:45

## 2023-03-07 RX ADMIN — BUSPIRONE HYDROCHLORIDE 10 MG: 5 TABLET ORAL at 21:30

## 2023-03-07 RX ADMIN — ENOXAPARIN SODIUM 40 MG: 100 INJECTION SUBCUTANEOUS at 08:37

## 2023-03-07 RX ADMIN — POTASSIUM CHLORIDE 10 MEQ: 7.46 INJECTION, SOLUTION INTRAVENOUS at 16:52

## 2023-03-07 RX ADMIN — POTASSIUM CHLORIDE 10 MEQ: 7.46 INJECTION, SOLUTION INTRAVENOUS at 11:05

## 2023-03-07 RX ADMIN — VANCOMYCIN HYDROCHLORIDE 1000 MG: 1 INJECTION, POWDER, LYOPHILIZED, FOR SOLUTION INTRAVENOUS at 09:37

## 2023-03-07 RX ADMIN — VANCOMYCIN HYDROCHLORIDE 1500 MG: 10 INJECTION, POWDER, LYOPHILIZED, FOR SOLUTION INTRAVENOUS at 00:52

## 2023-03-07 RX ADMIN — POTASSIUM CHLORIDE 20 MEQ: 1500 TABLET, EXTENDED RELEASE ORAL at 08:37

## 2023-03-07 RX ADMIN — BUSPIRONE HYDROCHLORIDE 10 MG: 5 TABLET ORAL at 08:38

## 2023-03-07 RX ADMIN — SODIUM CHLORIDE, PRESERVATIVE FREE 10 ML: 5 INJECTION INTRAVENOUS at 08:39

## 2023-03-07 RX ADMIN — PIPERACILLIN AND TAZOBACTAM 3375 MG: 3; .375 INJECTION, POWDER, FOR SOLUTION INTRAVENOUS at 08:37

## 2023-03-07 RX ADMIN — POTASSIUM CHLORIDE 10 MEQ: 7.46 INJECTION, SOLUTION INTRAVENOUS at 14:12

## 2023-03-07 RX ADMIN — LORAZEPAM 1 MG: 1 TABLET ORAL at 20:09

## 2023-03-07 RX ADMIN — IOPAMIDOL 70 ML: 612 INJECTION, SOLUTION INTRAVENOUS at 16:10

## 2023-03-07 RX ADMIN — MULTIPLE VITAMINS W/ MINERALS TAB 1 TABLET: TAB at 08:38

## 2023-03-07 RX ADMIN — FAMOTIDINE 20 MG: 20 TABLET ORAL at 08:37

## 2023-03-07 RX ADMIN — NADOLOL 20 MG: 20 TABLET ORAL at 12:02

## 2023-03-07 RX ADMIN — ASPIRIN 81 MG CHEWABLE TABLET 81 MG: 81 TABLET CHEWABLE at 08:38

## 2023-03-07 RX ADMIN — CEFTRIAXONE 1000 MG: 1 INJECTION, POWDER, FOR SOLUTION INTRAMUSCULAR; INTRAVENOUS at 16:52

## 2023-03-07 RX ADMIN — PIPERACILLIN AND TAZOBACTAM 3375 MG: 3; .375 INJECTION, POWDER, FOR SOLUTION INTRAVENOUS at 00:53

## 2023-03-07 RX ADMIN — POTASSIUM CHLORIDE 10 MEQ: 7.46 INJECTION, SOLUTION INTRAVENOUS at 09:38

## 2023-03-07 RX ADMIN — SODIUM CHLORIDE: 9 INJECTION, SOLUTION INTRAVENOUS at 16:44

## 2023-03-07 RX ADMIN — VANCOMYCIN HYDROCHLORIDE 1000 MG: 1 INJECTION, POWDER, LYOPHILIZED, FOR SOLUTION INTRAVENOUS at 21:29

## 2023-03-07 ASSESSMENT — PAIN SCALES - GENERAL: PAINLEVEL_OUTOF10: 0

## 2023-03-07 NOTE — CARE COORDINATION
03/07/23 1641   Service Assessment   Patient Orientation Alert and Oriented;Person;Place;Situation   Cognition Alert   History Provided By Patient; Child/Family   Primary Caregiver Self   Support Systems Parent   PCP Verified by CM Yes   Prior Functional Level Independent in ADLs/IADLs   Current Functional Level Assistance with the following:;Mobility;Housework   Can patient return to prior living arrangement Unknown at present   Ability to make needs known: Good   Financial Resources Medicaid   Social/Functional History   Lives With Parent   Type of [address]   [address] [facility]   [facility] Help From [address] Needs assistance   Mode of Transportation Car   Occupation Unemployed   Discharge Planning   Type of [place] Parent   Current Services Prior To Admission None   Potential Assistance Purchasing Medications No   Patient expects to be discharged to: Unknown   One/Two Story Residence Two story       Chart reviewed. Admitted yesterday with sepsis, cystitis. Met with patient and mother Nidia Latin at bedside- pt agreeable to discuss with mother present. Reviewed role of CM. Demographics and insurance confirmed. CM inquired re: no PCP listed-  patient stated \"correct\" however mother Joe Tian, she has one, see it's here on her insurance card \" (which was lying on bedside table)   Card notes Alida Hernandez as PCP, however patient and mother both confirmed she has never been to PCP. When CM inquired why, mom answered \"she just won't go\", patient very vague and shrugged shoulders. CM explained importance of PCP follow up to patient and how no PCP can affect DCP at times. Pt/mother share that she has been getting \"weaker\" with becoming ill and has fallen 3x at home recently requiring EMS to come to home.    Mother states she fell on top of her once when trying to assist and \"I won't be able to take care of her if she stays like this \"   They say patient has been unable to ambulate x 1 wk. Patient motherr reports her  age 79 yrs , daughter Yinka Watson lives in [facility] no room and works so can't hlep\". Discussed potentiial need for personal caregiving services after dc vs HH services. Pt shares that her monthly  [employer] income $680 , does not receive food stamps. She doesn't drive, mother usually transports.       Cm will follow for progress/ recommendations

## 2023-03-07 NOTE — PROGRESS NOTES
Lactic Acid 2.9 critical value call received from [address] [place] [address] in lab. Dr Marisol Valdez notified by phone of results at [id number].

## 2023-03-07 NOTE — CONSULTS
A 60years old female patient with medical history of depression, arthritis, tobacco abuse was admitted to to the hospital for generalized weakness and multiple falls of about 30 years duration. Neurology consulted for evaluation of tremor. Patient's mother was in the room and gave additional history. Patient has been having tremor of her upper extremities for about a year and is progressively getting worse. It involves both upper extremities; slightly worse on the right side. Tremor is mostly with activities. Has difficulty holding utensils. Also has difficulty holding cups. Might spill. Has difficulty writing. Her mother said that she has not been walking too much for a long time. She is mostly in bed. She states, it mostly because of depression. Over the past 3 days, she has generalized weakness. When standing, her legs might give out on her and might fall. She is unsteady. No significant changes in her speech. No stiffness. No shuffling gait. No problem swallowing. Family history includes grandfather with a diagnosis of Parkinson's disease. But her mother said that her grandfather has tremor with activities. CT scan of the brain was done yesterday and it did not show any acute changes. There are microvascular ischemic changes in the white matter. She had leukocytosis. Also has l lactic acidosis. Has hypokalemia. Currently on ceftriaxone and vancomycin. Cultures pending. Possible UTI on the UA.       Social History     Socioeconomic History    Marital status:      Spouse name: Not on file    Number of children: Not on file    Years of education: Not on file    Highest education level: Not on file   Occupational History    Not on file   Tobacco Use    Smoking status: Every Day     Packs/day: 0.50     Types: Cigarettes    Smokeless tobacco: Never   Substance and Sexual Activity    Alcohol use: No    Drug use: No    Sexual activity: Not on file     Comment: Hysterectomy   Other Topics Concern    Not on file   Social History Narrative    Not on file     Social Determinants of Health     Financial Resource Strain: Not on file   Food Insecurity: Not on file   Transportation Needs: Not on file   Physical Activity: Not on file   Stress: Not on file   Social Connections: Not on file   Intimate Partner Violence: Not on file   Housing Stability: Not on file       Family History   Problem Relation Age of Onset    Cancer Other         breast    Hypertension Father     Coronary Art Dis Other         grandmother    Diabetes Mother     Hypertension Mother     Diabetes Father         Current Facility-Administered Medications   Medication Dose Route Frequency Provider Last Rate Last Admin    cefTRIAXone (ROCEPHIN) 1,000 mg in sterile water 10 mL IV syringe  1,000 mg IntraVENous Q24H Claribel Albarran MD   1,000 mg at 03/07/23 1652    0.9 % sodium chloride infusion   IntraVENous Continuous Claribel Albarran MD 75 mL/hr at 03/07/23 1644 New Bag at 03/07/23 1644    vancomycin (VANCOCIN) 1,000 mg in sodium chloride 0.9 % 250 mL (vial-mate) IVPB  1,000 mg IntraVENous Q12H Claribel Albarran MD        propranolol (INDERAL) tablet 20 mg  20 mg Oral BID Claribel Albarran MD        sodium chloride flush 0.9 % injection 5-40 mL  5-40 mL IntraVENous 2 times per day Renate Chery MD   10 mL at 03/07/23 0839    sodium chloride flush 0.9 % injection 5-40 mL  5-40 mL IntraVENous PRN Rosie Bell MD        0.9 % sodium chloride infusion   IntraVENous PRN Renate Chery MD        enoxaparin (LOVENOX) injection 40 mg  40 mg SubCUTAneous Daily Rosie Bell MD   40 mg at 03/07/23 0837    ondansetron (ZOFRAN-ODT) disintegrating tablet 4 mg  4 mg Oral Q8H PRN Rosie Bell MD        Or    ondansetron (ZOFRAN) injection 4 mg  4 mg IntraVENous Q6H PRN Rosie Bell MD        polyethylene glycol (GLYCOLAX) packet 17 g  17 g Oral Daily PRN Renate Chery MD        acetaminophen (TYLENOL) tablet 650 mg  650 mg Oral Q6H PRN Rosie Andrew MD        Or    acetaminophen (TYLENOL) suppository 650 mg  650 mg Rectal Q6H PRN Rosie Andrew MD        famotidine (PEPCID) tablet 20 mg  20 mg Oral BID Jose Castro MD   20 mg at 03/07/23 8788    aspirin chewable tablet 81 mg  81 mg Oral Daily Rosie Andrew MD   81 mg at 03/07/23 5202    therapeutic multivitamin-minerals 1 tablet  1 tablet Oral Daily Rosie Andrew MD   1 tablet at 03/07/23 5101    busPIRone (BUSPAR) tablet 10 mg  10 mg Oral BID Jose Castro MD   10 mg at 03/07/23 8377    sertraline (ZOLOFT) tablet 150 mg  150 mg Oral Daily Rosie Andrew MD   150 mg at 03/07/23 9477       Past Medical History:   Diagnosis Date    Arrhythmia     palpitations  heart murmur    Depression     Headache(784.0)     UTI (urinary tract infection)        Past Surgical History:   Procedure Laterality Date    APPENDECTOMY      GYN  1989    partial hysterectomy     ORTHOPEDIC SURGERY  2007    lumbar fusion     TONSILLECTOMY         Allergies   Allergen Reactions    Codeine        Patient Active Problem List   Diagnosis    Severe recurrent depression with psychosis ([address]    Lumbar radiculopathy    Rheumatoid aortitis    Fibromyalgia    Urgency incontinence    Lumbosacral spondylosis without myelopathy    Spasm of muscle    Generalized anxiety disorder    Chronic pain syndrome    Postlaminectomy syndrome, lumbar region    Lumbar degenerative disc disease    Sepsis ([address]         Review of Systems:   Constitutional no fever or chills  Skin denies rash or itching  HENT no changes in hearing  Eyes denies diplopia vision lose  Respiratory denies shortness of breath  Cardiovascular denies chest pain, dyspnea. Gastrointestinal denies nausea, vomiting; has loose stool  Genitourinary denies incontinence. No dysuria.   Musculoskeletal denies joint pain   Hematology denies  bleeding   Neurological as above in HPI      PHYSICAL EXAMINATION:      VITAL SIGNS:  BP (!) 145/73   Pulse 94   Temp 99.5 °F (37.5 °C) (Oral)   Resp 16   Ht 5' 4\" (1.626 m)   Wt 140 lb (63.5 kg)   SpO2 94%   BMI 24.03 kg/m²     GENERAL: In no apparent distress. EXTREMITIES: Muscle tone is normal.  HEAD:   The patient is normocephalic. NEUROLOGIC EXAMINATION  Mental status: Awake, alert, oriented x3, follows simple and complex commands, no neglect, no extinction. Speech and languge: fluent, coherent,  and comprehension intact  CN: VFF, EOMI, PERRLA, face sensation intact , no facial asymmetry noted, palate elevation symmetric bilat, SS+SCM 5/5 bilat, tongue midline  Motor: no pronator drift, tone normal throughout, strength 5/5 throughout except for some poor effort from pain. Sensory: intact to light touch throughout  Coordination: Postural tremor of the upper extremities more on the right side; FNF accurate w/o dysmetria. Intact rapid alternating movement. Gait: not tested       EXAM: CT HEAD WO CONTRAST       CLINICAL INDICATION/HISTORY: trauma ; patient fell, weakness. Multiple recent   falls. TECHNIQUE: Contiguous axial images were obtained through the brain. From these,   sagittal and coronal reconstructions were generated. CT scans at this facility are performed using dose optimization technique as   appropriate with performed exam, to include automated exposure control,   adjustment of mA and/or kV according to patient's size (including appropriate   matching for site-specific examinations), or use of iterative reconstruction   technique. COMPARISON: MRI January 2013       FINDINGS:               Soft tissues: No significant findings. Skull base/calvarium: Unremarkable. Brain: There is no acute intracranial hemorrhage or territorial infarction. There is a moderately pronounced extent of white matter lucencies. This is   progressed since 2013. Ventricles and cisterns: Unremarkable for age. Orbits: Unremarkable.         Paranasal Sinuses: Clear        Other findings: None           Impression   : 1. No acute intracranial hemorrhage or territorial infarct. Progression of white   matter disease, nonspecific but usually small vessel ischemic sequelae. Impression:  A 60years old female patient with medical history as mentioned above  admitted to the hospital for generalized body weakness and multiple falls. Found to have leukocytosis, lactic acidosis, and hypokalemia. Has possible UTI with hematuria. Cultures pending. Currently on ceftriaxone and vancomycin. Neurology consulted for tremor. Has been having tremor for the past 1 year. Is getting worse. Description of possible essential tremor. No symptoms/signs of PD.  CT scan of the brain did not show any acute changes; possible white matter disease from microvascular ischemia. Has a family history of Parkinson's disease: Her grandfather. Patient has depression. Has been on risperidone. But she states, has been having the tremor even before this medication. Has been falling a lot over the past 3 days. Feeling unsteady. Plan:   -MRI of the brain without contrast  -Propranolol 20 mg p.o. twice daily  -Management of possible sepsis/infection per the primary team  -We will follow patient  -Call for questions. PLEASE NOTE:   This document has been produced using voice recognition software. Unrecognized errors in transcription may be present.

## 2023-03-07 NOTE — PLAN OF CARE
Problem: Skin/Tissue Integrity  Goal: Absence of new skin breakdown  3/7/2023 0740 by Carl Tate RN  Outcome: Progressing  3/7/2023 0739 by Carl Tate RN  Outcome: Progressing     Problem: ABCDS Injury Assessment  Goal: Absence of physical injury  3/7/2023 0740 by Carl Tate RN  Outcome: Progressing  3/7/2023 0739 by Carl Tate RN  Outcome: Progressing  Flowsheets (Taken 3/7/2023 0025)  Absence of Physical Injury: Implement safety measures based on patient assessment     Problem: Safety - Adult  Goal: Free from fall injury  3/7/2023 0740 by Carl Tate RN  Outcome: Progressing  3/7/2023 0739 by Carl Tate RN  Outcome: Progressing

## 2023-03-07 NOTE — ED PROVIDER NOTES
EMERGENCY DEPARTMENT HISTORY AND PHYSICAL EXAM      Date: 3/6/2023  Patient Name: Melissa Pabon    History of Presenting Illness     Chief Complaint   Patient presents with    Fatigue    Fall       History (Context): Melissa Pabon is a 60 y.o. female with significant past medical history of depression, arrhythmia, rheumatoid arthritis, fibromyalgia presents via ambulance the ED today with chief complaint of frequent falls over the past few weeks, with fall today. Pt reports she was walking down [address], when she fell. Patient denies hitting head and LOC. Pt reports landing on right shoulder. Pt also reports intermittent aching pain to abdomen. Denies dysuria, hematuria, vomiting, diarrhea, fever, chills. Previous abdominal surgeries include appendectomy. Patient reports she has been taking her psych meds as prescribed. Denies SI and HI. Mom at bedside who reports patient has been Edilma Darian for a number of months\". Denies history of CVA. Denies taking blood thinners. PCP: None None    Current Facility-Administered Medications   Medication Dose Route Frequency Provider Last Rate Last Admin    lactated ringers bolus  30 mL/kg (Ideal) IntraVENous Once HEIDI Gunn        cefTRIAXone (ROCEPHIN) 1,000 mg in sterile water 10 mL IV syringe  1,000 mg IntraVENous Once HEIDI Gunn         No current outpatient medications on file.        Past History     Past Medical History:   Past Medical History:   Diagnosis Date    Arrhythmia     palpitations  heart murmur    Depression     Headache(784.0)     UTI (urinary tract infection)        Past Surgical History:  Past Surgical History:   Procedure Laterality Date    APPENDECTOMY      GYN  1989    partial hysterectomy     ORTHOPEDIC SURGERY  2007    lumbar fusion     TONSILLECTOMY         Family History:  Family History   Problem Relation Age of Onset    Cancer Other         breast    Hypertension Father     Coronary Art Dis Other         grandmother Diabetes Mother     Hypertension Mother     Diabetes Father        Social History:   Social History     Tobacco Use    Smoking status: Every Day     Packs/day: 0.50     Types: Cigarettes    Smokeless tobacco: Never   Substance Use Topics    Alcohol use: No    Drug use: No       Allergies: Allergies   Allergen Reactions    Codeine        PMH, PSH, family history, social history, allergies reviewed with the patient with significant items noted above. Review of Systems   Review of Systems   Constitutional:  Positive for fatigue. Negative for activity change. HENT:  Negative for congestion and sore throat. Respiratory:  Negative for shortness of breath. Cardiovascular:  Negative for chest pain. Gastrointestinal:  Negative for abdominal pain, diarrhea and vomiting. Genitourinary:  Negative for dysuria and vaginal discharge. Musculoskeletal:  Negative for arthralgias and myalgias. Skin:  Negative for wound. Neurological:  Negative for dizziness, syncope and headaches. Frequent falls   All other systems reviewed and are negative. Physical Exam     Vitals:    03/06/23 1515   BP: (!) 148/88   Pulse: (!) 121   Resp: 20   Temp: 98.5 °F (36.9 °C)   TempSrc: Oral   SpO2: 95%   Weight: 140 lb (63.5 kg)   Height: 5' 4\" (1.626 m)       Physical Exam  Vitals and nursing note reviewed. Constitutional:       Appearance: Normal appearance. Comments: Pt in NAD  Disheveled   HENT:      Head: Normocephalic and atraumatic. Eyes:      General: No scleral icterus. Cardiovascular:      Rate and Rhythm: Regular rhythm. Tachycardia present. Pulses: No decreased pulses. Pulmonary:      Effort: Pulmonary effort is normal.      Breath sounds: Normal breath sounds and air entry. Abdominal:      General: There is no distension. Palpations: Abdomen is soft. Tenderness: There is generalized abdominal tenderness.       Comments: Abdomen soft, nontender  Nondistended  No guarding or rigidity Musculoskeletal:      Right shoulder: Tenderness and bony tenderness present. Cervical back: Full passive range of motion without pain. Right lower leg: No edema. Left lower leg: No edema. Comments: Radial pulses strong and equal b/l  Sensation equal and intact to upper extremities b/l  Strength 5/5 to upper extremities b/l   Skin:     General: Skin is warm and dry. Capillary Refill: Capillary refill takes less than 2 seconds. Neurological:      Mental Status: She is alert and oriented to person, place, and time. Mental status is at baseline.       Comments: A&Ox4  Speech clear  Facial muscles equal and intact  Strength 5/5 in all extremities  Sensation intact in all extremities  (-) pronator drift  No finger to nose dysmetria   Psychiatric:         Attention and Perception: Attention normal.       Diagnostic Study Results     Labs -     Recent Results (from the past 12 hour(s))   CBC with Auto Differential    Collection Time: 03/06/23  3:30 PM   Result Value Ref Range    WBC 14.9 (H) 4.6 - 13.2 K/uL    RBC 4.75 4.20 - 5.30 M/uL    Hemoglobin 14.6 12.0 - 16.0 g/dL    Hematocrit 43.1 35.0 - 45.0 %    MCV 90.7 78.0 - 100.0 FL    MCH 30.7 24.0 - 34.0 PG    MCHC 33.9 31.0 - 37.0 g/dL    RDW 17.9 (H) 11.6 - 14.5 %    Platelets 595 (H) 937 - 420 K/uL    MPV 9.6 9.2 - 11.8 FL    Nucleated RBCs 0.0 0  WBC    nRBC 0.00 0.00 - 0.01 K/uL    Seg Neutrophils 82 (H) 40 - 73 %    Lymphocytes 9 (L) 21 - 52 %    Monocytes 8 3 - 10 %    Eosinophils % 0 0 - 5 %    Basophils 1 0 - 2 %    Immature Granulocytes 1 (H) 0.0 - 0.5 %    Segs Absolute 12.1 (H) 1.8 - 8.0 K/UL    Absolute Lymph # 1.4 0.9 - 3.6 K/UL    Absolute Mono # 1.1 0.05 - 1.2 K/UL    Absolute Eos # 0.0 0.0 - 0.4 K/UL    Basophils Absolute 0.1 0.0 - 0.1 K/UL    Absolute Immature Granulocyte 0.2 (H) 0.00 - 0.04 K/UL    Differential Type AUTOMATED     Comprehensive Metabolic Panel    Collection Time: 03/06/23  3:30 PM   Result Value Ref Range    Sodium 138 136 - 145 mmol/L    Potassium 3.6 3.5 - 5.5 mmol/L    Chloride 103 100 - 111 mmol/L    CO2 26 21 - 32 mmol/L    Anion Gap 9 3.0 - 18 mmol/L    Glucose 102 (H) 74 - 99 mg/dL    BUN 12 7.0 - 18 MG/DL    Creatinine 0.47 (L) 0.6 - 1.3 MG/DL    Bun/Cre Ratio 26 (H) 12 - 20      Est, Glom Filt Rate >60 >60 ml/min/1.73m2    Calcium 9.2 8.5 - 10.1 MG/DL    Total Bilirubin 0.8 0.2 - 1.0 MG/DL    ALT 71 (H) 13 - 56 U/L    AST 91 (H) 10 - 38 U/L    Alk Phosphatase 107 45 - 117 U/L    Total Protein 7.7 6.4 - 8.2 g/dL    Albumin 4.0 3.4 - 5.0 g/dL    Globulin 3.7 2.0 - 4.0 g/dL    Albumin/Globulin Ratio 1.1 0.8 - 1.7     Lactic Acid    Collection Time: 03/06/23  3:30 PM   Result Value Ref Range    Lactic Acid, Plasma 4.6 (HH) 0.4 - 2.0 MMOL/L   COVID-19 & Influenza Combo    Collection Time: 03/06/23  3:30 PM    Specimen: Nasopharyngeal   Result Value Ref Range    SARS-CoV-2, PCR Not detected NOTD      Rapid Influenza A By PCR Not detected NOTD      Rapid Influenza B By PCR Not detected NOTD     Procalcitonin    Collection Time: 03/06/23  3:30 PM   Result Value Ref Range    Procalcitonin <0.05 ng/mL   Troponin    Collection Time: 03/06/23  3:30 PM   Result Value Ref Range    Troponin, High Sensitivity 86 (H) 0 - 54 ng/L   TSH    Collection Time: 03/06/23  3:30 PM   Result Value Ref Range    TSH, 3RD GENERATION 2.79 0.36 - 3.74 uIU/mL   T4, Free    Collection Time: 03/06/23  3:30 PM   Result Value Ref Range    T4 Free 1.0 0.7 - 1.5 NG/DL   Ethanol    Collection Time: 03/06/23  3:30 PM   Result Value Ref Range    Ethanol Lvl <3 0 - 3 MG/DL   Urinalysis    Collection Time: 03/06/23  4:22 PM   Result Value Ref Range    Color, UA YELLOW      Appearance CLOUDY      Specific Gravity, UA 1.023 1.005 - 1.030      pH, Urine 5.5 5.0 - 8.0      Protein,  (A) NEG mg/dL    Glucose, UA Negative NEG mg/dL    Ketones, Urine 80 (A) NEG mg/dL    Bilirubin Urine Negative NEG      Blood, Urine MODERATE (A) NEG Urobilinogen, Urine 1.0 0.2 - 1.0 EU/dL    Nitrite, Urine Negative NEG      Leukocyte Esterase, Urine TRACE (A) NEG     Urine Drug Screen    Collection Time: 03/06/23  4:22 PM   Result Value Ref Range    Benzodiazepines, Urine Negative NEG      Barbiturates, Urine Negative NEG      THC, TH-Cannabinol, Urine Negative NEG      Opiates, Urine Negative NEG      PCP, Urine Negative NEG      Cocaine, Urine Negative NEG      Amphetamine, Urine Negative NEG      Methadone, Urine Negative NEG      Comments: (NOTE)    Urinalysis, Micro    Collection Time: 03/06/23  4:22 PM   Result Value Ref Range    WBC, UA 11 to 20 0 - 4 /hpf    RBC, UA 0 to 3 0 - 5 /hpf    Epithelial Cells UA 4+ 0 - 5 /lpf    BACTERIA, URINE 1+ (A) NEG /hpf    Mucus, UA 1+ (A) NEG /lpf    Hyaline Casts, UA 0 to 3 0 - 2 /lpf    Granular Casts, UA 0 to 3 NEG /lpf      Labs Reviewed   CBC WITH AUTO DIFFERENTIAL - Abnormal; Notable for the following components:       Result Value    WBC 14.9 (*)     RDW 17.9 (*)     Platelets 757 (*)     Seg Neutrophils 82 (*)     Lymphocytes 9 (*)     Immature Granulocytes 1 (*)     Segs Absolute 12.1 (*)     Absolute Immature Granulocyte 0.2 (*)     All other components within normal limits   COMPREHENSIVE METABOLIC PANEL - Abnormal; Notable for the following components:    Glucose 102 (*)     Creatinine 0.47 (*)     Bun/Cre Ratio 26 (*)     ALT 71 (*)     AST 91 (*)     All other components within normal limits   LACTIC ACID - Abnormal; Notable for the following components:    Lactic Acid, Plasma 4.6 (*)     All other components within normal limits   TROPONIN - Abnormal; Notable for the following components:    Troponin, High Sensitivity 86 (*)     All other components within normal limits   URINALYSIS - Abnormal; Notable for the following components:    Protein,  (*)     Ketones, Urine 80 (*)     Blood, Urine MODERATE (*)     Leukocyte Esterase, Urine TRACE (*)     All other components within normal limits URINALYSIS, MICRO - Abnormal; Notable for the following components:    BACTERIA, URINE 1+ (*)     Mucus, UA 1+ (*)     All other components within normal limits   COVID-19 & INFLUENZA COMBO   CULTURE, BLOOD 1   CULTURE, BLOOD 2   CULTURE, URINE   PROCALCITONIN   TSH   T4, FREE   ETHANOL   URINE DRUG SCREEN   LACTATE, SEPSIS   LACTATE, SEPSIS   TROPONIN   POCT GLUCOSE       Radiologic Studies -   CT HEAD WO CONTRAST   Final Result   :      1. No acute intracranial hemorrhage or territorial infarct. Progression of white   matter disease, nonspecific but usually small vessel ischemic sequelae. CT CERVICAL SPINE WO CONTRAST   Final Result   :      1. There is an incidental finding of presumed adenopathy at the left side of the   neck. This requires further investigation. Options include ultrasound or neck CT   with IV contrast, or directly proceeding to PET CT, as this will ultimately   likely be required. 2. No acute traumatic finding of the cervical spine   -Moderate degenerative findings; no high-grade spinal stenosis      3. Discussed by me with HEIDI Leo at 1913 hours            CT ABDOMEN PELVIS WO CONTRAST Additional Contrast? None   Final Result   :      1. No acute findings within the abdomen or pelvis   -A few tiny nonspecific hypodensities within the liver       2. Details and incidentals as above. XR CHEST PORTABLE   Final Result      Partial exclusion of the right costophrenic sulcus from the field-of-view,   slightly limiting evaluation. No radiographic evidence of acute cardiopulmonary process. The laboratory results, imaging results, and other diagnostic exams were reviewed in the EMR. Medical Decision Making   I am the first provider for this patient. I reviewed the vital signs, available nursing notes, past medical history, past surgical history, family history and social history. Vital Signs-Reviewed the patient's vital signs.        Records Reviewed: Personally, on initial evaluation    MDM:   DDX includes but is not limited to: Sepsis, UTI, ICH, Bacteremia, PNA, COVID, Infleunza    Will obtain lab work and imaging for further evaluation of patients complaint. Will continue to monitor and evaluate patient while in the ED. Orders as below:  Orders Placed This Encounter   Procedures    COVID-19 & Influenza Combo    Culture, Blood 1    Culture, Blood 2    Culture, Urine    XR CHEST PORTABLE    CT HEAD WO CONTRAST    CT CERVICAL SPINE WO CONTRAST    CT ABDOMEN PELVIS WO CONTRAST Additional Contrast? None    CBC with Auto Differential    Comprehensive Metabolic Panel    Lactic Acid    Lactate, Sepsis    Procalcitonin    Troponin    Urinalysis    TSH    T4, Free    Ethanol    Urine Drug Screen    Urinalysis, Micro    Troponin    Height and weight    Neurologic Status Assessment    Strict intake and output    Vital Signs Per Unit Routine    POCT Glucose    EKG 12 Lead    Saline lock IV        59 yo F who presents for frequent falls. Patient reports falling today and landing to her right shoulder. On exam TTP. X-rays negative for fracture. Patient tachycardic with elevated lactic and leukocytosis. Septic. Suspect source is likely UTI. UA shows WBC in urine. Urine culture sent. Fluids and antibiotics started in ED. CT scan of abdomen shows no acute findings. CT head shows no ICH. Pt admitted for further management. ED Course:      Is this patient to be included in the SEP-1 core measure due to severe sepsis or septic shock? No Exclusion criteria - the patient is NOT to be included for SEP-1 Core Measure due to: May have criteria for sepsis, but does not meet criteria for severe sepsis or septic shock      Procedures:  Procedures        Documentation/Prior Results Review:  Previous electrocardiograms. Nursing notes. Previous radiology studies. Diagnosis and Disposition     CLINICAL IMPRESSION:  1. Septicemia ([address]    2.  Acute cystitis without hematuria         Medication List      You have not been prescribed any medications. Disposition: Admitted    Patient condition at time of disposition: Stable        Dragon Disclaimer     Please note that this dictation was completed with [employer], the computer voice recognition software. Quite often unanticipated grammatical, syntax, homophones, and other interpretive errors are inadvertently transcribed by the computer software. Please disregard these errors. Please excuse any errors that have escaped final proofreading.       Shy Tanner [place], [place]  03/09/23 0999

## 2023-03-07 NOTE — PROGRESS NOTES
INTERIM UPDATE - 3019 EST on 3/07/2023    Nursing Staff calls to report that AM labs have returned with abnormal findings of K+ 2.6 mmol/L with Serum Magnesium 2.0 mg/dL this AM.    Nursing Staff reports that Patient is able to tolerate PO/IV. Plan:  IV Potassium chloride 10 mEq q1hr x4 doses starting at 0700 EST. PO Potassium chloride 40 mEq TID x2 doses. Recheck Serum Potassium at 1600 EST.

## 2023-03-07 NOTE — H&P
History & Physical    Patient: Anthony Chen MRN: [id number]  [facility]: [id number]    YOB: 1960  Age: 60 y.o. Sex: female      DOA: 3/6/2023  CC: fall, malaise x3 days    PCP: None None       HPI:     Anthony Chen is a 60 y.o. female with medical co-morbidities including Depression, Fibromyalgia, Rheumatoid arthritis, tobacco smoking dependence, presented from home with recurrent falls for the last 3 days. She reported that she started to feel generally tired and her legs gave out on her while she was walking. Today, she reported that she was walking out of her bathroom when her legs gave out on her. She reported feeling generally weak in the last few days. She reported frequent urination but no urinary pain. No fever/chill. No shortness of breath, no cough. No headache. She reported that over the last 2 months, she noticed that she has been more Tremulous with her actions. She reported that she had tremor with feeding herself or attempt to reach objections. Otherwise, she has no other complaint. She reported to continue smoking tobacco at home. She reported that she has been on Risperidone and Sertraline and Buspirone for her psychiatric issues. No suicidal thought at this time. In the ER, she was found with sinus tachycardia, leukocytosis, elevated lactic acid, elevated HS troponin, Her urine was positive for infection. She was started on ceftriaxone. CT head is negative, CT c-spine was negative. She has incidental left-sided adenopathy of her neck. CT abd/pelv without acute pathology. CXR without infiltrates         Review of Systems  GENERAL: No fever, + chill, + malaise   HEENT: No change in vision, no ear ache, no sore throat or sinus congestion. NECK: No pain or stiffness. PULMONARY: No shortness of breath, no cough or wheeze.    Cardiovascular: no pnd / orthopnea, no Chest Pain  GASTROINTESTINAL: No abd pain, No nausea/vomiting, No diarrhea, No melena or bright red blood per rectum. GENITOURINARY: ++ urinary frequency, No urgency or pain with urination. MUSCULOSKELETAL: + joint or muscle pain, no back pain, no recent trauma. DERMATOLOGIC: No rash, no itching, no lesions. ENDOCRINE: No polyuria, polydipsia, No recent change in weight. HEMATOLOGICAL: No easy bruising or bleeding. NEUROLOGIC: No headache, No seizures, ++ generalized weakness         Past Medical History:   Diagnosis Date    Arrhythmia     palpitations  heart murmur    Depression     Headache(784.0)     UTI (urinary tract infection)        Past Surgical History:   Procedure Laterality Date    APPENDECTOMY      GYN  1989    partial hysterectomy     ORTHOPEDIC SURGERY  2007    lumbar fusion     TONSILLECTOMY         Family History   Problem Relation Age of Onset    Cancer Other         breast    Hypertension Father     Coronary Art Dis Other         grandmother    Diabetes Mother     Hypertension Mother     Diabetes Father        Social History     Socioeconomic History    Marital status:    Tobacco Use    Smoking status: Every Day     Packs/day: 0.50     Types: Cigarettes    Smokeless tobacco: Never   Substance and Sexual Activity    Alcohol use: No    Drug use: No       Prior to Admission medications    Medication Sig Start Date End Date Taking?  Authorizing Provider   busPIRone (BUSPAR) 10 MG tablet take 1 tablet by mouth twice a day 2/27/23   Historical Provider, MD   risperiDONE (RISPERDAL) 1 MG tablet take 1 tablet by mouth at bedtime 2/12/23   Historical Provider, MD   sertraline (ZOLOFT) 100 MG tablet take 1 and 1/2 tablets by mouth once daily 2/23/23   Historical Provider, MD   zolpidem (AMBIEN) 10 MG tablet take 1 tablet by mouth at bedtime if needed 2/12/23   Historical Provider, MD       Allergies   Allergen Reactions    Codeine               Physical Exam:      Visit Vitals  BP (!) 148/88   Pulse (!) 121   Temp 98.5 °F (36.9 °C) (Oral)   Resp 20   Ht 5' 4\" (1.626 m)   Wt 140 lb (63.5 kg)   SpO2 95%   BMI 24.03 kg/m²       Physical Exam:  Tele: sinus tachycardia   General:  Cooperative, Not in acute distress, speaks in full sentence while in bed  HEENT: PERRL, EOMI, supple neck, no JVD, dry oral mucosa  Cardiovascular: S1S2 tachycardia, no rub/gallop   Pulmonary: + air entry bilaterally, no wheezing, no crackle  GI:  Soft, non tender, non distended, +bs, no guarding   Extremities:  No pedal edema, +distal pulses appreciated   Neuro: AOx3, moving all extremities, no gross deficit. Lab/Data Review:  Labs: Results:       Chemistry Recent Labs     03/06/23  1530      K 3.6      CO2 26   BUN 12   GLOB 3.7      CBC w/Diff Recent Labs     03/06/23  1530   WBC 14.9*   RBC 4.75   HGB 14.6   HCT 43.1   *      Coagulation    Iron/Ferritin    BNP    Cardiac Enzymes    Liver Enzymes    Thyroid Studies Lab Results   Component Value Date/Time    TSH 1.44 07/22/2019 04:40 PM          Results       Procedure Component Value Units Date/Time    Blood Culture 1 [[id number]] Collected: 03/06/23 1630    Order Status: Canceled Specimen: Blood     Culture, Urine [[id number]] Collected: 03/06/23 1622    Order Status: Sent Specimen: Urine, clean catch Updated: 03/06/23 1637    COVID-19 & Influenza Combo [[id number]] Collected: 03/06/23 1530    Order Status: Completed Specimen: Nasopharyngeal Updated: 03/06/23 1606     SARS-CoV-2, PCR Not detected        Comment: Not Detected results do not preclude SARS-CoV-2 infection and should not be used as the sole basis for patient management decisions. Results must be combined with clinical observations, patient history, and epidemiological information. Rapid Influenza A By PCR Not detected        Rapid Influenza B By PCR Not detected        Comment: Testing was performed using alex Dulce SARS-CoV-2 and Influenza A/B nucleic acid assay.   This test is a multiplex Real-Time Reverse Transcriptase Polymerase Chain Reaction (RT-PCR) based in vitro diagnostic test intended for the qualitative detection of nucleic acids from SARS-CoV-2, Influenza A, and Influenza B in nasopharyngeal for use under the FDA's Emergency Use Authorization(EAU) only. Fact sheet for Patients: FindDrives.pl  Fact sheet for Healthcare Providers: FindDrives.pl         Culture, Blood 1 [[id number]] Collected: 03/06/23 1530    Order Status: Sent Specimen: Blood Updated: 03/06/23 1602    Culture, Blood 2 [[id number]] Collected: 03/06/23 1530    Order Status: Sent Specimen: Blood Updated: 03/06/23 1611              Imaging Reviewed:  CT ABDOMEN PELVIS WO CONTRAST Additional Contrast? None    Result Date: 3/6/2023  EXAM: CT ABDOMEN PELVIS WO CONTRAST CLINICAL INDICATION/HISTORY: sepsis unknown TECHNIQUE: Contiguous axial images were obtained through the abdomen and pelvis. From these, sagittal and coronal reconstructions were generated. CT scans at this facility are performed using dose optimization technique as appropriate with performed exam, to include automated exposure control, adjustment of mA and/or kV according to patient's size (including appropriate matching for site-specific examinations), or use of iterative reconstruction technique. COMPARISON: October 2018 FINDINGS: Lower chest: Unremarkable Liver: A few tiny caliber hypodensities within the liver. Not clearly seen previously on contrast-enhanced examination. These could reflect hemangiomata, but are nonspecific. Gallbladder/biliary: Unremarkable Spleen: Unremarkable Pancreas: Unremarkable Left Kidney: Unremarkable Right kidney: Unremarkable Adrenals: Unremarkable Bowels/mesentery: No obstruction or mesenteric inflammation. Peritoneal Spaces: Unremarkable Urinary bladder: Unremarkable  Pelvic organs: Prior hysterectomy. Retained ovaries. Tubal ligation clips. Vascular: Aorta unremarkable for age. IVC unremarkable. Lymph Nodes: No adenopathy.  Body wall: Unremarkable Bones: No acute findings. Mature appearing circumferential fusion with decompression at L3-S1.     : 1. No acute findings within the abdomen or pelvis -A few tiny nonspecific hypodensities within the liver 2. Details and incidentals as above. CT HEAD WO CONTRAST    Result Date: 3/6/2023  EXAM: CT HEAD WO CONTRAST CLINICAL INDICATION/HISTORY: trauma ; patient fell, weakness. Multiple recent falls. TECHNIQUE: Contiguous axial images were obtained through the brain. From these, sagittal and coronal reconstructions were generated. CT scans at this facility are performed using dose optimization technique as appropriate with performed exam, to include automated exposure control, adjustment of mA and/or kV according to patient's size (including appropriate matching for site-specific examinations), or use of iterative reconstruction technique. COMPARISON: MRI January 2013 FINDINGS: Soft tissues: No significant findings. Skull base/calvarium: Unremarkable. Brain: There is no acute intracranial hemorrhage or territorial infarction. There is a moderately pronounced extent of white matter lucencies. This is progressed since 2013. Ventricles and cisterns: Unremarkable for age. Orbits: Unremarkable. Paranasal Sinuses: Clear Other findings: None     : 1. No acute intracranial hemorrhage or territorial infarct. Progression of white matter disease, nonspecific but usually small vessel ischemic sequelae. CT CERVICAL SPINE WO CONTRAST    Result Date: 3/6/2023  EXAM: CT CERVICAL SPINE WO CONTRAST CLINICAL INDICATION/HISTORY: trauma ; recently with weakness and multiple falls TECHNIQUE: Contiguous 2.5 mm axial images were obtained through the cervical spine. From these, sagittal and coronal reconstructions were generated.  CT scans at this facility are performed using dose optimization technique as appropriate with performed exam, to include automated exposure control, adjustment of mA and/or kV according to patient's size (including appropriate matching for site-specific examinations), or use of iterative reconstruction technique. COMPARISON: MRI December 2014 FINDINGS: Acute findings: No acute fracture or traumatic subluxation. Alignment: Intact Degenerative findings: Mild disc osteophyte complex formation most evident at C3-4. Mild degenerative changes of the atlantoaxial joint. Moderate multilevel facet arthropathy predominating on the left. -Mild spinal stenosis at C3-4. Moderate foraminal stenoses on the left C4-C6. Other structures: At the left side of the neck posterior of the submandibular gland and just deep of the anterior margin of sternocleidomastoid, there is uniform ovoid mass/adenopathy measuring 2.5 x 1.7 cm. Measures 42 Hounsfield units. No such finding on remote imaging.     : 1. There is an incidental finding of presumed adenopathy at the left side of the neck. This requires further investigation. Options include ultrasound or neck CT with IV contrast, or directly proceeding to PET CT, as this will ultimately likely be required. 2. No acute traumatic finding of the cervical spine -Moderate degenerative findings; no high-grade spinal stenosis 3. Discussed by me with HEIDI Mack at 1913 hours     XR CHEST PORTABLE    Result Date: 3/6/2023  EXAM: XR CHEST PORTABLE CLINICAL INDICATION/HISTORY: 60 years Female. Sepsis. Additional History: None TECHNIQUE: Frontal view of the chest COMPARISON: 10/15/2018 FINDINGS: Partial exclusion of the right costophrenic sulcus from the field-of-view, slightly limiting evaluation. The cardiac silhouette appears within normal limits. Pulmonary vasculature appears within normal limits. No confluent airspace opacity is appreciated. No definite evidence of pleural effusion or pneumothorax. No acute osseous abnormality appreciated. Partial exclusion of the right costophrenic sulcus from the field-of-view, slightly limiting evaluation. No radiographic evidence of acute cardiopulmonary process. Assessment:   Principal Problem:    Sepsis ([address]      Sepsis POA (sinus tachycardia, leukocytosis, possibly UTI)  Acute cystitis with hematuria  Fall, physical deconditioning  Leukocytosis  Thrombocytosis  Tobacco smoking dependence  Incidental adenopathy at the left side of the neck  Sinus tachycardia  Hypertension  Elevated troponin in the setting of sepsis, likely MI type II  Tremors, she describes it as with intention, however possible side effects of risperidone as well. Depression      Plan:     Admitted to medical floor to continue IV fluid and IV antibiotics per sepsis protocol, repeat lactic acid checkup, if this continues to be elevated then she will need another liter of fluid. She can be transition to broad-spectrum antibiotic at this time. Blood culture and urine culture to follow-up  Please consider ID consult if needed tomorrow. Spoke with her at length about risks tobacco smoking, she needs tobacco smoking cessation. Will not order nicotine patch as this contain coronary artery spasm, and is not warranted at this time since she have type II MI. We will have her on aspirin  Check echocardiogram tomorrow  Will not start heparin drip unless her high-sensitivity troponin elevated significantly on repeat  As for her tremors, this could be due to risperidone side effects, will hold off on this.   Please consider psychiatric consult to help adjust her psychiatric medications  Pepcid  ICS  PT OT needed        Risk of deterioration:  []Low    [x]Moderate  []High     Prophylaxis:  [x]Lovenox  []Coumadin  []Hep SQ  []SCDs  [x]H2B/PPI     Disposition:  []Home w/ Family   [x] PT,OT,RN   []SNF/LTC   []SAH/Rehab     Discussed Code Status:         [x]Full Code      []DNR         ___________________________________________________     Care Plan discussed with:    [x]Patient   []Family    []ED Care Manager  [x]ED Doc   []Specialist :  Total Time Coordinating Admission:   65   minutes    []Total Critical Care Time:       Hugo Zamudio MD  3/6/2023, 7:47 PM

## 2023-03-07 NOTE — PROGRESS NOTES
[address] [facility] Pharmacokinetic Monitoring Service - Vancomycin     Rg Woodson is a 60 y.o. female starting on vancomycin therapy for Sepsis of Unknown Etiology. Pharmacy consulted for monitoring and adjustment. Target Concentration: Goal AUC/DAISY 400-600 mg*hr/L    Additional Antimicrobials: Piperacillin/Tazobactam    Pertinent Laboratory Values:   Temp: 98.1 °F (36.7 °C), Weight: 140 lb (63.5 kg)  Recent Labs     03/06/23  1530   CREATININE 0.47*   BUN 12   WBC 14.9*   PROCAL <0.05     Estimated Creatinine Clearance: 106 mL/min (A) (based on SCr of 0.47 mg/dL (L)). Pertinent Cultures:  Culture Date Source Results   03/06 Blood Pending   03/06 Urine, clean catch Pending   MRSA Nasal Swab: N/A.  Non-respiratory infection    Plan:  Dosing recommendations based on Bayesian software  Start vancomycin 1500 mg IV x 1 followed by 1 gm IV q12h  Anticipated AUC of 437 and trough concentration of 12.3 at steady state  Renal labs as indicated   Vancomycin concentration ordered for  03/08 @ 0400  Pharmacy will continue to monitor patient and adjust therapy as indicated    Thank you for the consult,  James Gomez, [facility]  3/6/2023

## 2023-03-07 NOTE — PROGRESS NOTES
[facility] Pharmacokinetic Monitoring Service - Vancomycin    Indication: Sepsis of Unknown Etiology  Target Concentration: Goal AUC/DAISY 400-600 mg*hr/L  Day of Therapy: 1 of 7  Additional Antimicrobials: Cefepime    Pertinent Laboratory Values:   Temp: 97.5 °F (36.4 °C), Weight: 140 lb (63.5 kg)  Recent Labs     03/06/23  1530 03/07/23  0351   CREATININE 0.47* 0.35*   BUN 12 10   WBC 14.9* 11.7   PROCAL <0.05  --        Estimated Creatinine Clearance: 142 mL/min (A) (based on SCr of 0.35 mg/dL (L)). Pertinent Cultures:  Culture Date Source Results   3/6 blood ngtd   3/6 urine pending   MRSA Nasal Swab: N/A.  Non-respiratory infection    Assessment:  Date/Time Current Dose Concentration Timing of Concentration (h) AUC   3/7 1500mg - - -   3/7 1gm q12h      Note: Serum concentrations collected for AUC dosing may appear elevated if collected in close proximity to the dose administered, this is not necessarily an indication of toxicity    Plan:  Current dosing regimen is therapeutic  Continue current dose of 1gm q12h  Renal labs as indicated   Vancomycin concentration ordered for AM labs tomorrow  Pharmacy will continue to monitor patient and adjust therapy as indicated    Thank you for the consult,  [address], [facility]  3/7/2023

## 2023-03-07 NOTE — PLAN OF CARE
Problem: Physical Therapy - Adult  Goal: By Discharge: Performs mobility at highest level of function for planned discharge setting. See evaluation for individualized goals. Description: Physical Therapy Goals  Initiated 3/7/2023 and to be accomplished within 7 day(s)  1. Patient will move from supine to sit and sit to supine in bed with supervision/set-up. 2.  Patient will transfer from bed to chair and chair to bed with supervision/set-up using the least restrictive device. 3.  Patient will perform sit to stand with supervision/set-up. 4.  Patient will ambulate with supervision/set-up for 50 feet with the least restrictive device. 5.  Patient will ascend/descend 8 stairs with handrail(s) with minimal assistance/contact guard assist.    PLOF: Independent. Lives with mother. Two story home; bedroom on second floor. Outcome: Progressing   PHYSICAL THERAPY EVALUATION    Patient: Roslyn Olson (86 y.o. female)  Date: 3/7/2023  Primary Diagnosis: Septicemia ([address] [A41.9]  Cervical adenopathy [R59.0]  Elevated troponin [R77.8]  Acute cystitis without hematuria [N30.00]  Sepsis ([address] [A41.9]  Precautions: Fall Risk  ASSESSMENT :  Supine in bed. Mod A for supine to sit. Seated at EOB with poor balance and lean to left. Max cues and positioning for midline posture. Reports need to use bathroom; prepared bedside commode for stand pivot transfer. Patient refuses trial and during PT education on benefits of OOB, patient spontaneous returns to supine in bed. Min A for rolling for bedpan. Min A for scooting up in bed. Self limiting behaviors and requires sequencing for motor planning and encouragement. Seated in bed with HOB elevated. Educated on benefits of HOB elevation to prepare for OOB. /79,  post session. RN John Urban aware. DEFICITS/IMPAIRMENTS:   Body Structures, Functions, Activity Limitations Requiring Skilled Therapeutic Intervention: Decreased functional mobility ; Decreased ADL status; Decreased strength;Decreased balance;Decreased endurance;Decreased cognition;Decreased safe awareness    Patient will benefit from skilled intervention to address the above impairments. Patient's rehabilitation potential: Therapy Prognosis: Fair. Factors which may influence rehabilitation potential include:   []         None noted  []         Mental ability/status  [x]         Medical condition  []         Home/family situation and support systems  []         Safety awareness  []         Pain tolerance/management  []         Other:      PLAN :  Recommendations and Planned Interventions:   [x]           Bed Mobility Training             [x]    Neuromuscular Re-Education  [x]           Transfer Training                   []    Orthotic/Prosthetic Training  [x]           Gait Training                          []    Modalities  [x]           Therapeutic Exercises           []    Edema Management/Control  [x]           Therapeutic Activities            [x]    Family Training/Education  [x]           Patient Education  []           Other (comment):    Frequency/Duration: Patient will be followed by physical therapy to address goals, 1-2 times per day/3-5 days per week to address goals. Further Equipment Recommendations for Discharge: rolling walker    Discharge Recommendations: Subacute/Skilled Nursing Facility    AMPAC: 13/24  This [facility] score should be considered in conjunction with interdisciplinary team recommendations to determine the most appropriate discharge setting. Patient's social support, diagnosis, medical stability, and prior level of function should also be taken into consideration. Current research shows that an AM-PAC score of 17 (13 without stairs) or less is not associated with a discharge to the patient's home setting.  Based on an AM-PAC score of 13/24 and their current functional mobility deficits, it is recommended that the patient have 3-5 sessions per week of Physical Therapy at d/c to increase the patient's independence. SUBJECTIVE:   Patient stated No I can't.     OBJECTIVE DATA SUMMARY:     Past Medical History:   Diagnosis Date    Arrhythmia     palpitations  heart murmur    Depression     Headache(784.0)     UTI (urinary tract infection)      Past Surgical History:   Procedure Laterality Date    APPENDECTOMY      GYN  1989    partial hysterectomy     ORTHOPEDIC SURGERY  2007    lumbar fusion     TONSILLECTOMY         Home Situation:  Social/Functional History  Lives With: Parent  Type of Home: House  Home Layout: Two level  Home Access: Level entry  Critical Behavior:  Orientation  Orientation Level: Oriented to place;Oriented to person    Strength:    Strength: Generally decreased, functional    Range Of Motion:  AROM: Within functional limits    Functional Mobility:  Bed Mobility:  Bed Mobility Training  Bed Mobility Training: Yes  Rolling: Minimum assistance  Supine to Sit: Moderate assistance  Sit to Supine: Minimum assistance  Scooting: Minimum assistance  Balance:   Balance  Sitting: Impaired  Sitting - Static: Fair (occasional)  Sitting - Dynamic: Fair (occasional)    Pain:  Pain level pre-treatment: 0/10   Pain level post-treatment: 0/10     Activity Tolerance:   Activity Tolerance: Patient tolerated treatment well    After treatment:   []         Patient left in no apparent distress sitting up in chair  [x]         Patient left in no apparent distress in bed  [x]         Call bell left within reach  [x]         Nursing notified  []         Caregiver present  []         Bed alarm activated  []         SCDs applied    COMMUNICATION/EDUCATION:   Patient Education  Education Given To: Patient  Education Provided: Role of Therapy; Energy Conservation; Fall Prevention Strategies; Plan of Care;Transfer Training  Education Method: Demonstration;Verbal;Teach Back  Barriers to Learning: None  Education Outcome: Verbalized understanding;Demonstrated understanding;Continued education needed    Thank you for this referral.  Roxana Valencia, PT  Minutes: 19      Eval Complexity: Decision Making: Medium Complexity    [address] [place] [address] [address] [address] AM-PAC® Basic Mobility Inpatient Short Form (6-Clicks) Version 2    How much HELP from another person does the patient currently need    (If the patient hasn't done an activity recently, how much help from another person do you think he/she would need if he/she tried?)   Total (Total A or Dep)   A Lot  (Mod to Max A)   A Little (Sup or Min A)   None (Mod I to I)   Turning from your back to your side while in a flat bed without using bedrails? [] 1 [] 2 [x] 3 [] 4   2. Moving from lying on your back to sitting on the side of a flat bed without using bedrails? [] 1 [x] 2 [] 3 [] 4   3. Moving to and from a bed to a chair (including a wheelchair)? [] 1 [x] 2 [] 3 [] 4   4. Standing up from a chair using your arms (e.g., wheelchair, or bedside chair)? [] 1 [x] 2 [] 3 [] 4   5. Walking in hospital room? [] 1 [x] 2 [] 3 [] 4   6. Climbing 3-5 steps with a railing?+   [] 1 [x] 2 [] 3 [] 4   +If stair climbing cannot be assessed, skip item #6. Sum responses from items 1-5.

## 2023-03-07 NOTE — PLAN OF CARE
Problem: Occupational Therapy - Adult  Goal: By Discharge: Performs self-care activities at highest level of function for planned discharge setting. See evaluation for individualized goals. Description: Occupational Therapy Goals:  Initiated 3/7/2023 to be met within 7-10 days. 1.  Patient will perform upper body dressing with supervision/set-up. 2.  Patient will perform lower body dressing with minimal assistance/contact guard assist, using AE prn.  3.  Patient will perform functional task for 5 minutes while seated on EOB with supervision for balance. 4.  Patient will perform toilet transfers with moderate assistance . 5. Patient will perform all aspects of toileting with moderate assistance . 6. Patient will participate in upper extremity therapeutic exercise/activities with supervision/set-up for 8-10 minutes to increase strength/endurance for ADLs. PLOF: Patient was independent with basic self care tasks and used a [facility] for functional mobility at her baseline. In the last couple of weeks, she has had a decline in function secondary to weakness in BLEs. She stays upstairs in her home and was receiving assistance from her mother. Outcome: Progressing   OCCUPATIONAL THERAPY EVALUATION    Patient: Sully Sosa (34 y.o. female)  Date: 3/7/2023  Primary Diagnosis: Septicemia ([facility] [address] [A41.9]  Cervical adenopathy [R59.0]  Elevated troponin [R77.8]  Acute cystitis without hematuria [N30.00]  Sepsis ([facility] [address] [A41.9]       Precautions: Fall Risk    ASSESSMENT :  Patient alert and agreeable to therapy session. She was able to sit on EOB from supine with mod assist but quickly declined further progression stating she had a full bladder. Encouragement given to attempt use of the [facility] but patient declined. She was placed on the bedpan for toileting and assisted with hygiene.   Patient continued to state that she could not bear weight through her LEs and her mother confirmed that she had to call the paramedics to get her up a few times at home. Discussed need to attempt OOB activity during next session and she verbalized understanding. Patient was left supine in bed with HOB elevated at end of session with all needs met. DEFICITS/IMPAIRMENTS:  Performance deficits / Impairments: Decreased functional mobility ; Decreased balance;Decreased ADL status; Decreased ROM; Decreased endurance;Decreased high-level IADLs;Decreased strength    Patient will benefit from skilled intervention to address the above impairments. Patient's rehabilitation potential/Prognosis: Good. Factors which may influence rehabilitation potential include:   []             None noted  []             Mental ability/status  [x]             Medical condition  []             Home/family situation and support systems  []             Safety awareness  []             Pain tolerance/management  []             Other:      PLAN :  Recommendations and Planned Interventions:   [x]               Self Care Training                  [x]      Therapeutic Activities  [x]               Functional Mobility Training   []      Cognitive Retraining  [x]               Therapeutic Exercises           [x]      Endurance Activities  [x]               Balance Training                    []      Neuromuscular Re-Education  []               Visual/Perceptual Training     [x]      Home Safety Training  [x]               Patient Education                   [x]      Family Training/Education  []               Other (comment):    Frequency/Duration: Patient will be followed by occupational therapy to address goals, 1-2 times per day/3-5 days per week to address goals. Further Equipment Recommendations for Discharge: bedside commode and rolling walker    AMPAC: Current research shows that an AM-PAC score of 17 or less is not associated with a discharge to the patient's home setting.   Based on an AM-PAC score of 15/24 and their current ADL deficits; it is recommended that the patient have 3-5 sessions per week of Occupational Therapy at d/c to increase the patient's independence. This AMPAC score should be considered in conjunction with interdisciplinary team recommendations to determine the most appropriate discharge setting. Patient's social support, diagnosis, medical stability, and prior level of function should also be taken into consideration. SUBJECTIVE:   Patient stated, My bladder is full.     OBJECTIVE DATA SUMMARY:     Past Medical History:   Diagnosis Date    Arrhythmia     palpitations  heart murmur    Depression     Headache(784.0)     UTI (urinary tract infection)      Past Surgical History:   Procedure Laterality Date    APPENDECTOMY      GYN  1989    partial hysterectomy     ORTHOPEDIC SURGERY  2007    lumbar fusion     TONSILLECTOMY         Home Situation:   Social/Functional History  Lives With: Parent  Type of Home: Condo  Home Layout: Two level  Home Access: Level entry  Bathroom Shower/Tub: Tub/Shower unit, Shower chair with back  Bathroom Equipment: Shower chair  Home Equipment: Cannae Help From: Family  Ambulation Assistance: Needs assistance  Mode of Transportation: Car  Occupation: Unemployed  [x]  Right hand dominant   []  Left hand dominant    Cognitive/Behavioral Status:  Orientation  Orientation Level: Oriented to place;Oriented to person       Skin: Intact on UEs  Edema: None noted in UEs    Vision/Perceptual:    Vision  Vision: Within Functional Limits        Coordination: BUE  Coordination: Within functional limits     Balance:  Balance  Sitting: Impaired  Sitting - Static: Fair (occasional)  Sitting - Dynamic: Fair (occasional)    Strength: BUE  Strength: Generally decreased, functional (3+/5 throughout)    Tone & Sensation: BUE  Tone: Normal  Sensation: Intact    Range of Motion: BUE  AROM: Generally decreased, functional    Functional Mobility and Transfers for ADLs:  Bed Mobility:  Bed Mobility Training  Bed Mobility Training: Yes  Rolling: Minimum assistance  Supine to Sit: Moderate assistance  Sit to Supine: Minimum assistance  Scooting: Minimum assistance  Transfers:  Transfer Training  Transfer Training: Yes  Toilet Transfer:  (NT; patient declined)    ADL Assessment:   Feeding: Setup;Supervision  Grooming: Minimal assistance  UE Bathing: Minimal assistance  LE Bathing: Maximum assistance  UE Dressing: Minimal assistance  LE Dressing: Maximum assistance  Toileting: Maximum assistance    ADL Intervention:  Patient rolled to the left for bed pan placement. Verbal and tactile cues given for hand and leg placement. She was able to roll back onto pan and void given extra time. Bath wipes given and patient able to perform hygiene partially to the perineal area. Bed pan removed after rolling to the left again and hygiene was completed by the therapist.    Pain:  Pain level pre-treatment: 0/10   Pain level post-treatment: 0/10   Pain Intervention(s): NA  Response to intervention: NA    Activity Tolerance:   Activity Tolerance: Patient limited by fatigue  Please refer to the flowsheet for vital signs taken during this treatment. After treatment:   [] Patient left in no apparent distress sitting up in chair  [x] Patient left in no apparent distress in bed  [x] Call bell left within reach  [x] Nursing notified  [x] Caregiver (mother) present  [] Bed alarm activated    COMMUNICATION/EDUCATION:   Patient Education  Education Given To: Patient;Caregiver  Education Provided: Role of Therapy;Plan of Care;Transfer Training;Equipment; Energy Conservation; Fall Prevention Strategies; ADL Adaptive Strategies  Education Method: Demonstration;Verbal;Teach Back  Barriers to Learning: None  Education Outcome: Verbalized understanding;Continued education needed    Thank you for this referral.  Inocencio Rabago MS OTR/L   Minutes: 25    Eval Complexity: Decision Making: JULES Noel 39 AM-PAC® Daily Activity Inpatient Short Form (6-Clicks)*    How much HELP from another person does the patient currently need    (If the patient hasn't done an activity recently, how much help from another person do you think he/she would need if he/she tried?)   Total (Total A or Dep)   A Lot  (Mod to Max A)   A Little (Sup or Min A)   None (Mod I to I)   Putting on and taking off regular lower body clothing? [] 1 [x] 2 [] 3 [] 4   2. Bathing (including washing, rinsing,     drying)? [] 1 [x] 2 [] 3 [] 4   3. Toileting, which includes using toilet, bedpan or urinal?   [] 1 [x] 2 [] 3 [] 4   4. Putting on and taking off regular upper body clothing? [] 1 [] 2 [x] 3 [] 4   5. Taking care of personal grooming such as brushing teeth? [] 1 [] 2 [x] 3 [] 4   6. Eating meals? [] 1 [] 2 [x] 3 [] 4     Current research shows that an AM-PAC score of 17 or less is not associated with a discharge to the patient's home setting. Based on an AM-PAC score of 15/24 and their current ADL deficits; it is recommended that the patient have 3-5 sessions per week of Occupational Therapy at d/c to increase the patient's independence.

## 2023-03-07 NOTE — PROGRESS NOTES
C/O full bladder, urge to void but unable to do so spontaneously. Dr Danny Rosenbaum at nurses station, informed of patient's inability to use bedpan. VORB bladder scan now, if greater than 300, straight cath. Bladder scan results as noted.

## 2023-03-07 NOTE — PROGRESS NOTES
[facility]  Progress Note    Patient: Beverly Menendez Age: 60 y.o. : 1960 MR#: [id number] SSN: [id number]  Date/Time: 3/7/2023     Subjective: Patient lying in the bed, feels okay. Complains of having tremors for the last 2 to 3 months. She denies any nausea vomiting. Denies any dysuria or flank pain. Assessment/Plan:   Sepsis POA  Acute cystitis with hematuria  Tremors, she describes it as with intention, however possible side effects of risperidone as well. Left neck adenopathy  Fall at home  Acute deconditioning  Thrombocytosis  Tobacco smoking dependence  Incidental adenopathy at the left side of the neck  Sinus tachycardia  Hypertension    Plan  Cultures so far negative, lactic acid resolved. Will de-escalate antibiotic to ceftriaxone, follow-up urine cultures  Agree with holding Risperdal, will also get neurology evaluation for tremors  We will get CT neck for further evaluation of adenopathy  We will start nadolol for blood pressure control and possible intention tremors  Will monitor blood pressure and adjust medications accordingly  PT/OT eval and treatment    Discussed with patient and also with her daughter Ernesto Wilhelm over the phone and explained about my above plan care. Also discussed about risk and benefits of CT scan with contrast including but not limited to worsening renal failure, allergic reaction to contrast.  Both understood and agreed with the plan. Addendum  4 PM  RN called me that patient's blood culture positive for GPC in groups  Will restart vancomycin  Repeat blood cultures tomorrow      I spent 40 minutes with the patient in face-to-face consultation, of which greater than 50% was spent in counseling and coordination of care as described above.     Case discussed with:  [x]Patient  [x]Family  [x]Nursing  []Case Management  DVT Prophylaxis:  [x]Lovenox  []Hep SQ  []SCDs  []Coumadin   []Eliquis/Xarelto     Objective:   VS: BP (!) 151/70   Pulse (!) 106   Temp 97.5 °F (36.4 °C) (Oral)   Resp 16   Ht 5' 4\" (1.626 m)   Wt 140 lb (63.5 kg)   SpO2 95%   BMI 24.03 kg/m²    Tmax/24hrs: Temp (24hrs), Av.1 °F (36.7 °C), Min:97.5 °F (36.4 °C), Max:98.5 °F (36.9 °C)  IOBRIEF  Intake/Output Summary (Last 24 hours) at 3/7/2023 1059  Last data filed at 3/7/2023 0940  Gross per 24 hour   Intake --   Output 950 ml   Net -950 ml       General:  Alert, cooperative, no acute distress    HEENT: PERRLA, anicteric sclerae. Pulmonary:  CTA Bilaterally. No Wheezing/Rales. Cardiovascular: Regular rate and Rhythm. GI:  Soft, Non distended, Non tender. + Bowel sounds. Extremities:  No edema. No calf tenderness. Psych: Good insight. Not anxious or agitated. Neurologic: Alert and oriented X 3. Moves all ext.   Intention tremors+  Additional:    Medications:   Current Facility-Administered Medications   Medication Dose Route Frequency    potassium chloride 10 mEq/100 mL IVPB (Peripheral Line)  10 mEq IntraVENous Q1H    potassium chloride (KLOR-CON M) extended release tablet 20 mEq  20 mEq Oral TID     cefTRIAXone (ROCEPHIN) 1,000 mg in sterile water 10 mL IV syringe  1,000 mg IntraVENous Q24H    nadolol (CORGARD) tablet 20 mg  20 mg Oral BID    sodium chloride flush 0.9 % injection 5-40 mL  5-40 mL IntraVENous 2 times per day    sodium chloride flush 0.9 % injection 5-40 mL  5-40 mL IntraVENous PRN    0.9 % sodium chloride infusion   IntraVENous PRN    enoxaparin (LOVENOX) injection 40 mg  40 mg SubCUTAneous Daily    ondansetron (ZOFRAN-ODT) disintegrating tablet 4 mg  4 mg Oral Q8H PRN    Or    ondansetron (ZOFRAN) injection 4 mg  4 mg IntraVENous Q6H PRN    polyethylene glycol (GLYCOLAX) packet 17 g  17 g Oral Daily PRN    acetaminophen (TYLENOL) tablet 650 mg  650 mg Oral Q6H PRN    Or    acetaminophen (TYLENOL) suppository 650 mg  650 mg Rectal Q6H PRN    famotidine (PEPCID) tablet 20 mg  20 mg Oral BID    aspirin chewable tablet 81 mg  81 mg Oral Daily    therapeutic multivitamin-minerals 1 tablet  1 tablet Oral Daily    busPIRone (BUSPAR) tablet 10 mg  10 mg Oral BID    sertraline (ZOLOFT) tablet 150 mg  150 mg Oral Daily       Labs:    Recent Results (from the past 24 hour(s))   CBC with Auto Differential    Collection Time: 03/06/23  3:30 PM   Result Value Ref Range    WBC 14.9 (H) 4.6 - 13.2 K/uL    RBC 4.75 4.20 - 5.30 M/uL    Hemoglobin 14.6 12.0 - 16.0 g/dL    Hematocrit 43.1 35.0 - 45.0 %    MCV 90.7 78.0 - 100.0 FL    MCH 30.7 24.0 - 34.0 PG    MCHC 33.9 31.0 - 37.0 g/dL    RDW 17.9 (H) 11.6 - 14.5 %    Platelets 583 (H) 135 - 420 K/uL    MPV 9.6 9.2 - 11.8 FL    Nucleated RBCs 0.0 0  WBC    nRBC 0.00 0.00 - 0.01 K/uL    Seg Neutrophils 82 (H) 40 - 73 %    Lymphocytes 9 (L) 21 - 52 %    Monocytes 8 3 - 10 %    Eosinophils % 0 0 - 5 %    Basophils 1 0 - 2 %    Immature Granulocytes 1 (H) 0.0 - 0.5 %    Segs Absolute 12.1 (H) 1.8 - 8.0 K/UL    Absolute Lymph # 1.4 0.9 - 3.6 K/UL    Absolute Mono # 1.1 0.05 - 1.2 K/UL    Absolute Eos # 0.0 0.0 - 0.4 K/UL    Basophils Absolute 0.1 0.0 - 0.1 K/UL    Absolute Immature Granulocyte 0.2 (H) 0.00 - 0.04 K/UL    Differential Type AUTOMATED     Comprehensive Metabolic Panel    Collection Time: 03/06/23  3:30 PM   Result Value Ref Range    Sodium 138 136 - 145 mmol/L    Potassium 3.6 3.5 - 5.5 mmol/L    Chloride 103 100 - 111 mmol/L    CO2 26 21 - 32 mmol/L    Anion Gap 9 3.0 - 18 mmol/L    Glucose 102 (H) 74 - 99 mg/dL    BUN 12 7.0 - 18 MG/DL    Creatinine 0.47 (L) 0.6 - 1.3 MG/DL    Bun/Cre Ratio 26 (H) 12 - 20      Est, Glom Filt Rate >60 >60 ml/min/1.73m2    Calcium 9.2 8.5 - 10.1 MG/DL    Total Bilirubin 0.8 0.2 - 1.0 MG/DL    ALT 71 (H) 13 - 56 U/L    AST 91 (H) 10 - 38 U/L    Alk Phosphatase 107 45 - 117 U/L    Total Protein 7.7 6.4 - 8.2 g/dL    Albumin 4.0 3.4 - 5.0 g/dL    Globulin 3.7 2.0 - 4.0 g/dL    Albumin/Globulin Ratio 1.1 0.8 - 1.7     Lactic Acid    Collection Time: 03/06/23  3:30 PM  Result Value Ref Range    Lactic Acid, Plasma 4.6 (HH) 0.4 - 2.0 MMOL/L   COVID-19 & Influenza Combo    Collection Time: 03/06/23  3:30 PM    Specimen: Nasopharyngeal   Result Value Ref Range    SARS-CoV-2, PCR Not detected NOTD      Rapid Influenza A By PCR Not detected NOTD      Rapid Influenza B By PCR Not detected NOTD     Culture, Blood 1    Collection Time: 03/06/23  3:30 PM    Specimen: Blood   Result Value Ref Range    Special Requests LAC     Culture NO GROWTH AFTER 14 HOURS     Culture, Blood 2    Collection Time: 03/06/23  3:30 PM    Specimen: Blood   Result Value Ref Range    Special Requests L ARM     Culture NO GROWTH AFTER 14 HOURS     Procalcitonin    Collection Time: 03/06/23  3:30 PM   Result Value Ref Range    Procalcitonin <0.05 ng/mL   Troponin    Collection Time: 03/06/23  3:30 PM   Result Value Ref Range    Troponin, High Sensitivity 86 (H) 0 - 54 ng/L   TSH    Collection Time: 03/06/23  3:30 PM   Result Value Ref Range    TSH, 3RD GENERATION 2.79 0.36 - 3.74 uIU/mL   T4, Free    Collection Time: 03/06/23  3:30 PM   Result Value Ref Range    T4 Free 1.0 0.7 - 1.5 NG/DL   Ethanol    Collection Time: 03/06/23  3:30 PM   Result Value Ref Range    Ethanol Lvl <3 0 - 3 MG/DL   EKG 12 Lead    Collection Time: 03/06/23  3:55 PM   Result Value Ref Range    Ventricular Rate 115 BPM    Atrial Rate 115 BPM    P-R Interval 130 ms    QRS Duration 82 ms    Q-T Interval 350 ms    QTc Calculation (Bazett) 484 ms    P Axis 52 degrees    R Axis 10 degrees    T Axis 33 degrees    Diagnosis Sinus tachycardia    Urinalysis    Collection Time: 03/06/23  4:22 PM   Result Value Ref Range    Color, UA YELLOW      Appearance CLOUDY      Specific Gravity, UA 1.023 1.005 - 1.030      pH, Urine 5.5 5.0 - 8.0      Protein,  (A) NEG mg/dL    Glucose, UA Negative NEG mg/dL    Ketones, Urine 80 (A) NEG mg/dL    Bilirubin Urine Negative NEG      Blood, Urine MODERATE (A) NEG      Urobilinogen, Urine 1.0 0.2 - 1.0 EU/dL Nitrite, Urine Negative NEG      Leukocyte Esterase, Urine TRACE (A) NEG     Urine Drug Screen    Collection Time: 03/06/23  4:22 PM   Result Value Ref Range    Benzodiazepines, Urine Negative NEG      Barbiturates, Urine Negative NEG      THC, TH-Cannabinol, Urine Negative NEG      Opiates, Urine Negative NEG      PCP, Urine Negative NEG      Cocaine, Urine Negative NEG      Amphetamine, Urine Negative NEG      Methadone, Urine Negative NEG      Comments: (NOTE)    Urinalysis, Micro    Collection Time: 03/06/23  4:22 PM   Result Value Ref Range    WBC, UA 11 to 20 0 - 4 /hpf    RBC, UA 0 to 3 0 - 5 /hpf    Epithelial Cells UA 4+ 0 - 5 /lpf    BACTERIA, URINE 1+ (A) NEG /hpf    Mucus, UA 1+ (A) NEG /lpf    Hyaline Casts, UA 0 to 3 0 - 2 /lpf    Granular Casts, UA 0 to 3 NEG /lpf   Lactate, Sepsis    Collection Time: 03/06/23  7:15 PM   Result Value Ref Range    Lactic Acid, Sepsis 2.5 (HH) 0.4 - 2.0 MMOL/L   Troponin    Collection Time: 03/06/23  7:15 PM   Result Value Ref Range    Troponin, High Sensitivity 89 (H) 0 - 54 ng/L   Lactate, Sepsis    Collection Time: 03/06/23  8:12 PM   Result Value Ref Range    Lactic Acid, Sepsis 2.9 (HH) 0.4 - 2.0 MMOL/L   Hemoglobin A1C    Collection Time: 03/06/23  8:12 PM   Result Value Ref Range    Hemoglobin A1C 5.4 4.2 - 5.6 %    eAG 108 mg/dL   POCT Glucose    Collection Time: 03/06/23 10:00 PM   Result Value Ref Range    POC Glucose 95 70 - 110 mg/dL   Basic Metabolic Panel w/ Reflex to MG    Collection Time: 03/07/23  3:51 AM   Result Value Ref Range    Sodium 141 136 - 145 mmol/L    Potassium 2.6 (LL) 3.5 - 5.5 mmol/L    Chloride 107 100 - 111 mmol/L    CO2 25 21 - 32 mmol/L    Anion Gap 9 3.0 - 18 mmol/L    Glucose 83 74 - 99 mg/dL    BUN 10 7.0 - 18 MG/DL    Creatinine 0.35 (L) 0.6 - 1.3 MG/DL    Bun/Cre Ratio 29 (H) 12 - 20      Est, Glom Filt Rate >60 >60 ml/min/1.73m2    Calcium 8.3 (L) 8.5 - 10.1 MG/DL   CBC with Auto Differential    Collection Time: 03/07/23 3:51 AM   Result Value Ref Range    WBC 11.7 4.6 - 13.2 K/uL    RBC 4.01 (L) 4.20 - 5.30 M/uL    Hemoglobin 12.2 12.0 - 16.0 g/dL    Hematocrit 36.0 35.0 - 45.0 %    MCV 89.8 78.0 - 100.0 FL    MCH 30.4 24.0 - 34.0 PG    MCHC 33.9 31.0 - 37.0 g/dL    RDW 17.9 (H) 11.6 - 14.5 %    Platelets 547 (H) 272 - 420 K/uL    MPV 9.8 9.2 - 11.8 FL    Nucleated RBCs 0.0 0  WBC    nRBC 0.00 0.00 - 0.01 K/uL    Seg Neutrophils 74 (H) 40 - 73 %    Lymphocytes 16 (L) 21 - 52 %    Monocytes 9 3 - 10 %    Eosinophils % 1 0 - 5 %    Basophils 1 0 - 2 %    Immature Granulocytes 1 (H) 0.0 - 0.5 %    Segs Absolute 8.6 (H) 1.8 - 8.0 K/UL    Absolute Lymph # 1.8 0.9 - 3.6 K/UL    Absolute Mono # 1.0 0.05 - 1.2 K/UL    Absolute Eos # 0.1 0.0 - 0.4 K/UL    Basophils Absolute 0.1 0.0 - 0.1 K/UL    Absolute Immature Granulocyte 0.1 (H) 0.00 - 0.04 K/UL    Differential Type AUTOMATED     Lipid Panel    Collection Time: 03/07/23  3:51 AM   Result Value Ref Range    LIPID PANEL          Cholesterol, Total 158 <200 MG/DL    Triglycerides 222 (H) <150 MG/DL    HDL 36 (L) 40 - 60 MG/DL    LDL Calculated 77.6 0 - 100 MG/DL    VLDL Cholesterol Calculated 44.4 MG/DL    Chol/HDL Ratio 4.4 0 - 5.0     Brain Natriuretic Peptide    Collection Time: 03/07/23  3:51 AM   Result Value Ref Range    NT Pro- 0 - 900 PG/ML   Troponin    Collection Time: 03/07/23  3:51 AM   Result Value Ref Range    Troponin, High Sensitivity 92 (H) 0 - 54 ng/L   Lactic Acid    Collection Time: 03/07/23  3:51 AM   Result Value Ref Range    Lactic Acid, Plasma 1.4 0.4 - 2.0 MMOL/L   Magnesium    Collection Time: 03/07/23  3:51 AM   Result Value Ref Range    Magnesium 2.0 1.6 - 2.6 mg/dL       Signed By: Victor Manuel Guadarrama MD     March 7, 2023      Disclaimer: Sections of this note are dictated using utilizing voice recognition software. Minor typographical errors may be present. If questions arise, please do not hesitate to contact me or call our department.

## 2023-03-07 NOTE — ED NOTES
Assumed care of pt at this time. Assistance offered, no needs at this time. Medicated per [facility], labs drawn as ordered.      Elizabeth Rogel RN  03/06/23 2012

## 2023-03-08 ENCOUNTER — APPOINTMENT (OUTPATIENT)
Facility: HOSPITAL | Age: 63
DRG: 720 | End: 2023-03-08
Payer: COMMERCIAL

## 2023-03-08 LAB
ACCESSION NUMBER, LLC1M: ABNORMAL
ACINETOBACTER CALCOAC BAUMANNII COMPLEX BY PCR: NOT DETECTED
ANION GAP SERPL CALC-SCNC: 7 MMOL/L (ref 3–18)
APPEARANCE UR: CLEAR
B FRAGILIS DNA BLD POS QL NAA+NON-PROBE: NOT DETECTED
BACTERIA SPEC CULT: ABNORMAL
BACTERIA SPEC CULT: NORMAL
BASOPHILS # BLD: 0.1 K/UL (ref 0–0.1)
BASOPHILS NFR BLD: 1 % (ref 0–2)
BILIRUB UR QL: NEGATIVE
BIOFIRE TEST COMMENT: ABNORMAL
BUN SERPL-MCNC: 9 MG/DL (ref 7–18)
BUN/CREAT SERPL: 28 (ref 12–20)
C ALBICANS DNA BLD POS QL NAA+NON-PROBE: NOT DETECTED
C AURIS DNA BLD POS QL NAA+NON-PROBE: NOT DETECTED
C DIFF TOXIN INTERPRETATION: ABNORMAL
C GATTII+NEOFOR DNA BLD POS QL NAA+N-PRB: NOT DETECTED
C GLABRATA DNA BLD POS QL NAA+NON-PROBE: NOT DETECTED
C KRUSEI DNA BLD POS QL NAA+NON-PROBE: NOT DETECTED
C PARAP DNA BLD POS QL NAA+NON-PROBE: NOT DETECTED
C TROPICLS DNA BLD POS QL NAA+NON-PROBE: NOT DETECTED
C. DIFFICILE TOXIN MOLECULAR: POSITIVE
CALCIUM SERPL-MCNC: 8 MG/DL (ref 8.5–10.1)
CC UR VC: NORMAL
CHLORIDE SERPL-SCNC: 110 MMOL/L (ref 100–111)
CO2 SERPL-SCNC: 23 MMOL/L (ref 21–32)
COLOR UR: YELLOW
CREAT SERPL-MCNC: 0.32 MG/DL (ref 0.6–1.3)
D DIMER PPP FEU-MCNC: 0.43 UG/ML(FEU)
DIFFERENTIAL METHOD BLD: ABNORMAL
E CLOAC COMP DNA BLD POS NAA+NON-PROBE: NOT DETECTED
E COLI DNA BLD POS QL NAA+NON-PROBE: NOT DETECTED
E FAECALIS DNA BLD POS QL NAA+NON-PROBE: NOT DETECTED
E FAECIUM DNA BLD POS QL NAA+NON-PROBE: NOT DETECTED
ENTEROBACTERALES DNA BLD POS NAA+N-PRB: NOT DETECTED
EOSINOPHIL # BLD: 0.2 K/UL (ref 0–0.4)
EOSINOPHIL NFR BLD: 1 % (ref 0–5)
ERYTHROCYTE [DISTWIDTH] IN BLOOD BY AUTOMATED COUNT: 18 % (ref 11.6–14.5)
GLUCOSE SERPL-MCNC: 102 MG/DL (ref 74–99)
GLUCOSE UR STRIP.AUTO-MCNC: NEGATIVE MG/DL
GP B STREP DNA BLD POS QL NAA+NON-PROBE: NOT DETECTED
GRAM STN SPEC: ABNORMAL
GRAM STN SPEC: ABNORMAL
HAEM INFLU DNA BLD POS QL NAA+NON-PROBE: NOT DETECTED
HCT VFR BLD AUTO: 36.4 % (ref 35–45)
HGB BLD-MCNC: 12.1 G/DL (ref 12–16)
HGB UR QL STRIP: NEGATIVE
IMM GRANULOCYTES # BLD AUTO: 0.1 K/UL (ref 0–0.04)
IMM GRANULOCYTES NFR BLD AUTO: 1 % (ref 0–0.5)
K OXYTOCA DNA BLD POS QL NAA+NON-PROBE: NOT DETECTED
KETONES UR QL STRIP.AUTO: 15 MG/DL
KLEBSIELLA SP DNA BLD POS QL NAA+NON-PRB: NOT DETECTED
KLEBSIELLA SP DNA BLD POS QL NAA+NON-PRB: NOT DETECTED
L MONOCYTOG DNA BLD POS QL NAA+NON-PROBE: NOT DETECTED
LACTATE SERPL-SCNC: 0.9 MMOL/L (ref 0.4–2)
LEUKOCYTE ESTERASE UR QL STRIP.AUTO: NEGATIVE
LYMPHOCYTES # BLD: 2.2 K/UL (ref 0.9–3.6)
LYMPHOCYTES NFR BLD: 14 % (ref 21–52)
MAGNESIUM SERPL-MCNC: 2 MG/DL (ref 1.6–2.6)
MCH RBC QN AUTO: 30.9 PG (ref 24–34)
MCHC RBC AUTO-ENTMCNC: 33.2 G/DL (ref 31–37)
MCV RBC AUTO: 92.9 FL (ref 78–100)
MECA+MECC ISLT/SPM QL: NOT DETECTED
MONOCYTES # BLD: 1.2 K/UL (ref 0.05–1.2)
MONOCYTES NFR BLD: 8 % (ref 3–10)
N MEN DNA BLD POS QL NAA+NON-PROBE: NOT DETECTED
NEUTS SEG # BLD: 11.5 K/UL (ref 1.8–8)
NEUTS SEG NFR BLD: 76 % (ref 40–73)
NITRITE UR QL STRIP.AUTO: NEGATIVE
NRBC # BLD: 0 K/UL (ref 0–0.01)
NRBC BLD-RTO: 0 PER 100 WBC
NT PRO BNP: 1795 PG/ML (ref 0–900)
P AERUGINOSA DNA BLD POS NAA+NON-PROBE: NOT DETECTED
PH UR STRIP: 6 (ref 5–8)
PLATELET # BLD AUTO: 490 K/UL (ref 135–420)
PMV BLD AUTO: 9.6 FL (ref 9.2–11.8)
POTASSIUM SERPL-SCNC: 3.3 MMOL/L (ref 3.5–5.5)
PROT UR STRIP-MCNC: NEGATIVE MG/DL
PROTEUS SP DNA BLD POS QL NAA+NON-PROBE: NOT DETECTED
RBC # BLD AUTO: 3.92 M/UL (ref 4.2–5.3)
REFLEX: ABNORMAL
RESISTANT GENE TARGETS: ABNORMAL
S AUREUS DNA BLD POS QL NAA+NON-PROBE: NOT DETECTED
S AUREUS+CONS DNA BLD POS NAA+NON-PROBE: DETECTED
S EPIDERMIDIS DNA BLD POS QL NAA+NON-PRB: DETECTED
S LUGDUNENSIS DNA BLD POS QL NAA+NON-PRB: NOT DETECTED
S MALTOPHILIA DNA BLD POS QL NAA+NON-PRB: NOT DETECTED
S MARCESCENS DNA BLD POS NAA+NON-PROBE: NOT DETECTED
S PNEUM DNA BLD POS QL NAA+NON-PROBE: NOT DETECTED
S PYO DNA BLD POS QL NAA+NON-PROBE: NOT DETECTED
SALMONELLA DNA BLD POS QL NAA+NON-PROBE: NOT DETECTED
SERVICE CMNT-IMP: ABNORMAL
SERVICE CMNT-IMP: NORMAL
SODIUM SERPL-SCNC: 140 MMOL/L (ref 136–145)
SP GR UR REFRACTOMETRY: 1.03 (ref 1–1.03)
STREPTOCOCCUS DNA BLD POS NAA+NON-PROBE: NOT DETECTED
TROPONIN I SERPL HS-MCNC: 54 NG/L (ref 0–54)
UROBILINOGEN UR QL STRIP.AUTO: 0.2 EU/DL (ref 0.2–1)
VANCOMYCIN SERPL-MCNC: 11.8 UG/ML (ref 5–40)
WBC # BLD AUTO: 15.2 K/UL (ref 4.6–13.2)

## 2023-03-08 PROCEDURE — 80202 ASSAY OF VANCOMYCIN: CPT

## 2023-03-08 PROCEDURE — 97535 SELF CARE MNGMENT TRAINING: CPT

## 2023-03-08 PROCEDURE — 99232 SBSQ HOSP IP/OBS MODERATE 35: CPT | Performed by: HOSPITALIST

## 2023-03-08 PROCEDURE — 1100000000 HC RM PRIVATE

## 2023-03-08 PROCEDURE — 87040 BLOOD CULTURE FOR BACTERIA: CPT

## 2023-03-08 PROCEDURE — 2580000003 HC RX 258: Performed by: INTERNAL MEDICINE

## 2023-03-08 PROCEDURE — 83735 ASSAY OF MAGNESIUM: CPT

## 2023-03-08 PROCEDURE — 85379 FIBRIN DEGRADATION QUANT: CPT

## 2023-03-08 PROCEDURE — 80048 BASIC METABOLIC PNL TOTAL CA: CPT

## 2023-03-08 PROCEDURE — 6360000002 HC RX W HCPCS: Performed by: HOSPITALIST

## 2023-03-08 PROCEDURE — 85025 COMPLETE CBC W/AUTO DIFF WBC: CPT

## 2023-03-08 PROCEDURE — 87449 NOS EACH ORGANISM AG IA: CPT

## 2023-03-08 PROCEDURE — 83880 ASSAY OF NATRIURETIC PEPTIDE: CPT

## 2023-03-08 PROCEDURE — 97530 THERAPEUTIC ACTIVITIES: CPT

## 2023-03-08 PROCEDURE — 87506 IADNA-DNA/RNA PROBE TQ 6-11: CPT

## 2023-03-08 PROCEDURE — 71260 CT THORAX DX C+: CPT

## 2023-03-08 PROCEDURE — 83605 ASSAY OF LACTIC ACID: CPT

## 2023-03-08 PROCEDURE — 81003 URINALYSIS AUTO W/O SCOPE: CPT

## 2023-03-08 PROCEDURE — 2700000000 HC OXYGEN THERAPY PER DAY

## 2023-03-08 PROCEDURE — 71045 X-RAY EXAM CHEST 1 VIEW: CPT

## 2023-03-08 PROCEDURE — 87493 C DIFF AMPLIFIED PROBE: CPT

## 2023-03-08 PROCEDURE — 87086 URINE CULTURE/COLONY COUNT: CPT

## 2023-03-08 PROCEDURE — 36415 COLL VENOUS BLD VENIPUNCTURE: CPT

## 2023-03-08 PROCEDURE — 6360000004 HC RX CONTRAST MEDICATION: Performed by: FAMILY MEDICINE

## 2023-03-08 PROCEDURE — 2580000003 HC RX 258: Performed by: HOSPITALIST

## 2023-03-08 PROCEDURE — 6360000002 HC RX W HCPCS: Performed by: INTERNAL MEDICINE

## 2023-03-08 PROCEDURE — 6370000000 HC RX 637 (ALT 250 FOR IP): Performed by: HOSPITALIST

## 2023-03-08 PROCEDURE — 6370000000 HC RX 637 (ALT 250 FOR IP): Performed by: INTERNAL MEDICINE

## 2023-03-08 PROCEDURE — 99232 SBSQ HOSP IP/OBS MODERATE 35: CPT | Performed by: STUDENT IN AN ORGANIZED HEALTH CARE EDUCATION/TRAINING PROGRAM

## 2023-03-08 PROCEDURE — 84484 ASSAY OF TROPONIN QUANT: CPT

## 2023-03-08 RX ORDER — AMLODIPINE BESYLATE 5 MG/1
5 TABLET ORAL DAILY
Status: DISCONTINUED | OUTPATIENT
Start: 2023-03-08 | End: 2023-03-11 | Stop reason: HOSPADM

## 2023-03-08 RX ORDER — FUROSEMIDE 10 MG/ML
20 INJECTION INTRAMUSCULAR; INTRAVENOUS ONCE
Status: COMPLETED | OUTPATIENT
Start: 2023-03-08 | End: 2023-03-08

## 2023-03-08 RX ADMIN — SODIUM CHLORIDE, PRESERVATIVE FREE 10 ML: 5 INJECTION INTRAVENOUS at 21:40

## 2023-03-08 RX ADMIN — BUSPIRONE HYDROCHLORIDE 10 MG: 5 TABLET ORAL at 09:53

## 2023-03-08 RX ADMIN — IOPAMIDOL 80 ML: 612 INJECTION, SOLUTION INTRAVENOUS at 12:37

## 2023-03-08 RX ADMIN — ENOXAPARIN SODIUM 40 MG: 100 INJECTION SUBCUTANEOUS at 09:52

## 2023-03-08 RX ADMIN — SODIUM CHLORIDE, PRESERVATIVE FREE 10 ML: 5 INJECTION INTRAVENOUS at 09:53

## 2023-03-08 RX ADMIN — BUSPIRONE HYDROCHLORIDE 10 MG: 5 TABLET ORAL at 21:39

## 2023-03-08 RX ADMIN — AMLODIPINE BESYLATE 5 MG: 5 TABLET ORAL at 15:10

## 2023-03-08 RX ADMIN — FAMOTIDINE 20 MG: 20 TABLET ORAL at 21:39

## 2023-03-08 RX ADMIN — ASPIRIN 81 MG CHEWABLE TABLET 81 MG: 81 TABLET CHEWABLE at 09:53

## 2023-03-08 RX ADMIN — Medication 1250 MG: at 21:35

## 2023-03-08 RX ADMIN — PROPRANOLOL HYDROCHLORIDE 20 MG: 10 TABLET ORAL at 21:39

## 2023-03-08 RX ADMIN — VANCOMYCIN HYDROCHLORIDE 125 MG: 1 INJECTION, POWDER, LYOPHILIZED, FOR SOLUTION INTRAVENOUS at 23:43

## 2023-03-08 RX ADMIN — PROPRANOLOL HYDROCHLORIDE 20 MG: 10 TABLET ORAL at 10:00

## 2023-03-08 RX ADMIN — VANCOMYCIN HYDROCHLORIDE 125 MG: 1 INJECTION, POWDER, LYOPHILIZED, FOR SOLUTION INTRAVENOUS at 15:10

## 2023-03-08 RX ADMIN — SERTRALINE 150 MG: 50 TABLET, FILM COATED ORAL at 09:53

## 2023-03-08 RX ADMIN — CEFTRIAXONE 1000 MG: 1 INJECTION, POWDER, FOR SOLUTION INTRAMUSCULAR; INTRAVENOUS at 15:10

## 2023-03-08 RX ADMIN — Medication 1250 MG: at 10:10

## 2023-03-08 RX ADMIN — FUROSEMIDE 20 MG: 10 INJECTION, SOLUTION INTRAMUSCULAR; INTRAVENOUS at 11:44

## 2023-03-08 ASSESSMENT — PAIN SCALES - GENERAL
PAINLEVEL_OUTOF10: 0
PAINLEVEL_OUTOF10: 0
PAINLEVEL_OUTOF10: 7
PAINLEVEL_OUTOF10: 7
PAINLEVEL_OUTOF10: 4
PAINLEVEL_OUTOF10: 0
PAINLEVEL_OUTOF10: 3
PAINLEVEL_OUTOF10: 0

## 2023-03-08 NOTE — PROGRESS NOTES
Physician Progress Note      Daja Preciado  CSN #:                  [id number]  :                       1960  ADMIT DATE:       3/6/2023 2:57 PM  [address] [place] DATE:  RESPONDING  PROVIDER #:        Ruby Taylor MD          QUERY TEXT:    Patient admitted with Sepsis. Documentation reflects Type 2 MI in H&P ,   however, it is not in subsequent documentation. If possible, please document   in the progress notes and discharge summary if Type 2 MI was: The medical record reflects the following:  Risk Factors: Tobacco smoking, HTN, Age 62F  Clinical Indicators: Per  H&P documented \"Elevated troponin in the   setting of sepsis, likely MI type II\"   H&P documented Coronary artery spasm, and is not warranted at this time   since she have type II MI  Lab: Troponin  [facility],  89H,  92H  Treatment:  Aspirin chewable tablet, Sodium chloride bolus, IVF    Thank You,  Ashley Jackson  Options provided:  -- Type 2 MI confirmed after study  -- Type 2 MI ruled out after study  -- Other - I will add my own diagnosis  -- Disagree - Not applicable / Not valid  -- Disagree - Clinically unable to determine / Unknown  -- Refer to Clinical Documentation Reviewer    PROVIDER RESPONSE TEXT:    Type 2 MI ruled out after study.     Query created by: Keaton Peralta on 3/8/2023 7:39 AM      Electronically signed by:  Ruby Taylor MD 3/8/2023 9:40 AM

## 2023-03-08 NOTE — PROGRESS NOTES
[facility] Hospitalist Group  Progress Note    Patient: Fannie Gardner Age: 60 y.o. : 1960 MR#: [id number] SSN: [id number]  Date/Time: 3/8/2023     Subjective: Patient lying in the bed, complains of shortness of breath on minimal exertion, cough with no sputum production. Complains of some abdominal pain and diarrhea. Overnight events noted     Assessment/Plan:   Sepsis POA  GPC bacteremia,? Contamination  Acute cystitis with hematuria  C. difficile toxin colitis  Possible essential tremor  Hypoxia due to fluid overload  Left neck adenopathy rule out underlying malignancy  Fall at home  Acute deconditioning  Thrombocytosis  Tobacco smoking dependence  Hypertension    Plan  Chest x-ray noted, will give IV Lasix and monitor  Discussed with ID, will start p.o. vancomycin  We will continue vancomycin and ceftriaxone, follow-up cultures and de-escalate antibiotics. Agree with CT chest abdomen pelvis to rule out underlying malignancy/infection. Based on CT, consider general surgery evaluation for left lymph node biopsy  Neurology input noted, MRI negative. Continue propanolol  BP still elevated, will add amlodipine  Will monitor blood pressure and adjust medications accordingly  Echo noted, mild diastolic failure  PT/OT eval and treatment, recommending SNF    Discussed with patient and also with her daughter Irving Foreman over the phone and explained about my above plan care. Also discussed about CT neck results, concerning for cancer. Explained about possible need for biopsy based on CT chest abdomen pelvis. Both of them understood and agreed with my plan.       Case discussed with:  [x]Patient  [x]Family  [x]Nursing  []Case Management  DVT Prophylaxis:  [x]Lovenox  []Hep SQ  []SCDs  []Coumadin   []Eliquis/Xarelto     Objective:   VS: BP (!) 167/77   Pulse 91   Temp 98.1 °F (36.7 °C) (Oral)   Resp 19   Ht 5' 4\" (1.626 m)   Wt 140 lb (63.5 kg)   SpO2 93%   BMI 24.03 kg/m² Tmax/24hrs: Temp (24hrs), Av.4 °F (36.9 °C), Min:97.6 °F (36.4 °C), Max:99.5 °F (37.5 °C)  IOBRIEF  Intake/Output Summary (Last 24 hours) at 3/8/2023 1418  Last data filed at 3/8/2023 0953  Gross per 24 hour   Intake 2725 ml   Output 1000 ml   Net 1725 ml       General:  Alert, cooperative, no acute distress    HEENT: PERRLA, anicteric sclerae. Left leg 1 inch nodular lymph node, mobile, no tenderness  Pulmonary: Bilateral basal minimal Rales, no wheezing  Cardiovascular: Regular rate and Rhythm. GI:  Soft, Non distended, Non tender. + Bowel sounds. Extremities:  No edema. No calf tenderness. Psych: Good insight. Not anxious or agitated. Neurologic: Alert and oriented X 3. Moves all ext.   Intention tremors+  Additional:    Medications:   Current Facility-Administered Medications   Medication Dose Route Frequency    vancomycin (VANCOCIN) 1250 mg in sodium chloride 0.9% 250 mL IVPB  1,250 mg IntraVENous Q12H    vancomycin (VANCOCIN) oral solution 125 mg  125 mg Oral 4 times per day    amLODIPine (NORVASC) tablet 5 mg  5 mg Oral Daily    cefTRIAXone (ROCEPHIN) 1,000 mg in sterile water 10 mL IV syringe  1,000 mg IntraVENous Q24H    propranolol (INDERAL) tablet 20 mg  20 mg Oral BID    sodium chloride flush 0.9 % injection 5-40 mL  5-40 mL IntraVENous 2 times per day    sodium chloride flush 0.9 % injection 5-40 mL  5-40 mL IntraVENous PRN    0.9 % sodium chloride infusion   IntraVENous PRN    enoxaparin (LOVENOX) injection 40 mg  40 mg SubCUTAneous Daily    ondansetron (ZOFRAN-ODT) disintegrating tablet 4 mg  4 mg Oral Q8H PRN    Or    ondansetron (ZOFRAN) injection 4 mg  4 mg IntraVENous Q6H PRN    polyethylene glycol (GLYCOLAX) packet 17 g  17 g Oral Daily PRN    acetaminophen (TYLENOL) tablet 650 mg  650 mg Oral Q6H PRN    Or    acetaminophen (TYLENOL) suppository 650 mg  650 mg Rectal Q6H PRN    famotidine (PEPCID) tablet 20 mg  20 mg Oral BID    aspirin chewable tablet 81 mg  81 mg Oral Daily therapeutic multivitamin-minerals 1 tablet  1 tablet Oral Daily    busPIRone (BUSPAR) tablet 10 mg  10 mg Oral BID    sertraline (ZOLOFT) tablet 150 mg  150 mg Oral Daily       Labs:    Recent Results (from the past 24 hour(s))   Potassium    Collection Time: 03/07/23  4:59 PM   Result Value Ref Range    Potassium 3.7 3.5 - 5.5 mmol/L   CBC with Auto Differential    Collection Time: 03/08/23  2:58 AM   Result Value Ref Range    WBC 15.2 (H) 4.6 - 13.2 K/uL    RBC 3.92 (L) 4.20 - 5.30 M/uL    Hemoglobin 12.1 12.0 - 16.0 g/dL    Hematocrit 36.4 35.0 - 45.0 %    MCV 92.9 78.0 - 100.0 FL    MCH 30.9 24.0 - 34.0 PG    MCHC 33.2 31.0 - 37.0 g/dL    RDW 18.0 (H) 11.6 - 14.5 %    Platelets 029 (H) 785 - 420 K/uL    MPV 9.6 9.2 - 11.8 FL    Nucleated RBCs 0.0 0  WBC    nRBC 0.00 0.00 - 0.01 K/uL    Seg Neutrophils 76 (H) 40 - 73 %    Lymphocytes 14 (L) 21 - 52 %    Monocytes 8 3 - 10 %    Eosinophils % 1 0 - 5 %    Basophils 1 0 - 2 %    Immature Granulocytes 1 (H) 0.0 - 0.5 %    Segs Absolute 11.5 (H) 1.8 - 8.0 K/UL    Absolute Lymph # 2.2 0.9 - 3.6 K/UL    Absolute Mono # 1.2 0.05 - 1.2 K/UL    Absolute Eos # 0.2 0.0 - 0.4 K/UL    Basophils Absolute 0.1 0.0 - 0.1 K/UL    Absolute Immature Granulocyte 0.1 (H) 0.00 - 0.04 K/UL    Differential Type AUTOMATED     Troponin    Collection Time: 03/08/23  2:58 AM   Result Value Ref Range    Troponin, High Sensitivity 54 0 - 54 ng/L   Lactic Acid    Collection Time: 03/08/23  2:58 AM   Result Value Ref Range    Lactic Acid, Plasma 0.9 0.4 - 2.0 MMOL/L   D-Dimer, Quantitative    Collection Time: 03/08/23  2:58 AM   Result Value Ref Range    D-Dimer, Quant 0.43 <0.46 ug/ml(FEU)   Brain Natriuretic Peptide    Collection Time: 03/08/23  2:58 AM   Result Value Ref Range    NT Pro-BNP 1,795 (H) 0 - 900 PG/ML   Basic Metabolic Panel    Collection Time: 03/08/23  2:58 AM   Result Value Ref Range    Sodium 140 136 - 145 mmol/L    Potassium 3.3 (L) 3.5 - 5.5 mmol/L    Chloride 110 100 - 111 mmol/L    CO2 23 21 - 32 mmol/L    Anion Gap 7 3.0 - 18 mmol/L    Glucose 102 (H) 74 - 99 mg/dL    BUN 9 7.0 - 18 MG/DL    Creatinine 0.32 (L) 0.6 - 1.3 MG/DL    Bun/Cre Ratio 28 (H) 12 - 20      Est, Glom Filt Rate >60 >60 ml/min/1.73m2    Calcium 8.0 (L) 8.5 - 10.1 MG/DL   Magnesium    Collection Time: 03/08/23  2:58 AM   Result Value Ref Range    Magnesium 2.0 1.6 - 2.6 mg/dL   Vancomycin Level, Random    Collection Time: 03/08/23  6:04 AM   Result Value Ref Range    Vancomycin Rm 11.8 5.0 - 40.0 UG/ML   Clostridium difficile toxin/antigen    Collection Time: 03/08/23 10:45 AM    Specimen: Miscellaneous sample; Stool    Stool specimen   Result Value Ref Range    Reflex See Reflex order for C. difficile (DNA)      C Diff Toxin Interpretation (A) NTXCD       Indeterminate for Toxigenic C. difficile, DNA confirmation to follow. C difficile Molecular/PCR    Collection Time: 03/08/23 10:45 AM    Specimen: Miscellaneous sample    Stool specimen   Result Value Ref Range    C. difficile toxin Molecular Positive (A) NEG         Signed By: Ferny Morales MD     March 8, 2023      Disclaimer: Sections of this note are dictated using utilizing voice recognition software. Minor typographical errors may be present. If questions arise, please do not hesitate to contact me or call our department.

## 2023-03-08 NOTE — PLAN OF CARE
Problem: Skin/Tissue Integrity  Goal: Absence of new skin breakdown  3/7/2023 2036 by Atul Griffin RN  Outcome: Progressing  3/7/2023 1515 by Marcello Chow RN  Outcome: Progressing  3/7/2023 0740 by Atul Griffin RN  Outcome: Progressing  3/7/2023 0739 by Atul Griffin RN  Outcome: Progressing     Problem: ABCDS Injury Assessment  Goal: Absence of physical injury  3/7/2023 2036 by Atul Griffin RN  Outcome: Progressing  3/7/2023 1515 by Marcello Chow RN  Outcome: Progressing  3/7/2023 0740 by Atul Griffin RN  Outcome: Progressing  3/7/2023 0739 by Atul Griffin RN  Outcome: Progressing  Flowsheets (Taken 3/7/2023 0025)  Absence of Physical Injury: Implement safety measures based on patient assessment     Problem: Safety - Adult  Goal: Free from fall injury  3/7/2023 2036 by Atul Griffin RN  Outcome: Progressing  3/7/2023 1515 by Marcello Chow RN  Outcome: Progressing  3/7/2023 0740 by Atul Griffin RN  Outcome: Progressing  3/7/2023 0739 by Atul Griffin RN  Outcome: Progressing

## 2023-03-08 NOTE — PROGRESS NOTES
VS as noted. NARD noted, no c/o SOB or CASEY. Skin w/d, pulses palpable unchanged. Fully oriented when awakened for assessment. Will continue to assess.

## 2023-03-08 NOTE — PLAN OF CARE
Problem: Physical Therapy - Adult  Goal: By Discharge: Performs mobility at highest level of function for planned discharge setting. See evaluation for individualized goals. Description: Physical Therapy Goals  Initiated 3/7/2023 and to be accomplished within 7 day(s)  1. Patient will move from supine to sit and sit to supine in bed with supervision/set-up. 2.  Patient will transfer from bed to chair and chair to bed with supervision/set-up using the least restrictive device. 3.  Patient will perform sit to stand with supervision/set-up. 4.  Patient will ambulate with supervision/set-up for 50 feet with the least restrictive device. 5.  Patient will ascend/descend 8 stairs with handrail(s) with minimal assistance/contact guard assist.    PLOF: Independent. Lives with mother. Two story home; bedroom on second floor. Outcome: Progressing     PHYSICAL THERAPY TREATMENT    Patient: Reinaldo Block (89 y.o. female)  Date: 3/8/2023  Diagnosis: Septicemia ([address] [A41.9]  Cervical adenopathy [R59.0]  Elevated troponin [R77.8]  Acute cystitis without hematuria [N30.00]  Sepsis ([address] [A41.9] Sepsis ([address]      Precautions: Fall Risk  PLOF:  see above    ASSESSMENT:  Pt received in bed in NAD, on 3L via NC. Pt requires maximal encouragement to participate with little activity. Pt encouraged. Pt does perform on brief standing trial to RW with min A to stand however then returns self to lying in bed with CGA for safety. Pt educated on importance of mobility and getting OOB. Left in bed with all needs met and call bell within reach. Progression toward goals:   []      Improving appropriately and progressing toward goals  []      Improving slowly and progressing toward goals  []      Not making progress toward goals and plan of care will be adjusted     PLAN:  Patient continues to benefit from skilled intervention to address the above impairments. Continue treatment per established plan of care.     Further Equipment Recommendations for Discharge: rolling walker         AMPAC: Current research shows that an AM-PAC score of 17 (13 without stairs) or less is not associated with a discharge to the patient's home setting. Based on an AM-PAC score of 14/24 (**/20 if omitting stairs) and their current functional mobility deficits, it is recommended that the patient have 3-5 sessions per week of Physical Therapy at d/c to increase the patient's independence. This AMPAC score should be considered in conjunction with interdisciplinary team recommendations to determine the most appropriate discharge setting. Patient's social support, diagnosis, medical stability, and prior level of function should also be taken into consideration. SUBJECTIVE:   Patient stated, \"I dont want to do anything today. \"    OBJECTIVE DATA SUMMARY:   Critical Behavior:  Orientation  Orientation Level: Oriented to place;Oriented to person       Functional Mobility Training:  Bed Mobility:  Bed Mobility Training  Rolling: Minimum assistance  Supine to Sit: Moderate assistance;Contact-guard assistance  Scooting: Moderate assistance;Assist X2 (up in bed)  Transfers:  Transfer Training  Transfer Training: Yes  Sit to Stand: Minimum assistance  Stand to Sit: Minimum assistance  Balance:  Balance  Sitting: Impaired  Sitting - Static: Fair (occasional)  Sitting - Dynamic: Fair (occasional)  Standing: Impaired  Standing - Static: Fair        Pain:  Pain level pre-treatment: endorses back pain but not rated  /10  Pain level post-treatment: \"     \" /10   Pain Intervention(s): Rest, Ice, Repositioning   Response to intervention: Nurse notified    Activity Tolerance:   Activity Tolerance: Patient limited by fatigue  Please refer to the flowsheet for vital signs taken during this treatment.   After treatment:   [] Patient left in no apparent distress sitting up in chair  [x] Patient left in no apparent distress in bed  [x] Call bell left within reach  [x] Nursing notified  [] Caregiver present  [x] Bed alarm activated  [] SCDs applied      COMMUNICATION/EDUCATION:   Patient Education  Education Given To: Patient  Education Provided: Role of Therapy; Energy Conservation; Fall Prevention Strategies; Plan of Care;Transfer Training  Education Method: Demonstration;Verbal;Teach Back  Education Outcome: Verbalized understanding;Demonstrated understanding;Continued education needed      [employer], PT  Minutes: [address] AM-PAC® Basic Mobility Inpatient Short Form (6-Clicks) Version 2    How much HELP from another person does the patient currently need    (If the patient hasn't done an activity recently, how much help from another person do you think he/she would need if he/she tried?)   Total (Total A or Dep)   A Lot  (Mod to Max A)   A Little (Sup or Min A)   None (Mod I to I)   Turning from your back to your side while in a flat bed without using bedrails? [] 1 [] 2 [x] 3 [] 4   2. Moving from lying on your back to sitting on the side of a flat bed without using bedrails? [] 1 [x] 2 [] 3 [] 4   3. Moving to and from a bed to a chair (including a wheelchair)? [] 1 [x] 2 [] 3 [] 4   4. Standing up from a chair using your arms (e.g., wheelchair, or bedside chair)? [] 1 [] 2 [x] 3 [] 4   5. Walking in hospital room? [] 1 [x] 2 [] 3 [] 4   6. Climbing 3-5 steps with a railing?+   [] 1 [x] 2 [] 3 [] 4   +If stair climbing cannot be assessed, skip item #6. Sum responses from items 1-5. Current research shows that an AM-PAC score of 17 (13 without stairs) or less is not associated with a discharge to the patient's home setting. Based on an AM-PAC score of 14/24 (**/20 if omitting stairs) and their current functional mobility deficits, it is recommended that the patient have 3-5 sessions per week of Physical Therapy at d/c to increase the patient's independence.

## 2023-03-08 NOTE — CONSULTS
Infectious Disease Consultation Note        Reason: Gram-positive bloodstream infection, sepsis, cystitis    Current abx Prior abx   Ceftriaxone since 3/6  Vancomycin since 3/6      Lines:       Assessment :   60 y.o. female with medical co-morbidities including Depression, Fibromyalgia, Rheumatoid arthritis, tobacco smoking dependence, presented to [facility] from home on 3/6/23 with recurrent falls for  3 days. Now with leukocytosis, significant pyuria, hematuria, elevated lactate, tachycardia on presentation, positive blood culture    Exact etiology of worsening leukocytosis not entirely clear. Differential diagnosis includes partially treated sepsis due to partially treated urinary tract infection, left kidney pyelonephritis, coagulase-negative Staphylococcus bloodstream infection/SIRS response to undiagnosed malignancy/evolving C. difficile colitis    Tremulousness, necrotic lymph node in neck: rule out paraneoplastic syndrome    1 of 2 sets of blood cultures 3/6 positive for coagulase-negative Staphylococcus: Could be contaminant versus bloodstream infection secondary to urinary tract infection. Discussed with microbiology lab. Urine culture 3/6-> 100,000 colonies of multiple types of coagulase-negative Staphylococcus, diphtheroids -likely contaminated by skin kellie    Hypoxia: likely due to volume overload, COPD    Diarrhea: ? Antibiotic associated versus evolving C. difficile colitis/diverticulitis      Recommendations:    Continue ceftriaxone, vancomycin for now  Follow-up repeat blood cultures sent today.   Follow-up identification of gram-positive cocci in blood culture 3/6  Repeat urine analysis/urine culture today  Obtain stool C. difficile, enteric pathogen panel PCR  Diuresis per primary team  Obtain CT chest/abdomen/pelvis with IV contrast-Will need to wait till later today or tomorrow since patient received IV contrast yesterday  Add on procalcitonin to today's labs  Follow-up neurology recommendations    Thank you for consultation request. Above plan was discussed in details with patient, RN and dr Silvia Oreilly. Please call me if any further questions or concerns. Will continue to participate in the care of this patient. HPI:     60 y.o. female with medical co-morbidities including Depression, Fibromyalgia, Rheumatoid arthritis, tobacco smoking dependence, presented to [address] from home on 3/6/23 with recurrent falls for  3 days. She reported that she started to feel generally tired and her legs gave out on her while she was walking. On 3/6, she reported that she was walking out of her bathroom when her legs gave out on her. She reported feeling generally weak in the last few days. She reported frequent urination but no urinary pain. She reported that over the last 2 months, she noticed that she has been more tremulous with her actions. She reported that she had tremor with feeding herself or attempt to reach objections. In the ER, she was found with sinus tachycardia, leukocytosis, elevated lactic acid, elevated HS troponin, Her urine revealed pyuria, dysuria. She was started on ceftriaxone for suspected cystitis. CT head negative, CT c-spine was negative. She has incidental left-sided adenopathy of her neck. CT abd/pelv without acute pathology. CXR without infiltrates. 1 set of blood culture on admission revealed gram-positive cocci. Vancomycin was added to her regimen. CT neck with contrast on 3/7 revealed findings concerning for necrotic malignant lymph node. Patient noted to have worsening leukocytosis this AM.  I have been consulted for further recommendations. Patient denies any fever, chills. She denies increasing chest pain, shortness of breath, decreased appetite, weight loss. Complains of left flank discomfort. Has had 2 loose bowel movements overnight. Has some left lower abdominal discomfort. Denies nausea, vomiting. No recent sick contacts.   Of note patient was noted to be hypoxic overnight with oxygen saturation in the 80s. Oxygen improved to 92% on 3 L oxygen.   Patient feels more short of breath in the lying down position      Past Medical History:   Diagnosis Date    Arrhythmia     palpitations  heart murmur    Depression     Headache(784.0)     UTI (urinary tract infection)        Past Surgical History:   Procedure Laterality Date    APPENDECTOMY      GYN  1989    partial hysterectomy     ORTHOPEDIC SURGERY  2007    lumbar fusion     TONSILLECTOMY         [unfilled]    Current Facility-Administered Medications   Medication Dose Route Frequency    vancomycin (VANCOCIN) 1250 mg in sodium chloride 0.9% 250 mL IVPB  1,250 mg IntraVENous Q12H    cefTRIAXone (ROCEPHIN) 1,000 mg in sterile water 10 mL IV syringe  1,000 mg IntraVENous Q24H    propranolol (INDERAL) tablet 20 mg  20 mg Oral BID    sodium chloride flush 0.9 % injection 5-40 mL  5-40 mL IntraVENous 2 times per day    sodium chloride flush 0.9 % injection 5-40 mL  5-40 mL IntraVENous PRN    0.9 % sodium chloride infusion   IntraVENous PRN    enoxaparin (LOVENOX) injection 40 mg  40 mg SubCUTAneous Daily    ondansetron (ZOFRAN-ODT) disintegrating tablet 4 mg  4 mg Oral Q8H PRN    Or    ondansetron (ZOFRAN) injection 4 mg  4 mg IntraVENous Q6H PRN    polyethylene glycol (GLYCOLAX) packet 17 g  17 g Oral Daily PRN    acetaminophen (TYLENOL) tablet 650 mg  650 mg Oral Q6H PRN    Or    acetaminophen (TYLENOL) suppository 650 mg  650 mg Rectal Q6H PRN    famotidine (PEPCID) tablet 20 mg  20 mg Oral BID    aspirin chewable tablet 81 mg  81 mg Oral Daily    therapeutic multivitamin-minerals 1 tablet  1 tablet Oral Daily    busPIRone (BUSPAR) tablet 10 mg  10 mg Oral BID    sertraline (ZOLOFT) tablet 150 mg  150 mg Oral Daily       Allergies: Latex and Codeine    Family History   Problem Relation Age of Onset    Cancer Other         breast    Hypertension Father     Coronary Art Dis Other         grandmother    Diabetes Mother Hypertension Mother     Diabetes Father      Social History     Socioeconomic History    Marital status:      Spouse name: Not on file    Number of children: Not on file    Years of education: Not on file    Highest education level: Not on file   Occupational History    Not on file   Tobacco Use    Smoking status: Every Day     Packs/day: 0.50     Types: Cigarettes    Smokeless tobacco: Never   Substance and Sexual Activity    Alcohol use: No    Drug use: No    Sexual activity: Not on file     Comment: Hysterectomy   Other Topics Concern    Not on file   Social History Narrative    Not on file     Social Determinants of Health     Financial Resource Strain: Not on file   Food Insecurity: Not on file   Transportation Needs: Not on file   Physical Activity: Not on file   Stress: Not on file   Social Connections: Not on file   Intimate Partner Violence: Not on file   Housing Stability: Not on file     Social History     Tobacco Use   Smoking Status Every Day    Packs/day: 0.50    Types: Cigarettes   Smokeless Tobacco Never        Temp (24hrs), Av.4 °F (36.9 °C), Min:97.6 °F (36.4 °C), Max:99.5 °F (37.5 °C)    BP (!) 157/80 Comment: Nurse notified  Pulse 87   Temp 97.6 °F (36.4 °C) (Oral)   Resp 20   Ht 5' 4\" (1.626 m)   Wt 140 lb (63.5 kg)   SpO2 92%   BMI 24.03 kg/m²     ROS: 12 point ROS obtained in details. Pertinent positives as mentioned in HPI,   otherwise negative    Physical Exam:    General:  Cooperative, Not in acute distress, speaks in full sentence while in bed, appears weak  HEENT: EOMI, supple neck, no JVD, dry oral mucosa  Cardiovascular: S1S2 rrr, no rub/gallop   Pulmonary: + air entry bilaterally, no wheezing, no crackle, decreased breath sounds bilaterally  GI:  Soft, +LLQ tenderness, non distended, +bs, no guarding/rigidity  Back: left cva tenderness  Extremities:  No pedal edema  Neuro: AOx3, moving all extremities, no gross deficit.     Skin: no rash/ulcers noted    Labs: Results: Chemistry Recent Labs     03/06/23  1530 03/07/23  0351 03/07/23  1659 03/08/23  0258   GLUCOSE 102* 83  --  102*    141  --  140   K 3.6 2.6* 3.7 3.3*    107  --  110   CO2 26 25  --  23   BUN 12 10  --  9   CREATININE 0.47* 0.35*  --  0.32*   GLOB 3.7  --   --   --    ALT 71*  --   --   --    AST 91*  --   --   --       CBC w/Diff Recent Labs     03/06/23  1530 03/07/23  0351 03/08/23  0258   WBC 14.9* 11.7 15.2*   RBC 4.75 4.01* 3.92*   HGB 14.6 12.2 12.1   HCT 43.1 36.0 36.4   * 490* 490*      Microbiology Results       Procedure Component Value Units Date/Time    Culture, Blood 1 [[id number]] Collected: 03/08/23 0604    Order Status: Sent Specimen: Blood Updated: 03/08/23 0632    Blood Culture 1 [[id number]] Collected: 03/06/23 1630    Order Status: Canceled Specimen: Blood     Culture, Urine [[id number]] Collected: 03/06/23 1622    Order Status: Completed Specimen: Urine, clean catch Updated: 03/08/23 0846     Special Requests NO SPECIAL REQUESTS        [place] count --        >100,000  COLONIES/mL       Culture       MIXED UROGENITAL LORI ISOLATED          COVID-19 & Influenza Combo [[id number]] Collected: 03/06/23 1530    Order Status: Completed Specimen: Nasopharyngeal Updated: 03/06/23 1606     SARS-CoV-2, PCR Not detected        Comment: Not Detected results do not preclude SARS-CoV-2 infection and should not be used as the sole basis for patient management decisions. Results must be combined with clinical observations, patient history, and epidemiological information. Rapid Influenza A By PCR Not detected        Rapid Influenza B By PCR Not detected        Comment: Testing was performed using alex Dulce SARS-CoV-2 and Influenza A/B nucleic acid assay.   This test is a multiplex Real-Time Reverse Transcriptase Polymerase Chain Reaction (RT-PCR) based in vitro diagnostic test intended for the qualitative detection of nucleic acids from SARS-CoV-2, Influenza A, and Influenza B in nasopharyngeal for use under the FDA's Emergency Use Authorization(EAU) only. Fact sheet for Patients: FindDrives.pl  Fact sheet for Healthcare Providers: FindDrives.pl         Culture, Blood 1 [[id number]]  (Abnormal) Collected: 03/06/23 1530    Order Status: Completed Specimen: Blood Updated: 03/08/23 0200     Special Requests LAC     Gram stain       AEROBIC BOTTLE Gram positive cocci IN GROUPS                  SMEAR CALLED TO AND CORRECTLY REPEATED BY: MARY KAMINSKI 4N ON [id number] AT 7069 HRS TO 1396. Culture       PROBABLE STAPHYLOCOCCUS SPECIES, COAGULASE NEGATIVE GROWING IN 1 OF 2 BOTTLES DRAWN  SITE = LAC                  Sent to [facility] for ID/Susceptibility if indicated.           Culture, Blood 2 [[id number]] Collected: 03/06/23 1530    Order Status: Completed Specimen: Blood Updated: 03/08/23 0658     Special Requests L ARM     Culture NO GROWTH 2 DAYS       Culture, Blood, PCR ID Panel [[id number]]  (Abnormal) Collected: 03/06/23 1530    Order Status: Completed Specimen: Blood Updated: 03/08/23 0200     Accession Number [id number]     Enterococcus faecalis by PCR Not detected        Enterococcus faecium by PCR Not detected        Listeria monocytogenes by PCR Not detected        STAPHYLOCOCCUS Detected        Staphylococcus Aureus Not detected        Staphylococcus epidermidis by PCR Detected        Staphylococcus lugdunensis by PCR Not detected        STREPTOCOCCUS Not detected        Streptococcus agalactiae (Group B) Not detected        Strep pneumoniae Not detected        Strep pyogenes,(Grp. A) Not detected        Acinetobacter calcoac baumannii complex by PCR Not detected        Bacteroides fragilis by PCR Not detected        Enterobacteriaceae by PCR Not detected        Enterobacter cloacae complex by PCR Not detected        Escherichia Coli Not detected        Klebsiella aerogenes by PCR Not detected        Klebsiella oxytoca by PCR Not detected        Klebsiella pneumoniae group by PCR Not detected        Proteus by PCR Not detected        Salmonella species by PCR Not detected        Serratia marcescens by PCR Not detected        Haemophilus Influenzae by PCR Not detected        Neisseria meningitidis by PCR Not detected        Pseudomonas aeruginosa Not detected        Stenotrophomonas maltophilia by PCR Not detected        Candida albicans by PCR Not detected        Candida auris by PCR Not detected        Candida glabrata Not detected        Candida krusei by PCR Not detected        Candida parapsilosis by PCR Not detected        Candida tropicalis by PCR Not detected        Cryptococcus neoformans/gattii by PCR Not detected        Resistant gene targets            Methicillin Resistance mecA/C  by PCR Not detected        [employer] test comment       False positive results may rarely occur. Correlate with clinical,epidemiologic, and other laboratory findings           Comment: Please see BCID Interpretation Guide in EPIC Links                   RADIOLOGY:    All available imaging studies/reports in [facility] for this admission were reviewed      Disclaimer: Sections of this note are dictated utilizing voice recognition software, which may have resulted in some phonetic based errors in grammar and contents. Even though attempts were made to correct all the mistakes, some may have been missed, and remained in the body of the document. If questions arise, please contact our department.     Dr. Lupe Lora, Infectious Disease Specialist  [phone]  March 8, 2023  9:39 AM

## 2023-03-08 NOTE — PROGRESS NOTES
INTERIM UPDATE - 0207 EST on 3/08/2023    Nursing Staff calls to report that Patient's SpO2 is in the 80s% and is not improving about the high 80s% with NC 3 L/min O2 via NC. Two oximeters were used to verify this, per Nursing Staff. Patient is otherwise asymptomatic. Nursing Staff reports that ausculation is clear and has not changed since Patient was admitted. Chart Review shows that Respiratory Viral Panel was ordered and was negative. Plan:  Ordered STAT Troponin, Lactic Acid, CXR, D-Dimer, and Pro-BNP. Will examine Patient.

## 2023-03-08 NOTE — PLAN OF CARE
Problem: Occupational Therapy - Adult  Goal: By Discharge: Performs self-care activities at highest level of function for planned discharge setting. See evaluation for individualized goals. Description: Occupational Therapy Goals:  Initiated 3/7/2023 to be met within 7-10 days. 1.  Patient will perform upper body dressing with supervision/set-up. 2.  Patient will perform lower body dressing with minimal assistance/contact guard assist, using AE prn.  3.  Patient will perform functional task for 5 minutes while seated on EOB with supervision for balance. 4.  Patient will perform toilet transfers with moderate assistance . 5. Patient will perform all aspects of toileting with moderate assistance . 6. Patient will participate in upper extremity therapeutic exercise/activities with supervision/set-up for 8-10 minutes to increase strength/endurance for ADLs. PLOF: Patient was independent with basic self care tasks and used a [facility] for functional mobility at her baseline. In the last couple of weeks, she has had a decline in function secondary to weakness in BLEs. She stays upstairs in her home and was receiving assistance from her mother. Outcome: Progressing   OCCUPATIONAL THERAPY TREATMENT    Patient: Lucie Allen (25 y.o. female)  Date: 3/8/2023  Diagnosis: Septicemia ([facility] [address] [A41.9]  Cervical adenopathy [R59.0]  Elevated troponin [R77.8]  Acute cystitis without hematuria [N30.00]  Sepsis ([address] [A41.9] Sepsis ([facility] [address]      Precautions: Fall Risk,    PLOF:  Patient was independent with basic self care tasks and used a [facility] for functional mobility at her baseline. In the last couple of weeks, she has had a decline in function secondary to weakness in BLEs. She stays upstairs in her home and was receiving assistance from her mother. Chart, occupational therapy assessment, plan of care, and goals were reviewed. ASSESSMENT:  Pt presented supine in bed upon entry and on 3L O2NC.  Pt requires max encouragement for minimal participation and resistant to most education/techniques to minimize pain and increase independence w/ADLs. Pt assisted to EOB MOD A in prep for functional tasks. Once sitting, pt demo F balance w/ occasional posterior lean requiring assist to require to mid line. STS transfer MIN A with RW then immediately returned back to EOB and returned self to supine requiring CGA. Pt positioned for comfort and educated on importance of OOB activity for increased strength and endurance for ADL carryover. Pt left with HOB elevated, bed alarm active, and all needs left within reach. Progression toward goals:  []          Improving appropriately and progressing toward goals  [x]          Improving slowly and progressing toward goals  []          Not making progress toward goals and plan of care will be adjusted     PLAN:  Patient continues to benefit from skilled intervention to address the above impairments. Continue treatment per established plan of care. Further Equipment Recommendations for Discharge: [facility]:   Current research shows that an AM-PAC score of 17 or less is not associated with a discharge to the patient's home setting. Based on an AM-PAC score of 15/24 and their current ADL deficits; it is recommended that the patient have 3-5 sessions per week of Occupational Therapy at d/c to increase the patient's independence. This [facility] score should be considered in conjunction with interdisciplinary team recommendations to determine the most appropriate discharge setting. Patient's social support, diagnosis, medical stability, and prior level of function should also be taken into consideration. SUBJECTIVE:   Patient stated, \"I am suppose to be going down for testing soon. \"    OBJECTIVE DATA SUMMARY:   Cognitive/Behavioral Status:  Orientation  Orientation Level: Oriented to place;Oriented to person       Functional Mobility and Transfers for ADLs:   Bed Mobility:  Bed Mobility Training  Rolling: Minimum assistance  Supine to Sit: Moderate assistance;Contact-guard assistance  Scooting: Moderate assistance;Assist X2 (up in bed)   Transfers:  Transfer Training  Transfer Training: Yes  Sit to Stand: Minimum assistance  Stand to Sit: Minimum assistance    Balance:  Balance  Sitting: Impaired  Sitting - Static: Fair (occasional)  Sitting - Dynamic: Fair (occasional)  Standing: Impaired  Standing - Static: Fair      Pain:  Pt did not rate pain on scale at this time. Activity Tolerance:    Activity Tolerance: Patient limited by fatigue  Please refer to the flowsheet for vital signs taken during this treatment. After treatment:   []  Patient left in no apparent distress sitting up in chair  [x]  Patient left in no apparent distress in bed  [x]  Call bell left within reach  [x]  Nursing notified  []  Caregiver present  [x]  Bed alarm activated    COMMUNICATION/EDUCATION:          Thank you for this referral.  MAZIN Jordan  Minutes: [employer]49 AM-PAC® Daily Activity Inpatient Short Form (6-Clicks)    How much HELP from another person does the patient currently need    (If the patient hasn't done an activity recently, how much help from another person do you think he/she would need if he/she tried?)   Total (Total A or Dep)   A Lot  (Mod to Max A)   A Little (Sup or Min A)   None (Mod I to I)   Putting on and taking off regular lower body clothing? [] 1 [x] 2 [] 3 [] 4   2. Bathing (including washing, rinsing,      drying)? [] 1 [x] 2 [] 3 [] 4   3. Toileting, which includes using toilet, bedpan or urinal?   [] 1 [x] 2 [] 3 [] 4   4. Putting on and taking off regular upper body clothing? [] 1 [] 2 [x] 3 [] 4   5. Taking care of personal grooming such as brushing teeth? [] 1 [] 2 [x] 3 [] 4   6. Eating meals?    [] 1 [] 2 [x] 3 [] 4

## 2023-03-08 NOTE — PROGRESS NOTES
CM met with pt at bedside to discuss pt's transitional plan of care. Pt stated the need of LTC. CM stated she will seek LTC bed availability and continue to provide updates. CM sent LTC referrals in [place] and [address] [place] [address],[facility].       Yordy White MSW  Care Manager

## 2023-03-08 NOTE — PROGRESS NOTES
[facility] Pharmacokinetic Monitoring Service - Vancomycin    Indication: Sepsis of Unknown Etiology  Target Concentration: Goal AUC/DAISY 400-600 mg*hr/L  Day of Therapy: 2 of 7  Additional Antimicrobials: Cefepime    Pertinent Laboratory Values:   Temp: 97.6 °F (36.4 °C), Weight: 140 lb (63.5 kg)  Recent Labs     03/06/23  1530 03/07/23  0351 03/08/23  0258   CREATININE 0.47* 0.35* 0.32*   BUN 12 10 9   WBC 14.9* 11.7 15.2*   PROCAL <0.05  --   --        Estimated Creatinine Clearance: 155 mL/min (A) (based on SCr of 0.32 mg/dL (L)). Pertinent Cultures:  Culture Date Source Results   3/6 blood Staph 1 of 2   3/8 blood pending   3/6 urine pending   MRSA Nasal Swab: N/A. Non-respiratory infection    Assessment:  Date/Time Current Dose Concentration Timing of Concentration (h) AUC   3/7 1500mg - - -   3/7 1gm q12h 11.8 8.5h 399   3/8 1250mg q12h      Note: Serum concentrations collected for AUC dosing may appear elevated if collected in close proximity to the dose administered, this is not necessarily an indication of toxicity    Plan:  Current regimen is sub-therapeutic.  Adjust to 1250mg q12h  Projected AUCss/T = 497/13.5  Renal labs as indicated   Vancomycin concentration to be repeated 3/10/23  Pharmacy will continue to monitor patient and adjust therapy as indicated    Thank you for the consult,  [address], [facility]  3/8/2023

## 2023-03-09 LAB
ANION GAP SERPL CALC-SCNC: 11 MMOL/L (ref 3–18)
BASOPHILS # BLD: 0.1 K/UL (ref 0–0.1)
BASOPHILS NFR BLD: 1 % (ref 0–2)
BUN SERPL-MCNC: 10 MG/DL (ref 7–18)
BUN/CREAT SERPL: 33 (ref 12–20)
C COLI+JEJUNI TUF STL QL NAA+PROBE: NEGATIVE
CALCIUM SERPL-MCNC: 8 MG/DL (ref 8.5–10.1)
CHLORIDE SERPL-SCNC: 104 MMOL/L (ref 100–111)
CO2 SERPL-SCNC: 21 MMOL/L (ref 21–32)
CREAT SERPL-MCNC: 0.3 MG/DL (ref 0.6–1.3)
DIFFERENTIAL METHOD BLD: ABNORMAL
EC STX1+STX2 GENES STL QL NAA+PROBE: NEGATIVE
EOSINOPHIL # BLD: 0.1 K/UL (ref 0–0.4)
EOSINOPHIL NFR BLD: 1 % (ref 0–5)
ERYTHROCYTE [DISTWIDTH] IN BLOOD BY AUTOMATED COUNT: 17.9 % (ref 11.6–14.5)
ETEC ELTA+ESTB GENES STL QL NAA+PROBE: NEGATIVE
GLUCOSE BLD STRIP.AUTO-MCNC: 94 MG/DL (ref 70–110)
GLUCOSE SERPL-MCNC: 85 MG/DL (ref 74–99)
HCT VFR BLD AUTO: 36.6 % (ref 35–45)
HGB BLD-MCNC: 12.2 G/DL (ref 12–16)
IMM GRANULOCYTES # BLD AUTO: 0.2 K/UL (ref 0–0.04)
IMM GRANULOCYTES NFR BLD AUTO: 1 % (ref 0–0.5)
LYMPHOCYTES # BLD: 1.5 K/UL (ref 0.9–3.6)
LYMPHOCYTES NFR BLD: 10 % (ref 21–52)
MAGNESIUM SERPL-MCNC: 2 MG/DL (ref 1.6–2.6)
MCH RBC QN AUTO: 31 PG (ref 24–34)
MCHC RBC AUTO-ENTMCNC: 33.3 G/DL (ref 31–37)
MCV RBC AUTO: 93.1 FL (ref 78–100)
MONOCYTES # BLD: 1.4 K/UL (ref 0.05–1.2)
MONOCYTES NFR BLD: 10 % (ref 3–10)
NEUTS SEG # BLD: 11.6 K/UL (ref 1.8–8)
NEUTS SEG NFR BLD: 78 % (ref 40–73)
NRBC # BLD: 0 K/UL (ref 0–0.01)
NRBC BLD-RTO: 0 PER 100 WBC
P SHIGELLOIDES DNA STL QL NAA+PROBE: NEGATIVE
PLATELET # BLD AUTO: 510 K/UL (ref 135–420)
PMV BLD AUTO: 10.1 FL (ref 9.2–11.8)
POTASSIUM SERPL-SCNC: 2.8 MMOL/L (ref 3.5–5.5)
RBC # BLD AUTO: 3.93 M/UL (ref 4.2–5.3)
SALMONELLA SP SPAO STL QL NAA+PROBE: NEGATIVE
SHIGELLA SP+EIEC IPAH STL QL NAA+PROBE: NEGATIVE
SODIUM SERPL-SCNC: 136 MMOL/L (ref 136–145)
V CHOL+PARA+VUL DNA STL QL NAA+NON-PROBE: NEGATIVE
WBC # BLD AUTO: 14.9 K/UL (ref 4.6–13.2)
Y ENTEROCOL DNA STL QL NAA+NON-PROBE: NEGATIVE

## 2023-03-09 PROCEDURE — 6360000002 HC RX W HCPCS: Performed by: INTERNAL MEDICINE

## 2023-03-09 PROCEDURE — 6370000000 HC RX 637 (ALT 250 FOR IP): Performed by: HOSPITALIST

## 2023-03-09 PROCEDURE — 1100000000 HC RM PRIVATE

## 2023-03-09 PROCEDURE — 85025 COMPLETE CBC W/AUTO DIFF WBC: CPT

## 2023-03-09 PROCEDURE — 80048 BASIC METABOLIC PNL TOTAL CA: CPT

## 2023-03-09 PROCEDURE — 2580000003 HC RX 258: Performed by: INTERNAL MEDICINE

## 2023-03-09 PROCEDURE — 83735 ASSAY OF MAGNESIUM: CPT

## 2023-03-09 PROCEDURE — 97535 SELF CARE MNGMENT TRAINING: CPT

## 2023-03-09 PROCEDURE — 2580000003 HC RX 258: Performed by: HOSPITALIST

## 2023-03-09 PROCEDURE — 6370000000 HC RX 637 (ALT 250 FOR IP): Performed by: EMERGENCY MEDICINE

## 2023-03-09 PROCEDURE — 82962 GLUCOSE BLOOD TEST: CPT

## 2023-03-09 PROCEDURE — 99232 SBSQ HOSP IP/OBS MODERATE 35: CPT | Performed by: INTERNAL MEDICINE

## 2023-03-09 PROCEDURE — 94761 N-INVAS EAR/PLS OXIMETRY MLT: CPT

## 2023-03-09 PROCEDURE — 6360000002 HC RX W HCPCS: Performed by: HOSPITALIST

## 2023-03-09 PROCEDURE — 6370000000 HC RX 637 (ALT 250 FOR IP): Performed by: INTERNAL MEDICINE

## 2023-03-09 PROCEDURE — 36415 COLL VENOUS BLD VENIPUNCTURE: CPT

## 2023-03-09 RX ADMIN — VANCOMYCIN HYDROCHLORIDE 125 MG: 1 INJECTION, POWDER, LYOPHILIZED, FOR SOLUTION INTRAVENOUS at 17:49

## 2023-03-09 RX ADMIN — VANCOMYCIN HYDROCHLORIDE 125 MG: 1 INJECTION, POWDER, LYOPHILIZED, FOR SOLUTION INTRAVENOUS at 12:01

## 2023-03-09 RX ADMIN — VANCOMYCIN HYDROCHLORIDE 125 MG: 1 INJECTION, POWDER, LYOPHILIZED, FOR SOLUTION INTRAVENOUS at 05:31

## 2023-03-09 RX ADMIN — ENOXAPARIN SODIUM 40 MG: 100 INJECTION SUBCUTANEOUS at 12:00

## 2023-03-09 RX ADMIN — ASPIRIN 81 MG CHEWABLE TABLET 81 MG: 81 TABLET CHEWABLE at 12:01

## 2023-03-09 RX ADMIN — FAMOTIDINE 20 MG: 20 TABLET ORAL at 09:18

## 2023-03-09 RX ADMIN — PROPRANOLOL HYDROCHLORIDE 20 MG: 10 TABLET ORAL at 21:52

## 2023-03-09 RX ADMIN — SODIUM CHLORIDE, PRESERVATIVE FREE 10 ML: 5 INJECTION INTRAVENOUS at 21:53

## 2023-03-09 RX ADMIN — SODIUM CHLORIDE, PRESERVATIVE FREE 10 ML: 5 INJECTION INTRAVENOUS at 09:23

## 2023-03-09 RX ADMIN — BUSPIRONE HYDROCHLORIDE 10 MG: 5 TABLET ORAL at 21:52

## 2023-03-09 RX ADMIN — AMLODIPINE BESYLATE 5 MG: 5 TABLET ORAL at 09:19

## 2023-03-09 RX ADMIN — Medication 1250 MG: at 09:39

## 2023-03-09 RX ADMIN — SERTRALINE 150 MG: 50 TABLET, FILM COATED ORAL at 09:17

## 2023-03-09 RX ADMIN — POTASSIUM BICARBONATE 40 MEQ: 782 TABLET, EFFERVESCENT ORAL at 05:00

## 2023-03-09 RX ADMIN — BUSPIRONE HYDROCHLORIDE 10 MG: 5 TABLET ORAL at 09:17

## 2023-03-09 RX ADMIN — PROPRANOLOL HYDROCHLORIDE 20 MG: 10 TABLET ORAL at 09:14

## 2023-03-09 RX ADMIN — FAMOTIDINE 20 MG: 20 TABLET ORAL at 21:52

## 2023-03-09 RX ADMIN — CEFTRIAXONE 1000 MG: 1 INJECTION, POWDER, FOR SOLUTION INTRAMUSCULAR; INTRAVENOUS at 17:49

## 2023-03-09 RX ADMIN — POTASSIUM BICARBONATE 40 MEQ: 782 TABLET, EFFERVESCENT ORAL at 09:19

## 2023-03-09 ASSESSMENT — PAIN SCALES - GENERAL
PAINLEVEL_OUTOF10: 0

## 2023-03-09 NOTE — PROGRESS NOTES
Re:  Bess Mendoza,Follow up visit     3/8/2023 7:24 PM    SSN: [id number]    Subjective:   Adelia Mendoza was seen on follow-up. Continues to have the tremor. Also generalized weakness. Denied any fever, sweating/chills. She has loose stool/diarrhea. Her C. difficile is positive. Current MRI of the brain which did not show any acute changes;had shown extensive, confluent white matter changes consistent with microvascular ischemic disease. On propranolol.     Medications:    Current Facility-Administered Medications   Medication Dose Route Frequency Provider Last Rate Last Admin    vancomycin (VANCOCIN) 1250 mg in sodium chloride 0.9% 250 mL IVPB  1,250 mg IntraVENous Q12H Reena Vera MD   Stopped at 03/08/23 1042    vancomycin (VANCOCIN) oral solution 125 mg  125 mg Oral 4 times per day Reena Vera MD   125 mg at 03/08/23 1510    amLODIPine (NORVASC) tablet 5 mg  5 mg Oral Daily Reena Vera MD   5 mg at 03/08/23 1510    cefTRIAXone (ROCEPHIN) 1,000 mg in sterile water 10 mL IV syringe  1,000 mg IntraVENous Q24H Reena Vera MD   1,000 mg at 03/08/23 1510    propranolol (INDERAL) tablet 20 mg  20 mg Oral BID Reena Vera MD   20 mg at 03/08/23 1000    sodium chloride flush 0.9 % injection 5-40 mL  5-40 mL IntraVENous 2 times per day Damari Juares MD   10 mL at 03/08/23 0953    sodium chloride flush 0.9 % injection 5-40 mL  5-40 mL IntraVENous PRN Damari Juares MD        0.9 % sodium chloride infusion   IntraVENous PRN Damari Juares MD        enoxaparin (LOVENOX) injection 40 mg  40 mg SubCUTAneous Daily Rosie Adame MD   40 mg at 03/08/23 0952    ondansetron (ZOFRAN-ODT) disintegrating tablet 4 mg  4 mg Oral Q8H PRN Rosie Adame MD        Or    ondansetron (ZOFRAN) injection 4 mg  4 mg IntraVENous Q6H PRN Rosie Adame MD        polyethylene glycol (GLYCOLAX) packet 17 g  17 g Oral Daily PRN Damari Juares MD        acetaminophen (TYLENOL) tablet 650 mg  650 mg Oral Q6H PRN Rosie Edmondson MD        Or    acetaminophen (TYLENOL) suppository 650 mg  650 mg Rectal Q6H PRN Rosie Edmondson MD        famotidine (PEPCID) tablet 20 mg  20 mg Oral BID Keyana Delacruz MD   20 mg at 03/07/23 2130    aspirin chewable tablet 81 mg  81 mg Oral Daily Rosie Edmondson MD   81 mg at 03/08/23 8590    therapeutic multivitamin-minerals 1 tablet  1 tablet Oral Daily Rosie Edmondson MD   1 tablet at 03/07/23 0838    busPIRone (BUSPAR) tablet 10 mg  10 mg Oral BID Keyana Delacruz MD   10 mg at 03/08/23 0953    sertraline (ZOLOFT) tablet 150 mg  150 mg Oral Daily Rosie Edmondson MD   150 mg at 03/08/23 0953       Vital signs:  BP (!) 153/73   Pulse 87   Temp 98.4 °F (36.9 °C) (Oral)   Resp 18   Ht 5' 4\" (1.626 m)   Wt 140 lb (63.5 kg)   SpO2 94%   BMI 24.03 kg/m²     Review of Systems:   Constitutional no fever or chills  Skin denies rash or itching  HENT no changes in hearing  Eyes denies diplopia vision lose  Respiratory denies shortness of breath  Cardiovascular denies chest pain, dyspnea. Gastrointestinal denies nausea, vomiting; has loose stool  Genitourinary denies incontinence. No dysuria. Musculoskeletal denies joint pain   Hematology denies  bleeding   Neurological as above in HPI      Patient Active Problem List   Diagnosis    Severe recurrent depression with psychosis ([address]    Lumbar radiculopathy    Rheumatoid aortitis    Fibromyalgia    Urgency incontinence    Lumbosacral spondylosis without myelopathy    Spasm of muscle    Generalized anxiety disorder    Chronic pain syndrome    Postlaminectomy syndrome, lumbar region    Lumbar degenerative disc disease    Sepsis ([address]    Essential tremor    Gait difficulty         Objective:   GENERAL:                  In no apparent distress. EXTREMITIES:           Muscle tone is normal.  HEAD:                         The patient is normocephalic.      NEUROLOGIC EXAMINATION    Mental status: Awake, alert, oriented x3, follows simple and complex commands, no neglect, no extinction. Speech and languge: fluent, coherent,  and comprehension intact  CN: VFF, EOMI, PERRLA, face sensation intact , no facial asymmetry noted, palate elevation symmetric bilat, SS+SCM 5/5 bilat, tongue midline  Motor: no pronator drift, tone normal throughout, strength 5/5 throughout except for some poor effort from pain. Sensory: intact to light touch throughout  Coordination: Postural tremor of the upper extremities more on the right side; FNF accurate w/o dysmetria. Intact rapid alternating movement. Gait: not tested         MR BRAIN WITHOUT CONTRAST       HISTORY: Tremors, weakness       COMPARISON: CT head 3/6/2023, 1/26/2013       TECHNIQUE: Axial and sagittal T1W scans, axial T2W scans, axial FLAIR, axial   swan, and axial diffusion weighted images. FINDINGS:        Brain parenchyma: No acute infarct, hemorrhage, or mass effect. Extensive,   confluent white matter and pontine T2 FLAIR changes suggestive of chronic   microvascular ischemic disease. Extra-axial spaces, ventricles, basal cisterns: No extra-axial fluid collections   are identified. The ventricles and sulci are within normal limits for patient's   age. Basal cisterns are patent. Orbits and paranasal sinuses: Orbits are within normal limits for technique. Included paranasal sinuses are clear. Calvarium and skull base: Calvarium is normal limits. The sella turcica is   within normal limits. Visualized upper cervical spine: Craniocervical junction is within normal   limits. Other: Partially visualized cervical adenopathy/lesion at the left upper neck   better evaluated on CT neck. Impression       1. No acute intracranial process identified. 2.  Extensive, confluent white matter changes most consistent with moderate to   advanced advanced microvascular ischemic disease, progressed compared to prior   MRI.           Impression:  A 60years old female patient with medical history as mentioned above  admitted to the hospital for generalized body weakness and multiple falls. Found to have leukocytosis, lactic acidosis, and hypokalemia. Has possible UTI with hematuria. Cultures pending. Currently on ceftriaxone and vancomycin. Neurology consulted for tremor. Has been having tremor for the past 1 year. Is getting worse. Description of possible essential tremor. No symptoms/signs of PD.  CT scan of the brain did not show any acute changes; possible white matter disease from microvascular ischemia. MRI did not show any acute changes; extensive microvascular ischemic changes were seen. Has a family history of Parkinson's disease: Her grandfather. Patient has depression. Has been on risperidone. But she states, has been having the tremor even before this medication. Has been falling a lot over the past 3 days. Feeling unsteady. Tremor is most likely essential tremor. Plan:     -Continue with propranolol 20 mg p.o. twice daily  -Management of  infection per the primary team  -Follow-up in the neurology clinic in 1 to 2 months time for possible essential tremor  -We will sign off    Sincerely,      Sinan Greco M.D. PLEASE NOTE:   This document has been produced using voice recognition software. Unrecognized errors in transcription may be present.

## 2023-03-09 NOTE — ACP (ADVANCE CARE PLANNING)
Advance Care Planning     Advance Care Planning Inpatient Note  [facility]    Today's Date: 3/9/2023  Unit: [address]    Received request from rounding and admission screening. Upon review of chart and communication with care team, patient's decision making abilities are not in question. . Patient was/were present in the room during visit. Goals of ACP Conversation:  Discuss advance care planning documents    Health Care Decision Makers:     No healthcare decision makers have been documented. Click here to complete [address] including selection of the Healthcare Decision Maker Relationship (ie \"Primary\")  Summary:  Documented Next of Kin, per patient report  Patient declined to complete an Advance Medical Directive but named her Mary Rao (mother) at [phone] and Ozzie Montgomery (daughter) as her emergency contacts. Advance Care Planning Documents (Patient Wishes):  None     Assessment:  Patient's ability to make healthcare decisions is not in question, however, patient was not comfortable to complete an Advance Medical Directive at this time despite thorough education on risks and benefits of not having vs having one at hand.   Interventions:  Provided education on documents for clarity and greater understanding  Discussed and provided education on state decision maker hierarchy  Encouraged ongoing ACP conversation with future decision makers and loved ones  Reviewed but did not complete ACP document    Care Preferences Communicated:   No    Outcomes/Plan:  ACP Discussion: Refused    Electronically signed by Mejia Troy [facility] on 3/9/2023 at 5:51 PM

## 2023-03-09 NOTE — PROGRESS NOTES
Bedside and Verbal shift change report given to [facility] (oncoming nurse) by Fanny Troy (offgoing nurse). Report included the following information Nurse Handoff Report, Intake/Output, MAR, and Cardiac Rhythm NSR .

## 2023-03-09 NOTE — PLAN OF CARE
Problem: Skin/Tissue Integrity  Goal: Absence of new skin breakdown  Description: 1. Monitor for areas of redness and/or skin breakdown  2. Assess vascular access sites hourly  3. Every 4-6 hours minimum:  Change oxygen saturation probe site  4. Every 4-6 hours:  If on nasal continuous positive airway pressure, respiratory therapy assess nares and determine need for appliance change or resting period. Outcome: Progressing     Problem: ABCDS Injury Assessment  Goal: Absence of physical injury  Outcome: Progressing     Problem: Safety - Adult  Goal: Free from fall injury  Outcome: Progressing     Problem: Occupational Therapy - Adult  Goal: By Discharge: Performs self-care activities at highest level of function for planned discharge setting. See evaluation for individualized goals. Description: Occupational Therapy Goals:  Initiated 3/7/2023 to be met within 7-10 days. 1.  Patient will perform upper body dressing with supervision/set-up. 2.  Patient will perform lower body dressing with minimal assistance/contact guard assist, using AE prn.  3.  Patient will perform functional task for 5 minutes while seated on EOB with supervision for balance. 4.  Patient will perform toilet transfers with moderate assistance . 5. Patient will perform all aspects of toileting with moderate assistance . 6. Patient will participate in upper extremity therapeutic exercise/activities with supervision/set-up for 8-10 minutes to increase strength/endurance for ADLs. PLOF: Patient was independent with basic self care tasks and used a [facility] for functional mobility at her baseline. In the last couple of weeks, she has had a decline in function secondary to weakness in BLEs. She stays upstairs in her home and was receiving assistance from her mother.        3/8/2023 1317 by Selby Primrose Poyser, OTA  Outcome: Progressing     Problem: Pain  Goal: Verbalizes/displays adequate comfort level or baseline comfort level  Outcome: Progressing

## 2023-03-09 NOTE — CARE COORDINATION
CM completed LTSS/UAI screening to assist with transition of care plan to long term care. CM is awaiting physician signature. Screening ID #: [id number]    2:51 PM CM notified  via [employer] of the above details.            Shea Reilly MSW  Care Management

## 2023-03-09 NOTE — PROGRESS NOTES
conducted an initial consultation and Spiritual Assessment for Adelia Mendoza, who is a 60 y.o.,female. Patients Primary Language is: [place]. According to the patients EMR Jew Affiliation is: [place]. The reason the Patient came to the hospital is:   Patient Active Problem List    Diagnosis Date Noted    Essential tremor 03/07/2023    Gait difficulty 03/07/2023    Sepsis ([facility] 03/06/2023    Severe recurrent depression with psychosis ([facility] 07/22/2019    Generalized anxiety disorder 07/22/2019    Urgency incontinence 07/03/2017    Lumbar radiculopathy 03/01/2017    Lumbar degenerative disc disease 03/01/2017    Rheumatoid aortitis     Chronic pain syndrome 02/12/2013    Fibromyalgia 08/12/2011    Spasm of muscle 08/12/2011    Postlaminectomy syndrome, lumbar region 08/12/2011    Lumbosacral spondylosis without myelopathy         The  provided the following Interventions:  Initiated a relationship of care and support. Explored issues of sherri, belief, spirituality and Amish/ritual needs while hospitalized. Listened empathically. Provided chaplaincy education. Provided information about Spiritual Care Services. Offered prayer and assurance of continued prayers on patient's behalf. Chart reviewed. Patient was educated and offered assistance in completing an Advance Medical Directive. Patient named Christal Sales (mother) at [phone] and Alicia Frias (daughter) as his     The following outcomes where achieved:  Patient shared limited information about both her medical narrative and spiritual journey/beliefs.  confirmed Patient's Yarsanism Jew Affiliation. Patient processed feeling about current hospitalization. Patient reports that she lives with mom \"We take care of each other. \"    Patient declined to complete an AMD at this time. Patient expressed gratitude for 's visit.     Assessment:  Patient does not have any Amish/cultural needs that will affect patients preferences in health care. There are no spiritual or Pentecostal issues which require intervention at this time. Plan:  Chaplains will continue to follow and will provide pastoral care on an as needed/requested basis.  recommends bedside caregivers page  on duty if patient shows signs of acute spiritual or emotional distress.     Markus Cannon 5   [phone]

## 2023-03-09 NOTE — PLAN OF CARE
Problem: Occupational Therapy - Adult  Goal: By Discharge: Performs self-care activities at highest level of function for planned discharge setting. See evaluation for individualized goals. Description: Occupational Therapy Goals:  Initiated 3/7/2023 to be met within 7-10 days. 1.  Patient will perform upper body dressing with supervision/set-up. 2.  Patient will perform lower body dressing with minimal assistance/contact guard assist, using AE prn.  3.  Patient will perform functional task for 5 minutes while seated on EOB with supervision for balance. 4.  Patient will perform toilet transfers with moderate assistance . 5. Patient will perform all aspects of toileting with moderate assistance . 6. Patient will participate in upper extremity therapeutic exercise/activities with supervision/set-up for 8-10 minutes to increase strength/endurance for ADLs. PLOF: Patient was independent with basic self care tasks and used a [facility] for functional mobility at her baseline. In the last couple of weeks, she has had a decline in function secondary to weakness in BLEs. She stays upstairs in her home and was receiving assistance from her mother. Outcome: Progressing   OCCUPATIONAL THERAPY TREATMENT    Patient: Terence Davidson (74 y.o. female)  Date: 3/9/2023  Diagnosis: Septicemia ([facility] [A41.9]  Cervical adenopathy [R59.0]  Elevated troponin [R77.8]  Acute cystitis without hematuria [N30.00]  Sepsis ([facility] [A41.9] Sepsis ([facility]      Precautions: Fall Risk    Chart, occupational therapy assessment, plan of care, and goals were reviewed. ASSESSMENT:  Pt requires max encouragement for minimal participation. Pt requires assist w/trunk to maneuver to EOB and tolerates < 1 minute sitting EOB 2/2 c/o dizziness. Pt returns herself to supine and declines further OT intervention. Educated on importance OOB and UE Therex to increase activity tolerance for carryover w/ADLs.      Progression toward goals:  [] Improving appropriately and progressing toward goals  [x]          Improving slowly and progressing toward goals  []          Not making progress toward goals and plan of care will be adjusted     PLAN:  Patient continues to benefit from skilled intervention to address the above impairments. Continue treatment per established plan of care. Further Equipment Recommendations for Discharge: shower chair and rolling walker    AMPAC:   Current research shows that an AM-PAC score of 17 or less is not associated with a discharge to the patient's home setting. Based on an AM-PAC score of 15/24 and their current ADL deficits; it is recommended that the patient have 3-5 sessions per week of Occupational Therapy at d/c to increase the patient's independence. This AMPAC score should be considered in conjunction with interdisciplinary team recommendations to determine the most appropriate discharge setting. Patient's social support, diagnosis, medical stability, and prior level of function should also be taken into consideration. SUBJECTIVE:   Patient stated, \"My Mom will be here soon. \"    OBJECTIVE DATA SUMMARY:   Cognitive/Behavioral Status:  Orientation  Orientation Level: Disoriented to time    Functional Mobility and Transfers for ADLs:   Bed Mobility:  Bed Mobility Training  Rolling: Contact-guard assistance  Supine to Sit: Moderate assistance  Sit to Supine: Stand-by assistance  Scooting: Moderate assistance    ADL Intervention:  Feeding: Supervision  Pt performs ADL item (cup) retrieval from tray table     Pain:  Pain level pre-treatment: 0/10   Pain level post-treatment: 0/10  Pt c/o b/l knee pain, although pain not rated.      Activity Tolerance:    Poor    After treatment:   []  Patient left in no apparent distress sitting up in chair  [x]  Patient left in no apparent distress in bed  [x]  Call bell left within reach  [x]  Nursing notified  []  Caregiver present  []  Bed alarm activated    COMMUNICATION/EDUCATION:   Patient Education  Education Given To: Patient  Education Provided: Role of Therapy;Plan of Care;Energy Conservation  Education Method: Verbal;Teach Back  Barriers to Learning: Other (Comment) (depression/pt self-limiting)  Education Outcome: Continued education needed      Thank you for this referral.  MAZIN Stevens  Minutes: [address] [facility] AM-PAC® Daily Activity Inpatient Short Form (6-Clicks)*    How much HELP from another person does the patient currently need    (If the patient hasn't done an activity recently, how much help from another person do you think he/she would need if he/she tried?)   Total (Total A or Dep)   A Lot  (Mod to Max A)   A Little (Sup or Min A)   None (Mod I to I)   Putting on and taking off regular lower body clothing? [] 1 [x] 2 [] 3 [] 4   2. Bathing (including washing, rinsing,      drying)? [] 1 [x] 2 [] 3 [] 4   3. Toileting, which includes using toilet, bedpan or urinal?   [] 1 [x] 2 [] 3 [] 4   4. Putting on and taking off regular upper body clothing? [] 1 [] 2 [x] 3 [] 4   5. Taking care of personal grooming such as brushing teeth? [] 1 [] 2 [x] 3 [] 4   6. Eating meals?    [] 1 [] 2 [x] 3 [] 4

## 2023-03-09 NOTE — CONSULTS
Interventional Radiology Consult Note  Patient: Jessica Sarabia               Sex: female          DOA: 3/6/2023       YOB: 1960      Age:  60 y.o.        LOS:  LOS: 3 days              Assessment/plan   Incidental imaging finding of left neck lymph adenopathy on cervical spine imaging  SIRS 2/4 on presentation (WBC 14.9, tachycardia); bcx 3/6/2023 with CoNS (repeat bcx 3/8 with NG at 1 day)  Acute cystitis with hematuria (UA 3/6/2023 with hematuria, proteinuria, ketonuria trace LE; ucx 3/6/2023 with mixed kellie)  Generalized weakness x1 week with associated falls PTA   Action tremor x2 months -- Neurology to follow-up as OP for possible essential tremor  Hypokalemia (K+ 2.8 on 3/9)  C. difficile positive    Image-guided biopsy of left neck lymph node is indicated for diagnosis and to guide further management in the setting of concern for malignancy. Image guided biopsy of left neck lymph node with moderate sedation tomorrow as schedule allows  Consent obtained from patient at bedside  Additional specimen orders per referring team    - NPO after midnight tonight  - Hold Lovenox on 3/10  - Labs reviewed -- okay to proceed pending repeat PT/INR and K+ correction    Thank you,  Trevor De León PA-C  [id number]    HPI:     Consult for left neck mass received from Papi Jordan MD and reviewed with Dr. Sherryle Croak. Jessica Sarabia is a 60 y.o. female smoker with a PMH of depression, anxiety, chronic back pain, HTN who presented to [facility] on 3/6/2023 for generalized weakness x3 days with 3 associated recent falls and urinary frequency/dysuria. Patient also reported 2 months tremor with activity, especially noticeable when attempting to write, feed herself, or reach for something. ED evaluation was significant for tachycardia, leukocytosis, and elevated lactic acid; UA revealed hematuria, proteinuria, ketonuria, and patient was started on ceftriaxone for suspected UTI.   CT cervical spine without acute traumatic findings of the cervical spine, but with incidental finding of left neck adenopathy. Patient was admitted for further evaluation and management. Follow-up CT soft tissue neck with contrast (3/7/2023) revealed large left neck ovoid lesion (2.2 x 2.1 x 3.5 cm). IR was engaged assistance with further evaluation in the setting of concern for possible malignancy. Today, patient reports generalized weakness and continued action tremor but is otherwise without complaint. Patient denies fever/chills, unintentional weight loss, malaise/fatigue, abdominal pain, nausea/vomiting. Patient does report chronic intermittent posterior cervical neck pain, which she describes as longstanding and unchanged. Patient has not noticed left neck mass and denies dysphonia or dysphagia. The anticipated procedure was discussed in detail including risk of injury, infection, and bleeding. All questions were answered and concerns addressed. Informed consents were obtained. Past Medical History:   Diagnosis Date    Arrhythmia     palpitations  heart murmur    Depression     Headache(784.0)     UTI (urinary tract infection)      Past Surgical History:   Procedure Laterality Date    APPENDECTOMY      GYN  1989    partial hysterectomy     ORTHOPEDIC SURGERY  2007    lumbar fusion     TONSILLECTOMY       Family History   Problem Relation Age of Onset    Cancer Other         breast    Hypertension Father     Coronary Art Dis Other         grandmother    Diabetes Mother     Hypertension Mother     Diabetes Father      Social History     Socioeconomic History    Marital status:    Tobacco Use    Smoking status: Every Day     Packs/day: 0.50     Types: Cigarettes    Smokeless tobacco: Never   Substance and Sexual Activity    Alcohol use: No    Drug use: No     Prior to Admission medications    Medication Sig Start Date End Date Taking?  Authorizing Provider   busPIRone (BUSPAR) 10 MG tablet take 1 tablet by mouth twice a day 2/27/23   Historical Provider, MD   risperiDONE (RISPERDAL) 1 MG tablet take 1 tablet by mouth at bedtime 2/12/23   Historical Provider, MD   sertraline (ZOLOFT) 100 MG tablet take 1 and 1/2 tablets by mouth once daily 2/23/23   Historical Provider, MD   zolpidem (AMBIEN) 10 MG tablet take 1 tablet by mouth at bedtime if needed 2/12/23   Historical Provider, MD     Allergies   Allergen Reactions    Latex Itching    Codeine        Review of Systems  As above    Physical Exam:      Visit Vitals  BP (!) 150/78   Pulse 76   Temp 98.4 °F (36.9 °C) (Oral)   Resp 16   Ht 5' 4\" (1.626 m)   Wt 140 lb 10.5 oz (63.8 kg)   SpO2 95%   BMI 24.14 kg/m²       Physical Exam:  Gen: A&Ox4, NAD  Respiratory: Normal work of breathing, equal chest rise and fall  Cardiovascular: RRR  Gastrointestinal: Soft, NT/ND  Extremities: No BLE edema, moves all four spontaneously  Skin: Warm and dry    Labs Reviewed:  CMP:   Lab Results   Component Value Date/Time    Sodium 136 03/09/2023 01:14 AM    Potassium 2.8 (LL) 03/09/2023 01:14 AM    Chloride 104 03/09/2023 01:14 AM    CO2 21 03/09/2023 01:14 AM    BUN 10 03/09/2023 01:14 AM    Magnesium 2.0 03/09/2023 01:14 AM     CBC:   Lab Results   Component Value Date/Time    WBC 14.9 (H) 03/09/2023 01:14 AM    Hemoglobin 12.2 03/09/2023 01:14 AM    Hematocrit 36.6 03/09/2023 01:14 AM    Platelets 972 (H) 47/36/0369 01:14 AM     COAGS:   PT/INR ordered    Blood Thinners:  Lovenox 40 mg SC -- hold on 3/10

## 2023-03-10 ENCOUNTER — HOSPITAL ENCOUNTER (INPATIENT)
Facility: HOSPITAL | Age: 63
DRG: 720 | End: 2023-03-10
Payer: COMMERCIAL

## 2023-03-10 VITALS
OXYGEN SATURATION: 95 % | HEART RATE: 81 BPM | RESPIRATION RATE: 16 BRPM | SYSTOLIC BLOOD PRESSURE: 146 MMHG | DIASTOLIC BLOOD PRESSURE: 69 MMHG

## 2023-03-10 LAB
ANION GAP SERPL CALC-SCNC: 5 MMOL/L (ref 3–18)
BACTERIA SPEC CULT: NORMAL
BASOPHILS # BLD: 0.1 K/UL (ref 0–0.1)
BASOPHILS NFR BLD: 1 % (ref 0–2)
BUN SERPL-MCNC: 13 MG/DL (ref 7–18)
BUN/CREAT SERPL: 41 (ref 12–20)
CALCIUM SERPL-MCNC: 8.5 MG/DL (ref 8.5–10.1)
CHLORIDE SERPL-SCNC: 106 MMOL/L (ref 100–111)
CO2 SERPL-SCNC: 26 MMOL/L (ref 21–32)
CREAT SERPL-MCNC: 0.32 MG/DL (ref 0.6–1.3)
DIFFERENTIAL METHOD BLD: ABNORMAL
EOSINOPHIL # BLD: 0.2 K/UL (ref 0–0.4)
EOSINOPHIL NFR BLD: 2 % (ref 0–5)
ERYTHROCYTE [DISTWIDTH] IN BLOOD BY AUTOMATED COUNT: 17.9 % (ref 11.6–14.5)
GLUCOSE SERPL-MCNC: 99 MG/DL (ref 74–99)
HCT VFR BLD AUTO: 37.2 % (ref 35–45)
HGB BLD-MCNC: 12.4 G/DL (ref 12–16)
IMM GRANULOCYTES # BLD AUTO: 0.2 K/UL (ref 0–0.04)
IMM GRANULOCYTES NFR BLD AUTO: 2 % (ref 0–0.5)
INR PPP: 1.1 (ref 0.8–1.2)
LYMPHOCYTES # BLD: 1.7 K/UL (ref 0.9–3.6)
LYMPHOCYTES NFR BLD: 15 % (ref 21–52)
MCH RBC QN AUTO: 30.6 PG (ref 24–34)
MCHC RBC AUTO-ENTMCNC: 33.3 G/DL (ref 31–37)
MCV RBC AUTO: 91.9 FL (ref 78–100)
MONOCYTES # BLD: 1 K/UL (ref 0.05–1.2)
MONOCYTES NFR BLD: 9 % (ref 3–10)
NEUTS SEG # BLD: 8 K/UL (ref 1.8–8)
NEUTS SEG NFR BLD: 72 % (ref 40–73)
NRBC # BLD: 0 K/UL (ref 0–0.01)
NRBC BLD-RTO: 0 PER 100 WBC
PLATELET # BLD AUTO: 512 K/UL (ref 135–420)
PMV BLD AUTO: 10.1 FL (ref 9.2–11.8)
POTASSIUM SERPL-SCNC: 3.4 MMOL/L (ref 3.5–5.5)
PROTHROMBIN TIME: 15 SEC (ref 11.5–15.2)
RBC # BLD AUTO: 4.05 M/UL (ref 4.2–5.3)
SERVICE CMNT-IMP: NORMAL
SODIUM SERPL-SCNC: 137 MMOL/L (ref 136–145)
WBC # BLD AUTO: 11.2 K/UL (ref 4.6–13.2)

## 2023-03-10 PROCEDURE — 07923ZX DRAINAGE OF LEFT NECK LYMPHATIC, PERCUTANEOUS APPROACH, DIAGNOSTIC: ICD-10-PCS | Performed by: SURGERY

## 2023-03-10 PROCEDURE — 99232 SBSQ HOSP IP/OBS MODERATE 35: CPT | Performed by: INTERNAL MEDICINE

## 2023-03-10 PROCEDURE — 97530 THERAPEUTIC ACTIVITIES: CPT

## 2023-03-10 PROCEDURE — 6370000000 HC RX 637 (ALT 250 FOR IP): Performed by: HOSPITALIST

## 2023-03-10 PROCEDURE — 1100000000 HC RM PRIVATE

## 2023-03-10 PROCEDURE — 76942 ECHO GUIDE FOR BIOPSY: CPT

## 2023-03-10 PROCEDURE — 94761 N-INVAS EAR/PLS OXIMETRY MLT: CPT

## 2023-03-10 PROCEDURE — 80048 BASIC METABOLIC PNL TOTAL CA: CPT

## 2023-03-10 PROCEDURE — 6360000002 HC RX W HCPCS: Performed by: SURGERY

## 2023-03-10 PROCEDURE — 36415 COLL VENOUS BLD VENIPUNCTURE: CPT

## 2023-03-10 PROCEDURE — 88172 CYTP DX EVAL FNA 1ST EA SITE: CPT

## 2023-03-10 PROCEDURE — 85025 COMPLETE CBC W/AUTO DIFF WBC: CPT

## 2023-03-10 PROCEDURE — 2500000003 HC RX 250 WO HCPCS: Performed by: SURGERY

## 2023-03-10 PROCEDURE — 6360000002 HC RX W HCPCS: Performed by: HOSPITALIST

## 2023-03-10 PROCEDURE — 2580000003 HC RX 258: Performed by: INTERNAL MEDICINE

## 2023-03-10 PROCEDURE — 6370000000 HC RX 637 (ALT 250 FOR IP): Performed by: INTERNAL MEDICINE

## 2023-03-10 PROCEDURE — 85610 PROTHROMBIN TIME: CPT

## 2023-03-10 PROCEDURE — 88305 TISSUE EXAM BY PATHOLOGIST: CPT

## 2023-03-10 PROCEDURE — 2580000003 HC RX 258: Performed by: HOSPITALIST

## 2023-03-10 PROCEDURE — 88173 CYTOPATH EVAL FNA REPORT: CPT

## 2023-03-10 RX ORDER — MIDAZOLAM HYDROCHLORIDE 2 MG/2ML
INJECTION, SOLUTION INTRAMUSCULAR; INTRAVENOUS
Status: COMPLETED | OUTPATIENT
Start: 2023-03-10 | End: 2023-03-10

## 2023-03-10 RX ORDER — LIDOCAINE HYDROCHLORIDE 10 MG/ML
INJECTION, SOLUTION EPIDURAL; INFILTRATION; INTRACAUDAL; PERINEURAL
Status: COMPLETED | OUTPATIENT
Start: 2023-03-10 | End: 2023-03-10

## 2023-03-10 RX ORDER — FENTANYL CITRATE 50 UG/ML
INJECTION, SOLUTION INTRAMUSCULAR; INTRAVENOUS
Status: COMPLETED | OUTPATIENT
Start: 2023-03-10 | End: 2023-03-10

## 2023-03-10 RX ADMIN — MIDAZOLAM HYDROCHLORIDE 1 MG: 1 INJECTION, SOLUTION INTRAMUSCULAR; INTRAVENOUS at 10:31

## 2023-03-10 RX ADMIN — LIDOCAINE HYDROCHLORIDE 10 ML: 10 INJECTION, SOLUTION EPIDURAL; INFILTRATION; INTRACAUDAL; PERINEURAL at 10:27

## 2023-03-10 RX ADMIN — PROPRANOLOL HYDROCHLORIDE 20 MG: 10 TABLET ORAL at 14:06

## 2023-03-10 RX ADMIN — FENTANYL CITRATE 50 MCG: 50 INJECTION, SOLUTION INTRAMUSCULAR; INTRAVENOUS at 10:27

## 2023-03-10 RX ADMIN — AMLODIPINE BESYLATE 5 MG: 5 TABLET ORAL at 14:06

## 2023-03-10 RX ADMIN — SODIUM CHLORIDE, PRESERVATIVE FREE 10 ML: 5 INJECTION INTRAVENOUS at 20:50

## 2023-03-10 RX ADMIN — VANCOMYCIN HYDROCHLORIDE 125 MG: 1 INJECTION, POWDER, LYOPHILIZED, FOR SOLUTION INTRAVENOUS at 00:32

## 2023-03-10 RX ADMIN — VANCOMYCIN HYDROCHLORIDE 125 MG: 1 INJECTION, POWDER, LYOPHILIZED, FOR SOLUTION INTRAVENOUS at 20:45

## 2023-03-10 RX ADMIN — VANCOMYCIN HYDROCHLORIDE 125 MG: 1 INJECTION, POWDER, LYOPHILIZED, FOR SOLUTION INTRAVENOUS at 14:05

## 2023-03-10 RX ADMIN — FAMOTIDINE 20 MG: 20 TABLET ORAL at 20:44

## 2023-03-10 RX ADMIN — SERTRALINE 150 MG: 50 TABLET, FILM COATED ORAL at 14:05

## 2023-03-10 RX ADMIN — BUSPIRONE HYDROCHLORIDE 10 MG: 5 TABLET ORAL at 14:05

## 2023-03-10 RX ADMIN — ASPIRIN 81 MG CHEWABLE TABLET 81 MG: 81 TABLET CHEWABLE at 14:04

## 2023-03-10 RX ADMIN — MIDAZOLAM HYDROCHLORIDE 1 MG: 1 INJECTION, SOLUTION INTRAMUSCULAR; INTRAVENOUS at 10:27

## 2023-03-10 RX ADMIN — FAMOTIDINE 20 MG: 20 TABLET ORAL at 14:05

## 2023-03-10 RX ADMIN — SODIUM CHLORIDE, PRESERVATIVE FREE 10 ML: 5 INJECTION INTRAVENOUS at 09:32

## 2023-03-10 RX ADMIN — FENTANYL CITRATE 50 MCG: 50 INJECTION, SOLUTION INTRAMUSCULAR; INTRAVENOUS at 10:31

## 2023-03-10 RX ADMIN — BUSPIRONE HYDROCHLORIDE 10 MG: 5 TABLET ORAL at 20:44

## 2023-03-10 RX ADMIN — PROPRANOLOL HYDROCHLORIDE 20 MG: 10 TABLET ORAL at 20:49

## 2023-03-10 RX ADMIN — VANCOMYCIN HYDROCHLORIDE 125 MG: 1 INJECTION, POWDER, LYOPHILIZED, FOR SOLUTION INTRAVENOUS at 06:06

## 2023-03-10 ASSESSMENT — PAIN SCALES - GENERAL: PAINLEVEL_OUTOF10: 0

## 2023-03-10 NOTE — CARE COORDINATION
CM received call from [facility] ([facility]) stating patient is able to be admitted to facility on 3/10/2023. [facility] ([facility])  is on call for facility admissions: [phone]. CM notified patient she will be transported to [address] [place] [address] once discharge orders are available. CM explained a second time admission is for long term care and facility provides medical care, meals, assistance with ADL/IADL. CM educated patient on lack of support and risk with discharge home. Patient verbalized understanding and is agreement to go to facility. Patient is prepared for discharge. Discharge to LTC facility once discharge orders are received. Please schedule transportation.          SAVANNAH Guzmán  Care Management

## 2023-03-10 NOTE — CARE COORDINATION
CM met with patient and mother at bedside. Patient is in agreement she requires a higher level of care and [address] is appropriate for discharge planning. CM reviewed accepted facilities from [place]. [facility], [facility]. Patient is in agreement to be discharged to [address] [place] [address]. LTSS/UAI was [facility] to [facility]. CM contacted Gabrielle Birmingham with [address] [place] [address] to verify a weekend admission is acceptable. [facility] will take patient over weekend if discharged. [facility] will also confirm if patient has to complete antibiotics for C-diff prior to admission and return CM call.          SAVANNAH Guzmán  Care Management

## 2023-03-10 NOTE — CARE COORDINATION
WAQAR contacted Charmaine Canary of [facility]. Patient will possibly be discharged on 3/11/2023 to [facility]. Charmaine Jimenez is still waiting on confirmation from Director of Nursing that patient is able to be accepted with C-diff & will return call to CM. CM will follow up before end of day today.        Shea Reilly, MSW  Care Management

## 2023-03-10 NOTE — PLAN OF CARE
Problem: Physical Therapy - Adult  Goal: By Discharge: Performs mobility at highest level of function for planned discharge setting. See evaluation for individualized goals. Description: Physical Therapy Goals  Initiated 3/7/2023 and to be accomplished within 7 day(s)  1. Patient will move from supine to sit and sit to supine in bed with supervision/set-up. 2.  Patient will transfer from bed to chair and chair to bed with supervision/set-up using the least restrictive device. 3.  Patient will perform sit to stand with supervision/set-up. 4.  Patient will ambulate with supervision/set-up for 50 feet with the least restrictive device. 5.  Patient will ascend/descend 8 stairs with handrail(s) with minimal assistance/contact guard assist.    PLOF: Independent. Lives with mother. Two story home; bedroom on second floor. Outcome: Progressing   PHYSICAL THERAPY TREATMENT    Patient: Crow Villegas (18 y.o. female)  Date: 3/10/2023  Diagnosis: Septicemia ([facility] [address] [A41.9]  Cervical adenopathy [R59.0]  Elevated troponin [R77.8]  Acute cystitis without hematuria [N30.00]  Sepsis ([address] [A41.9] Sepsis ([address]  Precautions: Fall Risk  ASSESSMENT:  Agreeable to PT. Voices agitation with skilled PT tasks and education. Educated on benefits of EOB/OOB for meals and toileting. Educated on role of PT. Supervision for supine to sit. Supervision for sitting balance. Reports dizziness in sitting; lessens with prolonged time in sitting. HR 98. Supervision for sit to stand to change soiled pad; max encouragement to participate in transfer.  post standing. Returned to supine at patient request. /87. Seated in bed with HOB elevated. Call bell in reach.      Progression toward goals:   []      Improving appropriately and progressing toward goals  []      Improving slowly and progressing toward goals  [x]      Not making progress toward goals     PLAN:  Patient continues to benefit from skilled intervention to address the above impairments. Continue treatment per established plan of care. Further Equipment Recommendations for Discharge: TBD with amb    Discharge Recommendations: Subacute/Skilled Nursing Facility    AMPA: 15/24  This [facility] score should be considered in conjunction with interdisciplinary team recommendations to determine the most appropriate discharge setting. Patient's social support, diagnosis, medical stability, and prior level of function should also be taken into consideration. Current research shows that an AM-PAC score of 17 (13 without stairs) or less is not associated with a discharge to the patient's home setting. Based on an AM-PAC score of 15/24 and their current functional mobility deficits, it is recommended that the patient have 3-5 sessions per week of Physical Therapy at d/c to increase the patient's independence. SUBJECTIVE:   Patient stated, \"I can see what you are doing. \"    OBJECTIVE DATA SUMMARY:   Critical Behavior:  Orientation  Orientation Level: Oriented to person  Cognition  Overall Cognitive Status: Exceptions  Following Commands:  Follows all commands without difficulty  Attention Span: Appears intact  Safety Judgement: Decreased awareness of need for safety  Insights: Decreased awareness of deficits    Functional Mobility Training:  Bed Mobility:  Bed Mobility Training  Supine to Sit: Supervision  Sit to Supine: Supervision  Transfers:  Transfer Training  Transfer Training: Yes  Sit to Stand: Supervision  Stand to Sit: Supervision  Balance:  Balance  Sitting: Impaired  Sitting - Static: Good (unsupported)  Sitting - Dynamic: Good (unsupported)  Standing: Impaired  Standing - Static: Fair  Neuro Re-Education:  Seated EOB 3 minutes  Therapeutic Exercises:   Seated EOB BLE AROM hip flexion, knee extension, ankle pumps x10    Pain:  Pain level pre-treatment: 0/10  Pain level post-treatment: 0/10     Activity Tolerance:   Activity Tolerance: Patient tolerated treatment well    After treatment:   [] Patient left in no apparent distress sitting up in chair  [x] Patient left in no apparent distress in bed  [x] Call bell left within reach  [] Nursing notified  [] Caregiver present  [] Bed alarm activated  [] SCDs applied      COMMUNICATION/EDUCATION:   Patient Education  Education Given To: Patient  Education Provided: Role of Therapy; Energy Conservation; Fall Prevention Strategies; Plan of Care;Transfer Training  Education Method: Demonstration;Verbal;Teach Back  Barriers to Learning: Cognition  Education Outcome: Verbalized understanding;Demonstrated understanding;Continued education needed      [employer], PT  Minutes: [address]  AM-PAC® Basic Mobility Inpatient Short Form (6-Clicks) Version 2    How much HELP from another person does the patient currently need    (If the patient hasn't done an activity recently, how much help from another person do you think he/she would need if he/she tried?)   Total (Total A or Dep)   A Lot  (Mod to Max A)   A Little (Sup or Min A)   None (Mod I to I)   Turning from your back to your side while in a flat bed without using bedrails? [] 1 [] 2 [x] 3 [] 4   2. Moving from lying on your back to sitting on the side of a flat bed without using bedrails? [] 1 [] 2 [x] 3 [] 4   3. Moving to and from a bed to a chair (including a wheelchair)? [] 1 [x] 2 [] 3 [] 4   4. Standing up from a chair using your arms (e.g., wheelchair, or bedside chair)? [] 1 [] 2 [x] 3 [] 4   5. Walking in hospital room? [] 1 [x] 2 [] 3 [] 4   6. Climbing 3-5 steps with a railing?+   [] 1 [x] 2 [] 3 [] 4   +If stair climbing cannot be assessed, skip item #6. Sum responses from items 1-5.

## 2023-03-10 NOTE — PROGRESS NOTES
Infectious Disease progress Note        Reason: Gram-positive bloodstream infection, sepsis, cystitis    Current abx Prior abx   Ceftriaxone since 3/6  Vancomycin since 3/6      Lines:       Assessment :   60 y.o. female with medical co-morbidities including Depression, Fibromyalgia, Rheumatoid arthritis, tobacco smoking dependence, presented to [facility] from home on 3/6/23 with recurrent falls for  3 days. Now with leukocytosis, significant pyuria, hematuria, elevated lactate, tachycardia on presentation, positive blood culture    Exact etiology of worsening leukocytosis not entirely clear. Differential diagnosis includes partially treated sepsis due to partially treated urinary tract infection/evolving C. difficile colitis    Positive stool c.diff pcr on 3/8/23  Repeat urine cx 3/8: pending    Tremulousness, necrotic lymph node in neck: concerning for malignancy    1 of 2 sets of blood cultures 3/6 positive for coagulase-negative Staphylococcus: could be  contaminant versus bloodstream infection secondary to urinary tract infection. Discussed with microbiology lab. Urine culture 3/6-> 100,000 colonies of multiple types of coagulase-negative Staphylococcus, diphtheroids -likely contaminated by skin kellie    Hypoxia: likely due to volume overload, COPD    Clinically better     Recommendations:    Continue ceftriaxone. Recommend oral vancomycin 125 mg p.o. every 6 hours for C. difficile infection . D/c vancomycin   Follow-up repeat blood cultures sent today. Follow-up identification of gram-positive cocci in blood culture 3/6  Follow up Repeat urine culture 3/8  Diuresis per primary team  Follow-up neurology recommendations  6. Monitor CBC, about test results to determine definitive antibiotic type/duration     Above plan was discussed in details with patient, RN and dr Carter Lundberg. Please call me if any further questions or concerns. Will continue to participate in the care of this patient.   HPI:     Patient denies any fever, chills. She denies increasing chest pain, shortness of breath, decreased appetite, weight loss.   Denies severe abdominal pain, diarrhea      Past Medical History:   Diagnosis Date    Arrhythmia     palpitations  heart murmur    Depression     Headache(784.0)     UTI (urinary tract infection)        Past Surgical History:   Procedure Laterality Date    APPENDECTOMY      GYN  1989    partial hysterectomy     ORTHOPEDIC SURGERY  2007    lumbar fusion     TONSILLECTOMY         [unfilled]    Current Facility-Administered Medications   Medication Dose Route Frequency    vancomycin (VANCOCIN) oral solution 125 mg  125 mg Oral 4 times per day    amLODIPine (NORVASC) tablet 5 mg  5 mg Oral Daily    cefTRIAXone (ROCEPHIN) 1,000 mg in sterile water 10 mL IV syringe  1,000 mg IntraVENous Q24H    propranolol (INDERAL) tablet 20 mg  20 mg Oral BID    sodium chloride flush 0.9 % injection 5-40 mL  5-40 mL IntraVENous 2 times per day    sodium chloride flush 0.9 % injection 5-40 mL  5-40 mL IntraVENous PRN    0.9 % sodium chloride infusion   IntraVENous PRN    enoxaparin (LOVENOX) injection 40 mg  40 mg SubCUTAneous Daily    ondansetron (ZOFRAN-ODT) disintegrating tablet 4 mg  4 mg Oral Q8H PRN    Or    ondansetron (ZOFRAN) injection 4 mg  4 mg IntraVENous Q6H PRN    polyethylene glycol (GLYCOLAX) packet 17 g  17 g Oral Daily PRN    acetaminophen (TYLENOL) tablet 650 mg  650 mg Oral Q6H PRN    Or    acetaminophen (TYLENOL) suppository 650 mg  650 mg Rectal Q6H PRN    famotidine (PEPCID) tablet 20 mg  20 mg Oral BID    aspirin chewable tablet 81 mg  81 mg Oral Daily    therapeutic multivitamin-minerals 1 tablet  1 tablet Oral Daily    busPIRone (BUSPAR) tablet 10 mg  10 mg Oral BID    sertraline (ZOLOFT) tablet 150 mg  150 mg Oral Daily       Allergies: Latex and Codeine    Family History   Problem Relation Age of Onset    Cancer Other         breast    Hypertension Father     Coronary Art Dis Other grandmother    Diabetes Mother     Hypertension Mother     Diabetes Father      Social History     Socioeconomic History    Marital status:      Spouse name: Not on file    Number of children: Not on file    Years of education: Not on file    Highest education level: Not on file   Occupational History    Not on file   Tobacco Use    Smoking status: Every Day     Packs/day: 0.50     Types: Cigarettes    Smokeless tobacco: Never   Substance and Sexual Activity    Alcohol use: No    Drug use: No    Sexual activity: Not on file     Comment: Hysterectomy   Other Topics Concern    Not on file   Social History Narrative    Not on file     Social Determinants of Health     Financial Resource Strain: Not on file   Food Insecurity: Not on file   Transportation Needs: Not on file   Physical Activity: Not on file   Stress: Not on file   Social Connections: Not on file   Intimate Partner Violence: Not on file   Housing Stability: Not on file     Social History     Tobacco Use   Smoking Status Every Day    Packs/day: 0.50    Types: Cigarettes   Smokeless Tobacco Never        Temp (24hrs), Av.3 °F (36.8 °C), Min:97.8 °F (36.6 °C), Max:98.5 °F (36.9 °C)    BP (!) 159/75   Pulse 81   Temp 98.5 °F (36.9 °C) (Oral)   Resp 18   Ht 5' 4\" (1.626 m)   Wt 140 lb 10.5 oz (63.8 kg)   SpO2 91%   BMI 24.14 kg/m²     ROS: 12 point ROS obtained in details. Pertinent positives as mentioned in HPI,   otherwise negative    Physical Exam:    General:  Cooperative, Not in acute distress, speaks in full sentence while in bed, appears weak  HEENT: EOMI, supple neck, no JVD, dry oral mucosa  Cardiovascular: S1S2 rrr, no rub/gallop   Pulmonary: + air entry bilaterally, no wheezing, no crackle, decreased breath sounds bilaterally  GI:  Soft, +LLQ tenderness, non distended, +bs, no guarding/rigidity  Back: left cva tenderness  Extremities:  No pedal edema  Neuro: AOx3, moving all extremities, no gross deficit.     Skin: no rash/ulcers noted    Labs: Results:   Chemistry Recent Labs     03/07/23  0351 03/07/23  1659 03/08/23  0258 03/09/23  0114   GLUCOSE 83  --  102* 85     --  140 136   K 2.6* 3.7 3.3* 2.8*     --  110 104   CO2 25  --  23 21   BUN 10  --  9 10   CREATININE 0.35*  --  0.32* 0.30*        CBC w/Diff Recent Labs     03/07/23  0351 03/08/23  0258 03/09/23  0114   WBC 11.7 15.2* 14.9*   RBC 4.01* 3.92* 3.93*   HGB 12.2 12.1 12.2   HCT 36.0 36.4 36.6   * 490* 510*        Microbiology Results       Procedure Component Value Units Date/Time    Culture, Urine [[id number]] Collected: 03/08/23 1435    Order Status: Sent Specimen: Urine, clean catch Updated: 03/09/23 0642    Enteric Bact Panel, DNA [[id number]] Collected: 03/08/23 1045    Order Status: Sent Specimen: Stool Updated: 03/08/23 1107    Clostridium difficile toxin/antigen [[id number]]  (Abnormal) Collected: 03/08/23 1045    Order Status: Completed Specimen: Stool from Miscellaneous sample Updated: 03/08/23 1330     Reflex       See Reflex order for C. difficile (DNA)           C Diff Toxin Interpretation       Indeterminate for Toxigenic C. difficile, DNA confirmation to follow. C difficile Molecular/PCR [[id number]]  (Abnormal) Collected: 03/08/23 1045    Order Status: Completed Specimen: Miscellaneous sample Updated: 03/08/23 1330     C. difficile toxin Molecular Positive        Comment: This specimen is positive for toxigenic C difficile by DNA amplification. Repeat testing is not recommended, samples received within 7 days of this positive result will be rejected. Assay is not approved to test for cure, since nucleic acids may persist after effective treatment and may prompt false positive results.   CALLED TO AND CORRECTLY REPEATED BY:  MARY VALENTINE,4N,AT 1329 TO 1200 [facility] ON 3/8/23         Culture, Blood 1 [[id number]] Collected: 03/08/23 0604    Order Status: Completed Specimen: Blood Updated: 03/09/23 0701     Special Requests --        RIGHT  HAND Culture NO GROWTH 1 DAY       Blood Culture 1 [[id number]] Collected: 03/06/23 1630    Order Status: Canceled Specimen: Blood     Culture, Urine [[id number]] Collected: 03/06/23 1622    Order Status: Completed Specimen: Urine, clean catch Updated: 03/08/23 0846     Special Requests NO SPECIAL REQUESTS        [place] count --        >100,000  COLONIES/mL       Culture       MIXED UROGENITAL LORI ISOLATED          COVID-19 & Influenza Combo [[id number]] Collected: 03/06/23 1530    Order Status: Completed Specimen: Nasopharyngeal Updated: 03/06/23 1606     SARS-CoV-2, PCR Not detected        Comment: Not Detected results do not preclude SARS-CoV-2 infection and should not be used as the sole basis for patient management decisions. Results must be combined with clinical observations, patient history, and epidemiological information. Rapid Influenza A By PCR Not detected        Rapid Influenza B By PCR Not detected        Comment: Testing was performed using a[employer] SARS-CoV-2 and Influenza A/B nucleic acid assay. This test is a multiplex Real-Time Reverse Transcriptase Polymerase Chain Reaction (RT-PCR) based in vitro diagnostic test intended for the qualitative detection of nucleic acids from SARS-CoV-2, Influenza A, and Influenza B in nasopharyngeal for use under the FDA's Emergency Use Authorization(EAU) only. Fact sheet for Patients: Xytis.cy  Fact sheet for Healthcare Providers: Xytis.cy         Culture, Blood 1 [[id number]]  (Abnormal) Collected: 03/06/23 1530    Order Status: Completed Specimen: Blood Updated: 03/08/23 1527     Special Requests LAC     Gram stain       AEROBIC BOTTLE Gram positive cocci IN GROUPS                  SMEAR CALLED TO AND CORRECTLY REPEATED BY: MARY CERDA ON [id number] AT 3604 HRS TO 1396.            Culture       STAPHYLOCOCCUS SPECIES, COAGULASE NEGATIVE GROWING IN 1 OF 2 BOTTLES DRAWN SITE = LAC Culture, Blood 2 [[id number]] Collected: 03/06/23 1530    Order Status: Completed Specimen: Blood Updated: 03/09/23 0701     Special Requests L ARM     Culture NO GROWTH 3 DAYS       Culture, Blood, PCR ID Panel [[id number]]  (Abnormal) Collected: 03/06/23 1530    Order Status: Completed Specimen: Blood Updated: 03/08/23 0200     Accession Number [id number]     Enterococcus faecalis by PCR Not detected        Enterococcus faecium by PCR Not detected        Listeria monocytogenes by PCR Not detected        STAPHYLOCOCCUS Detected        Staphylococcus Aureus Not detected        Staphylococcus epidermidis by PCR Detected        Staphylococcus lugdunensis by PCR Not detected        STREPTOCOCCUS Not detected        Streptococcus agalactiae (Group B) Not detected        Strep pneumoniae Not detected        Strep pyogenes,(Grp. A) Not detected        Acinetobacter calcoac baumannii complex by PCR Not detected        Bacteroides fragilis by PCR Not detected        Enterobacteriaceae by PCR Not detected        Enterobacter cloacae complex by PCR Not detected        Escherichia Coli Not detected        Klebsiella aerogenes by PCR Not detected        Klebsiella oxytoca by PCR Not detected        Klebsiella pneumoniae group by PCR Not detected        Proteus by PCR Not detected        Salmonella species by PCR Not detected        Serratia marcescens by PCR Not detected        Haemophilus Influenzae by PCR Not detected        Neisseria meningitidis by PCR Not detected        Pseudomonas aeruginosa Not detected        Stenotrophomonas maltophilia by PCR Not detected        Candida albicans by PCR Not detected        Candida auris by PCR Not detected        Candida glabrata Not detected        Candida krusei by PCR Not detected        Candida parapsilosis by PCR Not detected        Candida tropicalis by PCR Not detected        Cryptococcus neoformans/gattii by PCR Not detected        Resistant gene targets Methicillin Resistance mecA/C  by PCR Not detected        [employer] test comment       False positive results may rarely occur. Correlate with clinical,epidemiologic, and other laboratory findings           Comment: Please see BCID Interpretation Guide in EPIC Links                   RADIOLOGY:    All available imaging studies/reports in [facility] for this admission were reviewed    Total time spent >35 minutes. High complexity decision making was performed during the evaluation of this patient at high risk for decompensation with multiple organ involvement     Above mentioned total time spent on reviewing the case/medical record/data/notes/EMR/patient examination/documentation/coordinating care with nurse/consultants, exclusive of procedures with complex decision making performed and > 50% time spent in face to face evaluation.     Disclaimer: Sections of this note are dictated utilizing voice recognition software, which may have resulted in some phonetic based errors in grammar and contents. Even though attempts were made to correct all the mistakes, some may have been missed, and remained in the body of the document. If questions arise, please contact our department.    Dr. Yuliya Rangel, Infectious Disease Specialist  [phone]  March 9, 2023  9:43 PM

## 2023-03-10 NOTE — PROGRESS NOTES
Infectious Disease progress Note        Reason: Gram-positive bloodstream infection, sepsis, cystitis    Current abx Prior abx   Ceftriaxone since 3/6  Vancomycin since 3/6      Lines:       Assessment :   60 y.o. female with medical co-morbidities including Depression, Fibromyalgia, Rheumatoid arthritis, tobacco smoking dependence, presented to [facility] from home on 3/6/23 with recurrent falls for  3 days. Now with leukocytosis, significant pyuria, hematuria, elevated lactate, tachycardia on presentation, positive blood culture    Clinical presentation consistent with partially treated sepsis due to partially treated urinary tract infection/evolving C. difficile colitis    Positive stool c.diff pcr on 3/8/23  Repeat urine cx 3/8: No growth    Tremulousness, necrotic lymph node in neck: concerning for malignancy    1 of 2 sets of blood cultures 3/6 positive for coagulase-negative Staphylococcus: Likely contaminant. Negative repeat blood culture    . Urine culture 3/6-> 100,000 colonies of multiple types of coagulase-negative Staphylococcus, diphtheroids -likely contaminated by skin kellie    Hypoxia: likely due to volume overload, COPD. improved    Clinically better. Improving diarrhea. Improved leukocytosis    Recommendations:    Discontinue ceftriaxone since completed adequate therapy for uncomplicated cystitis. Will prefer to use shorter course of broad-spectrum antibiotics due to concurrent C. Difficile-risk of worsening with ongoing antibiotics. Recommend oral vancomycin 125 mg p.o. every 6 hours till 3/19/2023 for C. difficile infection  Diuresis per primary team  Follow-up neurology recommendations  4. Discharge planning per primary team     Above plan was discussed in details with patient, RN and dr Eduardo Cabrera. Please call me if any further questions or concerns. Will continue to participate in the care of this patient. HPI:     Patient denies any fever, chills. Had 3 bowel movement yesterday.   No bowel movement overnight she denies increasing chest pain, shortness of breath, decreased appetite, weight loss. Denies severe abdominal pain. States that she is too weak to go home.   Wishes to go to skilled nursing facility      Past Medical History:   Diagnosis Date    Arrhythmia     palpitations  heart murmur    Depression     Headache(784.0)     UTI (urinary tract infection)        Past Surgical History:   Procedure Laterality Date    APPENDECTOMY      GYN  1989    partial hysterectomy     ORTHOPEDIC SURGERY  2007    lumbar fusion     TONSILLECTOMY         @[email or web address]@    Current Facility-Administered Medications   Medication Dose Route Frequency    vancomycin (VANCOCIN) oral solution 125 mg  125 mg Oral 4 times per day    amLODIPine (NORVASC) tablet 5 mg  5 mg Oral Daily    cefTRIAXone (ROCEPHIN) 1,000 mg in sterile water 10 mL IV syringe  1,000 mg IntraVENous Q24H    propranolol (INDERAL) tablet 20 mg  20 mg Oral BID    sodium chloride flush 0.9 % injection 5-40 mL  5-40 mL IntraVENous 2 times per day    sodium chloride flush 0.9 % injection 5-40 mL  5-40 mL IntraVENous PRN    0.9 % sodium chloride infusion   IntraVENous PRN    enoxaparin (LOVENOX) injection 40 mg  40 mg SubCUTAneous Daily    ondansetron (ZOFRAN-ODT) disintegrating tablet 4 mg  4 mg Oral Q8H PRN    Or    ondansetron (ZOFRAN) injection 4 mg  4 mg IntraVENous Q6H PRN    polyethylene glycol (GLYCOLAX) packet 17 g  17 g Oral Daily PRN    acetaminophen (TYLENOL) tablet 650 mg  650 mg Oral Q6H PRN    Or    acetaminophen (TYLENOL) suppository 650 mg  650 mg Rectal Q6H PRN    famotidine (PEPCID) tablet 20 mg  20 mg Oral BID    aspirin chewable tablet 81 mg  81 mg Oral Daily    therapeutic multivitamin-minerals 1 tablet  1 tablet Oral Daily    busPIRone (BUSPAR) tablet 10 mg  10 mg Oral BID    sertraline (ZOLOFT) tablet 150 mg  150 mg Oral Daily       Allergies: Latex and Codeine    Family History   Problem Relation Age of Onset    Cancer Other breast    Hypertension Father     Coronary Art Dis Other         grandmother    Diabetes Mother     Hypertension Mother     Diabetes Father      Social History     Socioeconomic History    Marital status:      Spouse name: Not on file    Number of children: Not on file    Years of education: Not on file    Highest education level: Not on file   Occupational History    Not on file   Tobacco Use    Smoking status: Every Day     Packs/day: 0.50     Types: Cigarettes    Smokeless tobacco: Never   Substance and Sexual Activity    Alcohol use: No    Drug use: No    Sexual activity: Not on file     Comment: Hysterectomy   Other Topics Concern    Not on file   Social History Narrative    Not on file     Social Determinants of Health     Financial Resource Strain: Not on file   Food Insecurity: Not on file   Transportation Needs: Not on file   Physical Activity: Not on file   Stress: Not on file   Social Connections: Not on file   Intimate Partner Violence: Not on file   Housing Stability: Not on file     Social History     Tobacco Use   Smoking Status Every Day    Packs/day: 0.50    Types: Cigarettes   Smokeless Tobacco Never        Temp (24hrs), Av.2 °F (36.8 °C), Min:97.8 °F (36.6 °C), Max:98.5 °F (36.9 °C)    BP (!) 176/90   Pulse 85   Temp 98.3 °F (36.8 °C) (Oral)   Resp 18   Ht 5' 4\" (1.626 m)   Wt 140 lb 10.5 oz (63.8 kg)   SpO2 94%   BMI 24.14 kg/m²     ROS: 12 point ROS obtained in details.  Pertinent positives as mentioned in HPI,   otherwise negative    Physical Exam:    General:  Cooperative, Not in acute distress, speaks in full sentence while in bed, appears weak  HEENT: EOMI, supple neck, no JVD, dry oral mucosa  Cardiovascular: S1S2 rrr, no rub/gallop   Pulmonary: + air entry bilaterally, no wheezing, no crackle, decreased breath sounds bilaterally  GI:  Soft, resolved LLQ tenderness, non distended, +bs, no guarding/rigidity  Back: Resolved left cva tenderness  Extremities:  No pedal edema  Neuro: AOx3, moving all extremities, no gross deficit. Skin: no rash/ulcers noted    Labs: Results:   Chemistry Recent Labs     03/08/23 0258 03/09/23  0114 03/10/23  0409   GLUCOSE 102* 85 99    136 137   K 3.3* 2.8* 3.4*    104 106   CO2 23 21 26   BUN 9 10 13   CREATININE 0.32* 0.30* 0.32*        CBC w/Diff Recent Labs     03/08/23 0258 03/09/23  0114 03/10/23  0409   WBC 15.2* 14.9* 11.2   RBC 3.92* 3.93* 4.05*   HGB 12.1 12.2 12.4   HCT 36.4 36.6 37.2   * 510* 512*        Microbiology Results       Procedure Component Value Units Date/Time    Culture, Urine [[id number]] Collected: 03/08/23 1435    Order Status: Completed Specimen: Urine, clean catch Updated: 03/10/23 0855     Special Requests NO SPECIAL REQUESTS        Culture No growth (<1,000 CFU/ML)       Enteric Bact Panel, DNA [[id number]] Collected: 03/08/23 1045    Order Status: Completed Specimen: Stool Updated: 03/09/23 2216     SHIGELLA SPECIES, DNA Negative        CAMPYLOBACTER SPECIES, DNA Negative        VIBRIO SPECIES, DNA Negative        ENTEROTOXIGEN E COLI, DNA Negative        SHIGA TOXIN PRODUCING, DNA Negative        SALMONELLA SPECIES, DNA Negative        P. SHIGELLOIDES, DNA Negative        Y. ENTEROCOLITICA, DNA Negative       Clostridium difficile toxin/antigen [[id number]]  (Abnormal) Collected: 03/08/23 1045    Order Status: Completed Specimen: Stool from Miscellaneous sample Updated: 03/08/23 1330     Reflex       See Reflex order for C. difficile (DNA)           C Diff Toxin Interpretation       Indeterminate for Toxigenic C. difficile, DNA confirmation to follow. C difficile Molecular/PCR [[id number]]  (Abnormal) Collected: 03/08/23 1045    Order Status: Completed Specimen: Miscellaneous sample Updated: 03/08/23 1330     C. difficile toxin Molecular Positive        Comment: This specimen is positive for toxigenic C difficile by DNA amplification.   Repeat testing is not recommended, samples received within 7 days of this positive result will be rejected. Assay is not approved to test for cure, since nucleic acids may persist after effective treatment and may prompt false positive results. CALLED TO AND CORRECTLY REPEATED BY:  MARY VALENTINE,4N,AT 1329 TO 1200 [facility] ON 3/8/23         Culture, Blood 1 [[id number]] Collected: 03/08/23 0604    Order Status: Completed Specimen: Blood Updated: 03/10/23 0655     Special Requests --        RIGHT  HAND       Culture NO GROWTH 2 DAYS       Blood Culture 1 [[id number]] Collected: 03/06/23 1630    Order Status: Canceled Specimen: Blood     Culture, Urine [[id number]] Collected: 03/06/23 1622    Order Status: Completed Specimen: Urine, clean catch Updated: 03/08/23 0846     Special Requests NO SPECIAL REQUESTS        [place] count --        >100,000  COLONIES/mL       Culture       MIXED UROGENITAL LORI ISOLATED          COVID-19 & Influenza Combo [[id number]] Collected: 03/06/23 1530    Order Status: Completed Specimen: Nasopharyngeal Updated: 03/06/23 1606     SARS-CoV-2, PCR Not detected        Comment: Not Detected results do not preclude SARS-CoV-2 infection and should not be used as the sole basis for patient management decisions. Results must be combined with clinical observations, patient history, and epidemiological information. Rapid Influenza A By PCR Not detected        Rapid Influenza B By PCR Not detected        Comment: Testing was performed using alex Dulce SARS-CoV-2 and Influenza A/B nucleic acid assay. This test is a multiplex Real-Time Reverse Transcriptase Polymerase Chain Reaction (RT-PCR) based in vitro diagnostic test intended for the qualitative detection of nucleic acids from SARS-CoV-2, Influenza A, and Influenza B in nasopharyngeal for use under the FDA's Emergency Use Authorization(EAU) only.        Fact sheet for Patients: FindDrives.pl  Fact sheet for Healthcare Providers: MysteryVoices.com.GoWorkaBit         Culture, Blood 1 [[id number]]  (Abnormal) Collected: 03/06/23 1530    Order Status: Completed Specimen: Blood Updated: 03/08/23 1527     Special Requests LAC     Gram stain       AEROBIC BOTTLE Gram positive cocci IN GROUPS                  SMEAR CALLED TO AND CORRECTLY REPEATED BY: MARY CEDRA ON [id number] AT 9363 HRS TO 1396.            Culture       STAPHYLOCOCCUS SPECIES, COAGULASE NEGATIVE GROWING IN 1 OF 2 BOTTLES DRAWN SITE = LAC          Culture, Blood 2 [[id number]] Collected: 03/06/23 1530    Order Status: Completed Specimen: Blood Updated: 03/10/23 0655     Special Requests L ARM     Culture NO GROWTH 4 DAYS       Culture, Blood, PCR ID Panel [[id number]]  (Abnormal) Collected: 03/06/23 1530    Order Status: Completed Specimen: Blood Updated: 03/08/23 0200     Accession Number [id number]     Enterococcus faecalis by PCR Not detected        Enterococcus faecium by PCR Not detected        Listeria monocytogenes by PCR Not detected        STAPHYLOCOCCUS Detected        Staphylococcus Aureus Not detected        Staphylococcus epidermidis by PCR Detected        Staphylococcus lugdunensis by PCR Not detected        STREPTOCOCCUS Not detected        Streptococcus agalactiae (Group B) Not detected        Strep pneumoniae Not detected        Strep pyogenes,(Grp. A) Not detected        Acinetobacter calcoac baumannii complex by PCR Not detected        Bacteroides fragilis by PCR Not detected        Enterobacteriaceae by PCR Not detected        Enterobacter cloacae complex by PCR Not detected        Escherichia Coli Not detected        Klebsiella aerogenes by PCR Not detected        Klebsiella oxytoca by PCR Not detected        Klebsiella pneumoniae group by PCR Not detected        Proteus by PCR Not detected        Salmonella species by PCR Not detected        Serratia marcescens by PCR Not detected        Haemophilus Influenzae by PCR Not detected        Neisseria meningitidis by PCR Not detected        Pseudomonas aeruginosa Not detected        Stenotrophomonas maltophilia by PCR Not detected        Candida albicans by PCR Not detected        Candida auris by PCR Not detected        Candida glabrata Not detected        Candida krusei by PCR Not detected        Candida parapsilosis by PCR Not detected        Candida tropicalis by PCR Not detected        Cryptococcus neoformans/gattii by PCR Not detected        Resistant gene targets            Methicillin Resistance mecA/C  by PCR Not detected        Biofire test comment       False positive results may rarely occur. Correlate with clinical,epidemiologic, and other laboratory findings           Comment: Please see BCID Interpretation Guide in EPIC Links                   RADIOLOGY:    All available imaging studies/reports in [facility] for this admission were reviewed    Total time spent >35 minutes. High complexity decision making was performed during the evaluation of this patient at high risk for decompensation with multiple organ involvement     Above mentioned total time spent on reviewing the case/medical record/data/notes/EMR/patient examination/documentation/coordinating care with nurse/consultants, exclusive of procedures with complex decision making performed and > 50% time spent in face to face evaluation. Disclaimer: Sections of this note are dictated utilizing voice recognition software, which may have resulted in some phonetic based errors in grammar and contents. Even though attempts were made to correct all the mistakes, some may have been missed, and remained in the body of the document. If questions arise, please contact our department.     Dr. Reinaldo Land, Infectious Disease Specialist  [phone]  March 10, 2023  9:09 AM

## 2023-03-10 NOTE — PROGRESS NOTES
[facility]  Progress Note    Patient: Melissa Pabon Age: 60 y.o. : 1960 MR#: [id number] SSN: [id number]  Date/Time: 3/9/2023     Subjective:    Pt w/o specific complaints. Assessment/Plan:   60year old female with multiple medical conditions admitted after she presented w/ recurrent falls. Noted to have leukocytosis, tachycardia, elevated lactic acid as well has HS trop and incidental left sided neck lesion on CT. -Sepsis CT chest significant for finding of PNA  -C. diff colitis: +ve c.diff PCR on 3/8, on oral vanc  -Blood Cx on 3/6 -->coag negative Staph, contaminant vs true bacteremia, ID following, on Abx.  -Urine Cx on 3/6-->coag negative Staph, diptheroids ?contaminant vs true infection, ID following, on Abx  -Hypokalemia, normal mag, on oral replacement  -Neck mass: etiology unclear CT abd and chest without related findings. IR consulted for further eval, appreciate assistance.   -Impaired mobility in setting of above, PT/OT following    HISTORY OF:  -Depression  -Arrhythmia  -Rheumatoid arthritis  -Urgency incontinence  -Chronic pain syndrome    PLAN:  -IR consulted for neck lesion biopsy  -Abx per ID  -Replace and follow K  -Cont PT/OT    Dispo: LTC placement        Case discussed with:  [x]Patient  [x]Family  [x]Nursing  []Case Management  DVT Prophylaxis:  [x]Lovenox  []Hep SQ  []SCDs  []Coumadin   []Eliquis/Xarelto     Objective:   VS: BP (!) 159/75   Pulse 81   Temp 98.5 °F (36.9 °C) (Oral)   Resp 18   Ht 5' 4\" (1.626 m)   Wt 140 lb 10.5 oz (63.8 kg)   SpO2 91%   BMI 24.14 kg/m²    Tmax/24hrs: Temp (24hrs), Av.3 °F (36.8 °C), Min:97.8 °F (36.6 °C), Max:98.5 °F (36.9 °C)  IOBRIEF  Intake/Output Summary (Last 24 hours) at 3/9/2023 2130  Last data filed at 3/9/2023 0600  Gross per 24 hour   Intake 646.7 ml   Output 800 ml   Net -153.3 ml       General:  Alert, cooperative, no acute distress    HEENT:NCAT  Pulmonary: Bilateral basal minimal Rales, no wheezing  Cardiovascular: Regular rate and Rhythm. GI:  Soft, Non distended, Non tender. + Bowel sounds. Extremities:  No edema. No calf tenderness. Psych: Good insight. Not anxious or agitated. Neurologic: Alert and oriented X 3. Moves all ext.      Medications:   Current Facility-Administered Medications   Medication Dose Route Frequency    vancomycin (VANCOCIN) oral solution 125 mg  125 mg Oral 4 times per day    amLODIPine (NORVASC) tablet 5 mg  5 mg Oral Daily    cefTRIAXone (ROCEPHIN) 1,000 mg in sterile water 10 mL IV syringe  1,000 mg IntraVENous Q24H    propranolol (INDERAL) tablet 20 mg  20 mg Oral BID    sodium chloride flush 0.9 % injection 5-40 mL  5-40 mL IntraVENous 2 times per day    sodium chloride flush 0.9 % injection 5-40 mL  5-40 mL IntraVENous PRN    0.9 % sodium chloride infusion   IntraVENous PRN    enoxaparin (LOVENOX) injection 40 mg  40 mg SubCUTAneous Daily    ondansetron (ZOFRAN-ODT) disintegrating tablet 4 mg  4 mg Oral Q8H PRN    Or    ondansetron (ZOFRAN) injection 4 mg  4 mg IntraVENous Q6H PRN    polyethylene glycol (GLYCOLAX) packet 17 g  17 g Oral Daily PRN    acetaminophen (TYLENOL) tablet 650 mg  650 mg Oral Q6H PRN    Or    acetaminophen (TYLENOL) suppository 650 mg  650 mg Rectal Q6H PRN    famotidine (PEPCID) tablet 20 mg  20 mg Oral BID    aspirin chewable tablet 81 mg  81 mg Oral Daily    therapeutic multivitamin-minerals 1 tablet  1 tablet Oral Daily    busPIRone (BUSPAR) tablet 10 mg  10 mg Oral BID    sertraline (ZOLOFT) tablet 150 mg  150 mg Oral Daily       Labs:    Recent Results (from the past 24 hour(s))   Basic Metabolic Panel    Collection Time: 03/09/23  1:14 AM   Result Value Ref Range    Sodium 136 136 - 145 mmol/L    Potassium 2.8 (LL) 3.5 - 5.5 mmol/L    Chloride 104 100 - 111 mmol/L    CO2 21 21 - 32 mmol/L    Anion Gap 11 3.0 - 18 mmol/L    Glucose 85 74 - 99 mg/dL    BUN 10 7.0 - 18 MG/DL    Creatinine 0.30 (L) 0.6 - 1.3 MG/DL    Bun/Cre Ratio 33 (H) 12 - 20      Est, Glom Filt Rate >60 >60 ml/min/1.73m2    Calcium 8.0 (L) 8.5 - 10.1 MG/DL   CBC with Auto Differential    Collection Time: 03/09/23  1:14 AM   Result Value Ref Range    WBC 14.9 (H) 4.6 - 13.2 K/uL    RBC 3.93 (L) 4.20 - 5.30 M/uL    Hemoglobin 12.2 12.0 - 16.0 g/dL    Hematocrit 36.6 35.0 - 45.0 %    MCV 93.1 78.0 - 100.0 FL    MCH 31.0 24.0 - 34.0 PG    MCHC 33.3 31.0 - 37.0 g/dL    RDW 17.9 (H) 11.6 - 14.5 %    Platelets 796 (H) 441 - 420 K/uL    MPV 10.1 9.2 - 11.8 FL    Nucleated RBCs 0.0 0  WBC    nRBC 0.00 0.00 - 0.01 K/uL    Seg Neutrophils 78 (H) 40 - 73 %    Lymphocytes 10 (L) 21 - 52 %    Monocytes 10 3 - 10 %    Eosinophils % 1 0 - 5 %    Basophils 1 0 - 2 %    Immature Granulocytes 1 (H) 0.0 - 0.5 %    Segs Absolute 11.6 (H) 1.8 - 8.0 K/UL    Absolute Lymph # 1.5 0.9 - 3.6 K/UL    Absolute Mono # 1.4 (H) 0.05 - 1.2 K/UL    Absolute Eos # 0.1 0.0 - 0.4 K/UL    Basophils Absolute 0.1 0.0 - 0.1 K/UL    Absolute Immature Granulocyte 0.2 (H) 0.00 - 0.04 K/UL    Differential Type AUTOMATED     Magnesium    Collection Time: 03/09/23  1:14 AM   Result Value Ref Range    Magnesium 2.0 1.6 - 2.6 mg/dL   POCT Glucose    Collection Time: 03/09/23  7:49 AM   Result Value Ref Range    POC Glucose 94 70 - 110 mg/dL       Signed By: Gabby Escoto MD     March 9, 2023      Disclaimer: Sections of this note are dictated using utilizing voice recognition software. Minor typographical errors may be present. If questions arise, please do not hesitate to contact me or call our department.

## 2023-03-10 NOTE — OP NOTE
VASCULAR & INTERVENTIONAL RADIOLOGY PROCEDURE NOTE    Procedure: Percutaneous US-guided Lymph Node Biopsy/Aspiration    Pre-operative Diagnosis: Left cervical adenopathy    Post-operative Diagnosis: same    Indications: Tissue diagnosis    Sedation: Versed and Fentanyl      Findings:  Successful US guided biopsy  Target: Left cervical node  Biopsy Needle: 20G [place]  Passes: 1 core, which yielded fluid, then 10cc purulent fluid aspirated. Tract embolization: None    Specimens: 10cc purulent fluid           Complications:  None. Condition: Stable. Disposition: Return to nursing unit.

## 2023-03-10 NOTE — PROGRESS NOTES
Pt refused to participate in skilled OT intervention this date d/t fatigue and recently worked with physical therapy. OT  to follow.

## 2023-03-10 NOTE — PROGRESS NOTES
Pt back on unit in room 460      0945 Pt off unit to IR            0928 Held all morning medications due to NPO status/IR biopsy today. Last Lovenox injection was 3/9/23 at 1200  Pt has been NPO since midnight, CHG bath complete.

## 2023-03-10 NOTE — PROGRESS NOTES
Preprocedure Assessment    Today 3/10/2023     Indication/Symptoms:  Messi Sanchez is a 60 y.o. who presents for image guided biopsy of left neck mass. The H & P and/or progress notes and any available imaging were reviewed. The risks, indications and possible alternatives to the procedure, including doing nothing, were discussed and informed consent was obtained. Physical Exam:    Mallampati 2 ASA 2   General: A&O x 4, NAD   Heart:   RRR  Lungs:   Normal work of breathing    Sedation plan: Moderate sedation with intravenous fentanyl and Versed    The patient is an appropriate candidate to undergo the planned procedure and sedation.     Niecy Nguyen PA-C

## 2023-03-11 VITALS
SYSTOLIC BLOOD PRESSURE: 183 MMHG | BODY MASS INDEX: 23.56 KG/M2 | HEART RATE: 81 BPM | DIASTOLIC BLOOD PRESSURE: 83 MMHG | TEMPERATURE: 97.8 F | WEIGHT: 138.01 LBS | HEIGHT: 64 IN | OXYGEN SATURATION: 92 % | RESPIRATION RATE: 19 BRPM

## 2023-03-11 LAB
ANION GAP SERPL CALC-SCNC: 5 MMOL/L (ref 3–18)
BASOPHILS # BLD: 0.1 K/UL (ref 0–0.1)
BASOPHILS NFR BLD: 1 % (ref 0–2)
BUN SERPL-MCNC: 13 MG/DL (ref 7–18)
BUN/CREAT SERPL: 33 (ref 12–20)
CALCIUM SERPL-MCNC: 8.8 MG/DL (ref 8.5–10.1)
CHLORIDE SERPL-SCNC: 108 MMOL/L (ref 100–111)
CO2 SERPL-SCNC: 26 MMOL/L (ref 21–32)
CREAT SERPL-MCNC: 0.4 MG/DL (ref 0.6–1.3)
DIFFERENTIAL METHOD BLD: ABNORMAL
EOSINOPHIL # BLD: 0.2 K/UL (ref 0–0.4)
EOSINOPHIL NFR BLD: 2 % (ref 0–5)
ERYTHROCYTE [DISTWIDTH] IN BLOOD BY AUTOMATED COUNT: 17.9 % (ref 11.6–14.5)
GLUCOSE SERPL-MCNC: 108 MG/DL (ref 74–99)
HCT VFR BLD AUTO: 40 % (ref 35–45)
HGB BLD-MCNC: 13.3 G/DL (ref 12–16)
IMM GRANULOCYTES # BLD AUTO: 0.2 K/UL (ref 0–0.04)
IMM GRANULOCYTES NFR BLD AUTO: 2 % (ref 0–0.5)
LYMPHOCYTES # BLD: 1.6 K/UL (ref 0.9–3.6)
LYMPHOCYTES NFR BLD: 15 % (ref 21–52)
MAGNESIUM SERPL-MCNC: 2.5 MG/DL (ref 1.6–2.6)
MCH RBC QN AUTO: 30.7 PG (ref 24–34)
MCHC RBC AUTO-ENTMCNC: 33.3 G/DL (ref 31–37)
MCV RBC AUTO: 92.4 FL (ref 78–100)
MONOCYTES # BLD: 1 K/UL (ref 0.05–1.2)
MONOCYTES NFR BLD: 10 % (ref 3–10)
NEUTS SEG # BLD: 7.2 K/UL (ref 1.8–8)
NEUTS SEG NFR BLD: 71 % (ref 40–73)
NRBC # BLD: 0 K/UL (ref 0–0.01)
NRBC BLD-RTO: 0 PER 100 WBC
PLATELET # BLD AUTO: 518 K/UL (ref 135–420)
PMV BLD AUTO: 10.1 FL (ref 9.2–11.8)
POTASSIUM SERPL-SCNC: 3.1 MMOL/L (ref 3.5–5.5)
RBC # BLD AUTO: 4.33 M/UL (ref 4.2–5.3)
SODIUM SERPL-SCNC: 139 MMOL/L (ref 136–145)
WBC # BLD AUTO: 10.1 K/UL (ref 4.6–13.2)

## 2023-03-11 PROCEDURE — 2580000003 HC RX 258: Performed by: INTERNAL MEDICINE

## 2023-03-11 PROCEDURE — 6360000002 HC RX W HCPCS: Performed by: HOSPITALIST

## 2023-03-11 PROCEDURE — 36415 COLL VENOUS BLD VENIPUNCTURE: CPT

## 2023-03-11 PROCEDURE — 6370000000 HC RX 637 (ALT 250 FOR IP): Performed by: INTERNAL MEDICINE

## 2023-03-11 PROCEDURE — 99239 HOSP IP/OBS DSCHRG MGMT >30: CPT | Performed by: INTERNAL MEDICINE

## 2023-03-11 PROCEDURE — 85025 COMPLETE CBC W/AUTO DIFF WBC: CPT

## 2023-03-11 PROCEDURE — 6360000002 HC RX W HCPCS: Performed by: INTERNAL MEDICINE

## 2023-03-11 PROCEDURE — 80048 BASIC METABOLIC PNL TOTAL CA: CPT

## 2023-03-11 PROCEDURE — 2580000003 HC RX 258: Performed by: HOSPITALIST

## 2023-03-11 PROCEDURE — 83735 ASSAY OF MAGNESIUM: CPT

## 2023-03-11 PROCEDURE — 6370000000 HC RX 637 (ALT 250 FOR IP): Performed by: HOSPITALIST

## 2023-03-11 RX ORDER — PROPRANOLOL HYDROCHLORIDE 20 MG/1
20 TABLET ORAL 2 TIMES DAILY
Qty: 90 TABLET | Refills: 0 | Status: SHIPPED | DISCHARGE
Start: 2023-03-11

## 2023-03-11 RX ORDER — AMLODIPINE BESYLATE 5 MG/1
5 TABLET ORAL DAILY
Qty: 30 TABLET | Refills: 0 | Status: SHIPPED | DISCHARGE
Start: 2023-03-12

## 2023-03-11 RX ORDER — M-VIT,TX,IRON,MINS/CALC/FOLIC 27MG-0.4MG
1 TABLET ORAL DAILY
Status: SHIPPED | DISCHARGE
Start: 2023-03-12

## 2023-03-11 RX ORDER — POTASSIUM CHLORIDE 20 MEQ/1
40 TABLET, EXTENDED RELEASE ORAL ONCE
Status: COMPLETED | OUTPATIENT
Start: 2023-03-11 | End: 2023-03-11

## 2023-03-11 RX ORDER — METRONIDAZOLE 500 MG/1
500 TABLET ORAL 3 TIMES DAILY
Qty: 14 TABLET | Refills: 0 | Status: SHIPPED | OUTPATIENT
Start: 2023-03-11 | End: 2023-03-18

## 2023-03-11 RX ORDER — CEFUROXIME AXETIL 500 MG/1
500 TABLET ORAL 2 TIMES DAILY
Qty: 14 TABLET | Refills: 0 | Status: SHIPPED | OUTPATIENT
Start: 2023-03-11 | End: 2023-03-18

## 2023-03-11 RX ORDER — FAMOTIDINE 20 MG/1
20 TABLET, FILM COATED ORAL 2 TIMES DAILY
Qty: 60 TABLET | Refills: 0 | Status: SHIPPED | DISCHARGE
Start: 2023-03-11

## 2023-03-11 RX ORDER — ASPIRIN 81 MG/1
81 TABLET, CHEWABLE ORAL DAILY
Qty: 30 TABLET | Refills: 3 | Status: SHIPPED | DISCHARGE
Start: 2023-03-12

## 2023-03-11 RX ADMIN — BUSPIRONE HYDROCHLORIDE 10 MG: 5 TABLET ORAL at 10:32

## 2023-03-11 RX ADMIN — AMLODIPINE BESYLATE 5 MG: 5 TABLET ORAL at 10:31

## 2023-03-11 RX ADMIN — VANCOMYCIN HYDROCHLORIDE 125 MG: 1 INJECTION, POWDER, LYOPHILIZED, FOR SOLUTION INTRAVENOUS at 08:30

## 2023-03-11 RX ADMIN — SODIUM CHLORIDE, PRESERVATIVE FREE 10 ML: 5 INJECTION INTRAVENOUS at 10:44

## 2023-03-11 RX ADMIN — VANCOMYCIN HYDROCHLORIDE 125 MG: 1 INJECTION, POWDER, LYOPHILIZED, FOR SOLUTION INTRAVENOUS at 12:14

## 2023-03-11 RX ADMIN — FAMOTIDINE 20 MG: 20 TABLET ORAL at 10:31

## 2023-03-11 RX ADMIN — ASPIRIN 81 MG CHEWABLE TABLET 81 MG: 81 TABLET CHEWABLE at 10:32

## 2023-03-11 RX ADMIN — SERTRALINE 150 MG: 50 TABLET, FILM COATED ORAL at 10:31

## 2023-03-11 RX ADMIN — SODIUM CHLORIDE, PRESERVATIVE FREE 10 ML: 5 INJECTION INTRAVENOUS at 09:00

## 2023-03-11 RX ADMIN — VANCOMYCIN HYDROCHLORIDE 125 MG: 1 INJECTION, POWDER, LYOPHILIZED, FOR SOLUTION INTRAVENOUS at 02:38

## 2023-03-11 RX ADMIN — ENOXAPARIN SODIUM 40 MG: 100 INJECTION SUBCUTANEOUS at 09:30

## 2023-03-11 RX ADMIN — PROPRANOLOL HYDROCHLORIDE 20 MG: 10 TABLET ORAL at 10:32

## 2023-03-11 RX ADMIN — POTASSIUM CHLORIDE 40 MEQ: 1500 TABLET, EXTENDED RELEASE ORAL at 11:33

## 2023-03-11 ASSESSMENT — PAIN SCALES - GENERAL: PAINLEVEL_OUTOF10: 0

## 2023-03-11 NOTE — PROGRESS NOTES
Called Vj Peralta in case mgmt who is working on patient's arrangements with SNF and will stop by to see the patient.

## 2023-03-11 NOTE — PROGRESS NOTES
Spoke to Dr. Ian Alexis and asked her to contact patient's mother Cyndi Pierce at cell [phone], also she left a home # [phone]. She would like to discuss C. Diff dx as well as biopsy of lesion on neck.

## 2023-03-11 NOTE — DISCHARGE SUMMARY
[facility]  [facility]       Patient: Messi Sanchez Age: 60 y.o. : 1960 MR#: [id number] SSN: [id number]  PCP on record: Aleksandar Pereira MD  Admit date: 3/6/2023  Discharge date: 3/11/2023    Consults:  -JOHN Rodriguez,-ID  -DWIGHT Gonzalez,-neurology  -Ms Sharad Ceja, [place], PA- IR  -   Procedures:on 3/10/23: Procedure: Percutaneous US-guided Lymph Node Biopsy/Aspiration     Findings:  Successful US guided biopsy  Target: Left cervical node  Biopsy Needle: 20G [employer] [place]  Passes: 1 core, which yielded fluid, then 10cc purulent fluid aspirated. Tract embolization: None  Significant Diagnostic Studies: -CT chest abdomen pelvis w/ IV contrast on 3/8/23:  Impression   1. Groundglass opacities with partially consolidative peribronchial infiltrates   posterior left upper lobe and more diffusely left lower lobe. Findings most   consistent with pneumonia at these levels. -Mildly prominent mediastinal lymph nodes could be reactive.   -Follow-up imaging warranted to document resolution        2. Scattered minor groundglass attenuation foci with small left and very small   right pleural effusions possibly representing mild degree of underlying edema. 3. Hepatic steatosis. Possible small cyst.       4. No bowel distention to suggest obstruction. No acute inflammatory change   identified. No free fluid. -  CT soft tissue neck w contrast on 3/7/23:  Impression       Left neck ovoid lesion at approximately the left level 2/3. May represent an   enlarged lymph node, possibly a necrotic malignant lymph node. Alternative   consideration for brachial cleft cyst however this is not apparent on prior MR   exams. Consider tissue sampling and/or PET/CT. -CT head on 3/6/23:  Impression   :       1. No acute intracranial hemorrhage or territorial infarct. Progression of white   matter disease, nonspecific but usually small vessel ischemic sequelae.    -CT cervical spine wo cont on3/6/23:  Impression   :       1. There is an incidental finding of presumed adenopathy at the left side of the   neck. This requires further investigation. Options include ultrasound or neck CT   with IV contrast, or directly proceeding to PET CT, as this will ultimately   likely be required. 2. No acute traumatic finding of the cervical spine   -Moderate degenerative findings; no high-grade spinal stenosis       3. Discussed by me with HEIDI Swain at 1913 hours     -CT abd pelv wo cont on 3/6/23:  Impression   :       1. No acute findings within the abdomen or pelvis   -A few tiny nonspecific hypodensities within the liver        2. Details and incidentals as above. -CXR on 3/8/23:  Impression   1. Mild pulmonary vascular congestion has increased. -MRI brain on 3/7/23:  Impression       1. No acute intracranial process identified. 2.  Extensive, confluent white matter changes most consistent with moderate to   advanced advanced microvascular ischemic disease, progressed compared to prior   MRI. Discharge Diagnoses: -Sepsis  -Pneumonia  -C.diff colitis  -Hypokalemia  -Neck mass  -Impaired mobility                                    Patient Active Problem List   Diagnosis    Severe recurrent depression with psychosis (HCC)    Lumbar radiculopathy    Rheumatoid aortitis    Fibromyalgia    Urgency incontinence    Lumbosacral spondylosis without myelopathy    Spasm of muscle    Generalized anxiety disorder    Chronic pain syndrome    Postlaminectomy syndrome, lumbar region    Lumbar degenerative disc disease    Sepsis ([address]    Essential tremor    Gait difficulty       Hospital Course by Problem     60year old female with multiple medical conditions admitted after she presented w/ recurrent falls. Noted to have evidence of sepsis given leukocytosis, tachycardia, elevated lactic acid as well has HS troponin elevation and incidental left sided neck lesion on CT.      Her presentation was thought to be due to partially treated urinary tract infection/evolving C. difficile colitis. Some degree of concern for pneumonia given CT chest findings. She was evaluated by the infectious disease specialist and has been treated with oral vancomycin for the C. difficile colitis. She is to finish a 14-day course, last day is 03/23/2023. Patient's diarrhea has improved. He is continue to monitor for worsening colitis/infection. Please make sure patient finishes course of her oral vancomycin and strongly consider risk of C. difficile recurrence when considering treatment with antibiotics for this patient. Patient had an incidental finding of left-sided neck mass thought to be due to lymphadenopathy. She had imaging for further delineation; please see above/pasted CT results. Ultimately, patient had biopsy of the lesion and the result of the biopsy is pending at this time. Per procedure report, purulent material was aspirated, however, sample was not sent for microbiology analysis/culture. Discussed with infectious disease specialist and given purulent nature of aspirated fluid it would be cautious to treat with a course of antibiotics. Recommendation made to treat with cefuroxime and Flagyl over the course of 7 days. Vancomycin regimen has been extended to 14 days to cover for relapse of C. difficile. Please follow up with patient's pathology report which should be available in the next 1 week or so. Depending on findings she may need additional evaluation and management. Patient had blood and urine culture positive for coag negative staph, diphtheroids in the urine. There is some suspicion that these findings are due to contamination rather than true bacteremia or urine infection. In any case, patient was evaluated by the infectious disease specialist and has had course of Rocephin during her stay here. Patient has had recurrent hypokalemia with normal magnesium.   It might be due to GI loss due to the diarrhea. Please continue to monitor her potassium intermittently and replace as needed. The hope is that as the diarrhea resolves her electrolyte abnormality will no longer be an issue.    -Impaired mobility in setting of above, PT/OT following. Patient will need ongoing physical and occupational therapy. HISTORY OF:  -Depression: On medications  -Arrhythmia  -Rheumatoid arthritis: Not currently on any type of treatment  -Urgency incontinence  -Chronic pain syndrome        Today's examination of the patient revealed:     Subjective:   Pt has no complaints. Appears comfortable. Reports diarrhea is much improved, has about one loose stool a day.   Objective:   VS: BP (!) 183/83   Pulse 81   Temp 97.8 °F (36.6 °C) (Oral)   Resp 19   Ht 5' 4\" (1.626 m)   Wt 138 lb 0.1 oz (62.6 kg)   SpO2 92%   BMI 23.69 kg/m²    Tmax/24hrs: Temp (24hrs), Av °F (36.7 °C), Min:97.5 °F (36.4 °C), Max:98.3 °F (36.8 °C)     Input/Output:   Intake/Output Summary (Last 24 hours) at 3/11/2023 1126  Last data filed at 3/11/2023 1056  Gross per 24 hour   Intake 240 ml   Output 1000 ml   Net -760 ml     Genera: alert, awake, in NAD  HEENT: NCAT, sclerae anicteric,  mmm, neck supple  COR: rrr, no murmurs  PULM: CTAB  ABD: soft, nt, nd  EXT: no edema  NEURO: follows commands, no gross focal abnormalities, speech normal, no tremors  PSYCH: non agitated        Labs:    Recent Results (from the past 24 hour(s))   CBC with Auto Differential    Collection Time: 23  4:00 AM   Result Value Ref Range    WBC 10.1 4.6 - 13.2 K/uL    RBC 4.33 4.20 - 5.30 M/uL    Hemoglobin 13.3 12.0 - 16.0 g/dL    Hematocrit 40.0 35.0 - 45.0 %    MCV 92.4 78.0 - 100.0 FL    MCH 30.7 24.0 - 34.0 PG    MCHC 33.3 31.0 - 37.0 g/dL    RDW 17.9 (H) 11.6 - 14.5 %    Platelets 909 (H) 844 - 420 K/uL    MPV 10.1 9.2 - 11.8 FL    Nucleated RBCs 0.0 0  WBC    nRBC 0.00 0.00 - 0.01 K/uL    Seg Neutrophils 71 40 - 73 %    Lymphocytes 15 (L) 21 - 52 %    Monocytes 10 3 - 10 %    Eosinophils % 2 0 - 5 %    Basophils 1 0 - 2 %    Immature Granulocytes 2 (H) 0.0 - 0.5 %    Segs Absolute 7.2 1.8 - 8.0 K/UL    Absolute Lymph # 1.6 0.9 - 3.6 K/UL    Absolute Mono # 1.0 0.05 - 1.2 K/UL    Absolute Eos # 0.2 0.0 - 0.4 K/UL    Basophils Absolute 0.1 0.0 - 0.1 K/UL    Absolute Immature Granulocyte 0.2 (H) 0.00 - 0.04 K/UL    Differential Type AUTOMATED     Basic Metabolic Panel w/ Reflex to MG    Collection Time: 03/11/23  4:00 AM   Result Value Ref Range    Sodium 139 136 - 145 mmol/L    Potassium 3.1 (L) 3.5 - 5.5 mmol/L    Chloride 108 100 - 111 mmol/L    CO2 26 21 - 32 mmol/L    Anion Gap 5 3.0 - 18 mmol/L    Glucose 108 (H) 74 - 99 mg/dL    BUN 13 7.0 - 18 MG/DL    Creatinine 0.40 (L) 0.6 - 1.3 MG/DL    Bun/Cre Ratio 33 (H) 12 - 20      Est, Glom Filt Rate >60 >60 ml/min/1.73m2    Calcium 8.8 8.5 - 10.1 MG/DL   Magnesium    Collection Time: 03/11/23  4:00 AM   Result Value Ref Range    Magnesium 2.5 1.6 - 2.6 mg/dL     Additional Data Reviewed:     Condition on discharge:stable   Disposition:    []Home   []Home with Home Health   [x]SNF/NH   []Rehab   []Home with family   []Alternate Facility:____________________      Discharge Medications:     Current Discharge Medication List        START taking these medications    Details   aspirin 81 MG chewable tablet Take 1 tablet by mouth daily  Qty: 30 tablet, Refills: 3      propranolol (INDERAL) 20 MG tablet Take 1 tablet by mouth 2 times daily  Qty: 90 tablet, Refills: 0      amLODIPine (NORVASC) 5 MG tablet Take 1 tablet by mouth daily  Qty: 30 tablet, Refills: 0      Multiple Vitamins-Minerals (THERAPEUTIC MULTIVITAMIN-MINERALS) tablet Take 1 tablet by mouth daily      famotidine (PEPCID) 20 MG tablet Take 1 tablet by mouth 2 times daily  Qty: 60 tablet, Refills: 0      vancomycin (VANCOCIN) 50 mg/mL oral solution Take 2.5 mLs by mouth in the morning and 2.5 mLs at noon and 2.5 mLs in the evening and 2.5 mLs before bedtime. Do all this for 13 days. Qty: 130 mL, Refills: 0      cefUROXime (CEFTIN) 500 MG tablet Take 1 tablet by mouth 2 times daily for 7 days  Qty: 14 tablet, Refills: 0      metroNIDAZOLE (FLAGYL) 500 MG tablet Take 1 tablet by mouth 3 times daily for 7 days  Qty: 14 tablet, Refills: 0           CONTINUE these medications which have CHANGED    Details   sertraline (ZOLOFT) 50 MG tablet Take 3 tablets by mouth daily  Qty: 30 tablet, Refills: 3           CONTINUE these medications which have NOT CHANGED    Details   busPIRone (BUSPAR) 10 MG tablet take 1 tablet by mouth twice a day           STOP taking these medications       risperiDONE (RISPERDAL) 1 MG tablet Comments:   Reason for Stopping:         zolpidem (AMBIEN) 10 MG tablet Comments:   Reason for Stopping: Follow-up Appointments:   1. Your PCP: Iris Paredes MD, within 7-10days  2. Needs follow up on pathology report. PENDING at time of discharge.      >30 minutes spent coordinating this discharge (review instructions/follow-up, prescriptions, preparing report for sign off)    Signed:  Royal Atremio MD  3/11/2023  11:26 AM

## 2023-03-11 NOTE — CARE COORDINATION
Notified earlier by Dr Colletta Dunks that patient is confirmed for dc today. Patient to dc to [facility]. CM phoned and spoke with [facility]. to confirm today's discharge. WA[facility]AR placed call to transportation services for [facility] [place]., spoke with Baudilio Van from [facility]. Given  Case ID #[id number]. Transport will arrive  within 30 min - 3 hours. Information shared with patient and CN    When CM went to patient room, mother  Katya Ports also in room at bedside & wanted to confirm facility address. CM confirmed address on [address] [place] [address] Dr (she said they wouldn't go to \"the other one\". )  Discharge Summary faxed to facility      Discharge:  [id number] [facility] [address],  [place]  Transport: BLS Stretcher transport through insur:  (Unknown Co) with expected  before 3:30 PM.    Discharge Packet:  placed at nursing station,      Tami Doran, RN CCM.   Interim Mgr WAQ[facility] Dept

## 2023-03-11 NOTE — PROGRESS NOTES
Paging Dr. Dioni Rolon since patient and her mother have questions regarding C-Difficile infection patient has.

## 2023-03-11 NOTE — PROGRESS NOTES
[address] [facility],[address] Floor of [facility], [place] to give report on patient. Rizwan Martinez said that no nurses are available however she will have a nurse call me back.

## 2023-03-11 NOTE — PROGRESS NOTES
[facility]  Progress Note    Patient: Angeline Escalera Age: 60 y.o. : 1960 MR#: [id number] SSN: [id number]  Date/Time: 3/10/2023     Subjective:    Pt difficulty with speaking due to ?vocal cord issues. Seen with mother at bedside. Assessment/Plan:   60year old female with multiple medical conditions admitted after she presented w/ recurrent falls. Noted to have leukocytosis, tachycardia, elevated lactic acid as well has HS trop and incidental left sided neck lesion on CT. -Sepsis CT chest significant for finding of PNA  -C. diff colitis: +ve c.diff PCR on 3/8, on oral vanc  -Blood Cx on 3/6 -->coag negative Staph, contaminant vs true bacteremia, ID following, IV Abx dc'd by ID today  -Urine Cx on 3/6-->coag negative Staph, diptheroids ?contaminant vs true infection, ID following, IV Abx dc'd by ID  -Hypokalemia, normal mag, on oral replacement  -Neck mass: etiology unclear CT abd and chest without related findings. S/p biopsy by IR today.    -Impaired mobility in setting of above, PT/OT following    HISTORY OF:  -Depression  -Arrhythmia  -Rheumatoid arthritis  -Urgency incontinence  -Chronic pain syndrome    PLAN:  -Follow neck mass bx result  -Abx per ID  -Replace and follow K  -Cont PT/OT    Dispo: LTC placement        Case discussed with:  [x]Patient  [x]Family  [x]Nursing  []Case Management  DVT Prophylaxis:  [x]Lovenox  []Hep SQ  []SCDs  []Coumadin   []Eliquis/Xarelto     Objective:   VS: BP (!) 151/70   Pulse 78   Temp 98.2 °F (36.8 °C) (Oral)   Resp 20   Ht 5' 4\" (1.626 m)   Wt 138 lb 0.1 oz (62.6 kg)   SpO2 94%   BMI 23.69 kg/m²    Tmax/24hrs: Temp (24hrs), Av.1 °F (36.7 °C), Min:97.9 °F (36.6 °C), Max:98.3 °F (36.8 °C)  IOBRIEFNo intake or output data in the 24 hours ending 03/10/23 1656      General:  Alert, cooperative, no acute distress    HEENT:NCAT  Pulmonary: Bilateral basal minimal Rales, no wheezing  Cardiovascular: Regular rate and Rhythm. GI:  Soft, Non distended, Non tender. + Bowel sounds. Extremities:  No edema. No calf tenderness. Psych: Good insight. Not anxious or agitated. Neurologic: Alert and oriented X 3. Moves all ext.      Medications:   Current Facility-Administered Medications   Medication Dose Route Frequency    vancomycin (VANCOCIN) oral solution 125 mg  125 mg Oral 4 times per day    amLODIPine (NORVASC) tablet 5 mg  5 mg Oral Daily    propranolol (INDERAL) tablet 20 mg  20 mg Oral BID    sodium chloride flush 0.9 % injection 5-40 mL  5-40 mL IntraVENous 2 times per day    sodium chloride flush 0.9 % injection 5-40 mL  5-40 mL IntraVENous PRN    0.9 % sodium chloride infusion   IntraVENous PRN    enoxaparin (LOVENOX) injection 40 mg  40 mg SubCUTAneous Daily    ondansetron (ZOFRAN-ODT) disintegrating tablet 4 mg  4 mg Oral Q8H PRN    Or    ondansetron (ZOFRAN) injection 4 mg  4 mg IntraVENous Q6H PRN    polyethylene glycol (GLYCOLAX) packet 17 g  17 g Oral Daily PRN    acetaminophen (TYLENOL) tablet 650 mg  650 mg Oral Q6H PRN    Or    acetaminophen (TYLENOL) suppository 650 mg  650 mg Rectal Q6H PRN    famotidine (PEPCID) tablet 20 mg  20 mg Oral BID    aspirin chewable tablet 81 mg  81 mg Oral Daily    therapeutic multivitamin-minerals 1 tablet  1 tablet Oral Daily    busPIRone (BUSPAR) tablet 10 mg  10 mg Oral BID    sertraline (ZOLOFT) tablet 150 mg  150 mg Oral Daily       Labs:    Recent Results (from the past 24 hour(s))   Protime-INR    Collection Time: 03/10/23  4:09 AM   Result Value Ref Range    Protime 15.0 11.5 - 15.2 sec    INR 1.1 0.8 - 1.2     Basic Metabolic Panel    Collection Time: 03/10/23  4:09 AM   Result Value Ref Range    Sodium 137 136 - 145 mmol/L    Potassium 3.4 (L) 3.5 - 5.5 mmol/L    Chloride 106 100 - 111 mmol/L    CO2 26 21 - 32 mmol/L    Anion Gap 5 3.0 - 18 mmol/L    Glucose 99 74 - 99 mg/dL    BUN 13 7.0 - 18 MG/DL    Creatinine 0.32 (L) 0.6 - 1.3 MG/DL    Bun/Cre Ratio 41 (H) 12 - 20 Est, Glom Filt Rate >60 >60 ml/min/1.73m2    Calcium 8.5 8.5 - 10.1 MG/DL   CBC with Auto Differential    Collection Time: 03/10/23  4:09 AM   Result Value Ref Range    WBC 11.2 4.6 - 13.2 K/uL    RBC 4.05 (L) 4.20 - 5.30 M/uL    Hemoglobin 12.4 12.0 - 16.0 g/dL    Hematocrit 37.2 35.0 - 45.0 %    MCV 91.9 78.0 - 100.0 FL    MCH 30.6 24.0 - 34.0 PG    MCHC 33.3 31.0 - 37.0 g/dL    RDW 17.9 (H) 11.6 - 14.5 %    Platelets 102 (H) 980 - 420 K/uL    MPV 10.1 9.2 - 11.8 FL    Nucleated RBCs 0.0 0  WBC    nRBC 0.00 0.00 - 0.01 K/uL    Seg Neutrophils 72 40 - 73 %    Lymphocytes 15 (L) 21 - 52 %    Monocytes 9 3 - 10 %    Eosinophils % 2 0 - 5 %    Basophils 1 0 - 2 %    Immature Granulocytes 2 (H) 0.0 - 0.5 %    Segs Absolute 8.0 1.8 - 8.0 K/UL    Absolute Lymph # 1.7 0.9 - 3.6 K/UL    Absolute Mono # 1.0 0.05 - 1.2 K/UL    Absolute Eos # 0.2 0.0 - 0.4 K/UL    Basophils Absolute 0.1 0.0 - 0.1 K/UL    Absolute Immature Granulocyte 0.2 (H) 0.00 - 0.04 K/UL    Differential Type AUTOMATED         Signed By: Queen Jarrell MD     March 10, 2023      Disclaimer: Sections of this note are dictated using utilizing voice recognition software. Minor typographical errors may be present. If questions arise, please do not hesitate to contact me or call our department. Class I (easy) - visualization of the soft palate, fauces, uvula, and both anterior and posterior pillars

## 2023-03-11 NOTE — PROGRESS NOTES
Paged Dr. Jessy Reeves for patient and mother who are asking in light of upcoming discharge next step regarding her neck biopsy. I told patient when results are in she will be informed. Dr. Jessy Reeves said she informed them yesterday as to next steps, however she will stop by and speak to them again.

## 2023-03-12 LAB
BACTERIA SPEC CULT: NORMAL
BACTERIA SPEC CULT: NORMAL
SERVICE CMNT-IMP: NORMAL
SERVICE CMNT-IMP: NORMAL

## 2023-03-13 NOTE — PROGRESS NOTES
Infectious Disease progress Note  Was called by dr. Noah Winn about purulent fluid noted during left neck lymph node aspirate 3/10      Reason: Gram-positive bloodstream infection, sepsis, cystitis    Current abx Prior abx   Ceftriaxone since 3/6  Vancomycin since 3/6      Lines:       Assessment :   60 y.o. female with medical co-morbidities including Depression, Fibromyalgia, Rheumatoid arthritis, tobacco smoking dependence, presented to [facility] from home on 3/6/23 with recurrent falls for  3 days. Now with leukocytosis, significant pyuria, hematuria, elevated lactate, tachycardia on presentation, positive blood culture    Clinical presentation consistent with partially treated sepsis due to partially treated urinary tract infection/evolving C. difficile colitis    Positive stool c.diff pcr on 3/8/23  Repeat urine cx 3/8: No growth    Tremulousness, necrotic lymph node in neck: concerning for malignancy s/p biopsy on 3/10/23.   10 cc purulent fluid noted- ? Superficial abscess ? Superinfection of necrotic lymph node. No significant pain/erythema/tenderness noted on the left neck on prior exam  No CT findings to suggest deeper infection    1 of 2 sets of blood cultures 3/6 positive for coagulase-negative Staphylococcus: Likely contaminant. Negative repeat blood culture    . Urine culture 3/6-> 100,000 colonies of multiple types of coagulase-negative Staphylococcus, diphtheroids -likely contaminated by skin kellie    Hypoxia: likely due to volume overload, COPD. improved    Antibiotic management complicated due to concurrent C. difficile, risk of worsening colitis with broad-spectrum antibiotics. Hence will attempt to treat left neck superficial abscess with antibiotics which is less likely to worsen C. Difficile    Recommendations:    Recommend cefuroxime, metronidazole till 3/18/2023.   Recommend oral vancomycin 125 mg p.o. every 6 hours till 3/23/2023 for C. difficile infection  Diuresis per primary team  Follow-up neurology recommendations  4. Follow-up results of lymph node biopsy  5. Discharge planning per primary team     Above plan was discussed in details with dr Hudson Echevarria. Time spent greater than 20 minutes. Please call me if any further questions or concerns. HPI:          Past Medical History:   Diagnosis Date    Arrhythmia     palpitations  heart murmur    Depression     Headache(784.0)     UTI (urinary tract infection)        Past Surgical History:   Procedure Laterality Date    APPENDECTOMY      GYN  1989    partial hysterectomy     ORTHOPEDIC SURGERY  2007    lumbar fusion     [facility]         [facility]    No current facility-administered medications for this encounter. Current Outpatient Medications   Medication Sig    sertraline (ZOLOFT) 50 MG tablet Take 3 tablets by mouth daily    aspirin 81 MG chewable tablet Take 1 tablet by mouth daily    propranolol (INDERAL) 20 MG tablet Take 1 tablet by mouth 2 times daily    amLODIPine (NORVASC) 5 MG tablet Take 1 tablet by mouth daily    Multiple Vitamins-Minerals (THERAPEUTIC MULTIVITAMIN-MINERALS) tablet Take 1 tablet by mouth daily    famotidine (PEPCID) 20 MG tablet Take 1 tablet by mouth 2 times daily    vancomycin (VANCOCIN) 50 mg/mL oral solution Take 2.5 mLs by mouth in the morning and 2.5 mLs at noon and 2.5 mLs in the evening and 2.5 mLs before bedtime. Do all this for 13 days.     cefUROXime (CEFTIN) 500 MG tablet Take 1 tablet by mouth 2 times daily for 7 days    metroNIDAZOLE (FLAGYL) 500 MG tablet Take 1 tablet by mouth 3 times daily for 7 days    busPIRone (BUSPAR) 10 MG tablet take 1 tablet by mouth twice a day       Allergies: Latex and Codeine    Family History   Problem Relation Age of Onset    Cancer Other         breast    Hypertension Father     Coronary Art Dis Other         grandmother    Diabetes Mother     Hypertension Mother     Diabetes Father      Social History     Socioeconomic History    Marital status:      Spouse name: Not on file    Number of children: Not on file    Years of education: Not on file    Highest education level: Not on file   Occupational History    Not on file   Tobacco Use    Smoking status: Every Day     Packs/day: 0.50     Types: Cigarettes    Smokeless tobacco: Never   Substance and Sexual Activity    Alcohol use: No    Drug use: No    Sexual activity: Not on file     Comment: Hysterectomy   Other Topics Concern    Not on file   Social History Narrative    Not on file     Social Determinants of Health     Financial Resource Strain: Not on file   Food Insecurity: Not on file   Transportation Needs: Not on file   Physical Activity: Not on file   Stress: Not on file   Social Connections: Not on file   Intimate Partner Violence: Not on file   Housing Stability: Not on file     Social History     Tobacco Use   Smoking Status Every Day    Packs/day: 0.50    Types: Cigarettes   Smokeless Tobacco Never        No data recorded.    BP (!) 183/83   Pulse 81   Temp 97.8 °F (36.6 °C) (Oral)   Resp 19   Ht 5' 4\" (1.626 m)   Wt 138 lb 0.1 oz (62.6 kg)   SpO2 92%   BMI 23.69 kg/m²     ROS:     Physical Exam:        Labs: Results:   Chemistry Recent Labs     03/11/23  0400   GLUCOSE 108*      K 3.1*      CO2 26   BUN 13   CREATININE 0.40*        CBC w/Diff Recent Labs     03/11/23  0400   WBC 10.1   RBC 4.33   HGB 13.3   HCT 40.0   *        Microbiology Results       Procedure Component Value Units Date/Time    Culture, Urine [[id number]] Collected: 03/08/23 1435    Order Status: Completed Specimen: Urine, clean catch Updated: 03/10/23 0855     Special Requests NO SPECIAL REQUESTS        Culture No growth (<1,000 CFU/ML)       Enteric Bact Panel, DNA [[id number]] Collected: 03/08/23 1045    Order Status: Completed Specimen: Stool Updated: 03/09/23 2216     SHIGELLA SPECIES, DNA Negative        CAMPYLOBACTER SPECIES, DNA Negative        VIBRIO SPECIES, DNA Negative     ENTEROTOXIGEN E COLI, DNA Negative        SHIGA TOXIN PRODUCING, DNA Negative        SALMONELLA SPECIES, DNA Negative        P. SHIGELLOIDES, DNA Negative        Y. ENTEROCOLITICA, DNA Negative       Clostridium difficile toxin/antigen [[id number]]  (Abnormal) Collected: 03/08/23 1045    Order Status: Completed Specimen: Stool from Miscellaneous sample Updated: 03/08/23 1330     Reflex       See Reflex order for C. difficile (DNA)           C Diff Toxin Interpretation       Indeterminate for Toxigenic C. difficile, DNA confirmation to follow. C difficile Molecular/PCR [[id number]]  (Abnormal) Collected: 03/08/23 1045    Order Status: Completed Specimen: Miscellaneous sample Updated: 03/08/23 1330     C. difficile toxin Molecular Positive        Comment: This specimen is positive for toxigenic C difficile by DNA amplification. Repeat testing is not recommended, samples received within 7 days of this positive result will be rejected. Assay is not approved to test for cure, since nucleic acids may persist after effective treatment and may prompt false positive results.   CALLED TO AND CORRECTLY REPEATED BY:  MARY VALENTINE,4N,AT 1329 TO [address] [facility] [place] ON 3/8/23         Culture, Blood 1 [[id number]] Collected: 03/08/23 0604    Order Status: Completed Specimen: Blood Updated: 03/13/23 0904     Special Requests --        RIGHT  HAND       Culture NO GROWTH 5 DAYS       Blood Culture 1 [[id number]] Collected: 03/06/23 1630    Order Status: Canceled Specimen: Blood     Culture, Urine [[id number]] Collected: 03/06/23 1622    Order Status: Completed Specimen: Urine, clean catch Updated: 03/08/23 0846     Special Requests NO SPECIAL REQUESTS        [place] count --        >100,000  COLONIES/mL       Culture       MIXED UROGENITAL LORI ISOLATED          COVID-19 & Influenza Combo [[id number]] Collected: 03/06/23 1530    Order Status: Completed Specimen: Nasopharyngeal Updated: 03/06/23 1606     SARS-CoV-2, PCR Not detected Comment: Not Detected results do not preclude SARS-CoV-2 infection and should not be used as the sole basis for patient management decisions. Results must be combined with clinical observations, patient history, and epidemiological information. Rapid Influenza A By PCR Not detected        Rapid Influenza B By PCR Not detected        Comment: Testing was performed using alex Dulce SARS-CoV-2 and Influenza A/B nucleic acid assay. This test is a multiplex Real-Time Reverse Transcriptase Polymerase Chain Reaction (RT-PCR) based in vitro diagnostic test intended for the qualitative detection of nucleic acids from SARS-CoV-2, Influenza A, and Influenza B in nasopharyngeal for use under the FDA's Emergency Use Authorization(EAU) only. Fact sheet for Patients: FindDrives.pl  Fact sheet for Healthcare Providers: FindDrives.pl         Culture, Blood 1 [[id number]]  (Abnormal) Collected: 03/06/23 1530    Order Status: Completed Specimen: Blood Updated: 03/08/23 1527     Special Requests LAC     Gram stain       AEROBIC BOTTLE Gram positive cocci IN GROUPS                  SMEAR CALLED TO AND CORRECTLY REPEATED BY: MARY KAMINSKI 4N ON [id number] AT 1988 HRS TO 1396.            Culture       STAPHYLOCOCCUS SPECIES, COAGULASE NEGATIVE GROWING IN 1 OF 2 BOTTLES DRAWN SITE = LAC          Culture, Blood 2 [[id number]] Collected: 03/06/23 1530    Order Status: Completed Specimen: Blood Updated: 03/12/23 0657     Special Requests L ARM     Culture NO GROWTH 6 DAYS       Culture, Blood, PCR ID Panel [[id number]]  (Abnormal) Collected: 03/06/23 1530    Order Status: Completed Specimen: Blood Updated: 03/08/23 0200     Accession Number [id number]     Enterococcus faecalis by PCR Not detected        Enterococcus faecium by PCR Not detected        Listeria monocytogenes by PCR Not detected        STAPHYLOCOCCUS Detected        Staphylococcus Aureus Not detected Staphylococcus epidermidis by PCR Detected        Staphylococcus lugdunensis by PCR Not detected        STREPTOCOCCUS Not detected        Streptococcus agalactiae (Group B) Not detected        Strep pneumoniae Not detected        Strep pyogenes,(Grp. A) Not detected        Acinetobacter calcoac baumannii complex by PCR Not detected        Bacteroides fragilis by PCR Not detected        Enterobacteriaceae by PCR Not detected        Enterobacter cloacae complex by PCR Not detected        Escherichia Coli Not detected        Klebsiella aerogenes by PCR Not detected        Klebsiella oxytoca by PCR Not detected        Klebsiella pneumoniae group by PCR Not detected        Proteus by PCR Not detected        Salmonella species by PCR Not detected        Serratia marcescens by PCR Not detected        Haemophilus Influenzae by PCR Not detected        Neisseria meningitidis by PCR Not detected        Pseudomonas aeruginosa Not detected        Stenotrophomonas maltophilia by PCR Not detected        Candida albicans by PCR Not detected        Candida auris by PCR Not detected        Candida glabrata Not detected        Candida krusei by PCR Not detected        Candida parapsilosis by PCR Not detected        Candida tropicalis by PCR Not detected        Cryptococcus neoformans/gattii by PCR Not detected        Resistant gene targets            Methicillin Resistance mecA/C  by PCR Not detected        [employer] test comment       False positive results may rarely occur. Correlate with clinical,epidemiologic, and other laboratory findings           Comment: Please see BCID Interpretation Guide in EPIC Links                   RADIOLOGY:    All available imaging studies/reports in [facility] for this admission were reviewed    Total time spent >35 minutes.  High complexity decision making was performed during the evaluation of this patient at high risk for decompensation with multiple organ involvement     Above mentioned total time spent on reviewing the case/medical record/data/notes/EMR/patient examination/documentation/coordinating care with nurse/consultants, exclusive of procedures with complex decision making performed and > 50% time spent in face to face evaluation. Disclaimer: Sections of this note are dictated utilizing voice recognition software, which may have resulted in some phonetic based errors in grammar and contents. Even though attempts were made to correct all the mistakes, some may have been missed, and remained in the body of the document. If questions arise, please contact our department.     Dr. Ludivina Mcgee, Infectious Disease Specialist  [phone]  March 13, 2023  3:32 PM

## 2023-03-14 LAB
BACTERIA SPEC CULT: NORMAL
SERVICE CMNT-IMP: NORMAL

## (undated) DEVICE — TRAY SUPP STD NO DRUG W EXTENSION SET

## (undated) DEVICE — NDL SPNE QNCKE 22GX5IN LF BLK --

## (undated) DEVICE — (D)BNDG ADHESIVE FABRIC 3/4X3 -- DISC BY MFR USE ITEM 357960

## (undated) DEVICE — CUFF BLD PRESSURE MONITORING LNG AD 23-33 CM 1 TUBE MY CUF

## (undated) DEVICE — DRAPE TWL SURG 16X26IN BLU ORB04] ALLCARE INC]

## (undated) DEVICE — SET EXTN SM 0.5ML L13IN BOR W/O INJ SITE

## (undated) DEVICE — MIRAGE SWIFT II PILLOW LGE: Brand: MIRAGE SWIFT II